# Patient Record
Sex: MALE | Race: WHITE | HISPANIC OR LATINO | Employment: FULL TIME | URBAN - METROPOLITAN AREA
[De-identification: names, ages, dates, MRNs, and addresses within clinical notes are randomized per-mention and may not be internally consistent; named-entity substitution may affect disease eponyms.]

---

## 2017-08-14 ENCOUNTER — TRANSFERRED RECORDS (OUTPATIENT)
Dept: HEALTH INFORMATION MANAGEMENT | Facility: CLINIC | Age: 22
End: 2017-08-14

## 2017-08-14 ENCOUNTER — OFFICE VISIT (OUTPATIENT)
Dept: FAMILY MEDICINE | Age: 22
End: 2017-08-14

## 2017-08-14 VITALS
WEIGHT: 170.86 LBS | SYSTOLIC BLOOD PRESSURE: 122 MMHG | BODY MASS INDEX: 24.46 KG/M2 | RESPIRATION RATE: 14 BRPM | HEART RATE: 58 BPM | HEIGHT: 70 IN | DIASTOLIC BLOOD PRESSURE: 82 MMHG

## 2017-08-14 DIAGNOSIS — F98.8 ADD (ATTENTION DEFICIT DISORDER): Primary | ICD-10-CM

## 2017-08-14 DIAGNOSIS — J45.990 EXERCISE INDUCED BRONCHOSPASM: ICD-10-CM

## 2017-08-14 PROCEDURE — 99214 OFFICE O/P EST MOD 30 MIN: CPT | Performed by: PHYSICIAN ASSISTANT

## 2017-08-14 RX ORDER — METHYLPHENIDATE HYDROCHLORIDE 20 MG/1
TABLET ORAL
Qty: 30 TABLET | Refills: 0 | Status: SHIPPED | OUTPATIENT
Start: 2017-08-14 | End: 2017-09-15 | Stop reason: SDUPTHER

## 2017-08-14 RX ORDER — METHYLPHENIDATE HYDROCHLORIDE 50 MG/1
50 CAPSULE, EXTENDED RELEASE ORAL EVERY MORNING
Qty: 30 CAPSULE | Refills: 0 | Status: SHIPPED | OUTPATIENT
Start: 2017-08-14 | End: 2017-09-15 | Stop reason: SDUPTHER

## 2017-08-14 RX ORDER — ALBUTEROL SULFATE 90 UG/1
AEROSOL, METERED RESPIRATORY (INHALATION)
Qty: 1 INHALER | Refills: 5 | Status: SHIPPED | OUTPATIENT
Start: 2017-08-14

## 2017-09-15 ENCOUNTER — TELEPHONE (OUTPATIENT)
Dept: FAMILY MEDICINE | Age: 22
End: 2017-09-15

## 2017-09-15 DIAGNOSIS — F98.8 ADD (ATTENTION DEFICIT DISORDER): ICD-10-CM

## 2017-09-15 RX ORDER — METHYLPHENIDATE HYDROCHLORIDE 20 MG/1
TABLET ORAL
Qty: 30 TABLET | Refills: 0 | Status: SHIPPED | OUTPATIENT
Start: 2017-09-15

## 2017-09-15 RX ORDER — METHYLPHENIDATE HYDROCHLORIDE 50 MG/1
50 CAPSULE, EXTENDED RELEASE ORAL EVERY MORNING
Qty: 30 CAPSULE | Refills: 0 | Status: SHIPPED | OUTPATIENT
Start: 2017-09-15

## 2017-10-02 ENCOUNTER — TELEPHONE (OUTPATIENT)
Dept: FAMILY MEDICINE | Facility: CLINIC | Age: 22
End: 2017-10-02

## 2017-10-02 ENCOUNTER — OFFICE VISIT (OUTPATIENT)
Dept: FAMILY MEDICINE | Facility: CLINIC | Age: 22
End: 2017-10-02
Payer: COMMERCIAL

## 2017-10-02 VITALS
HEART RATE: 60 BPM | DIASTOLIC BLOOD PRESSURE: 82 MMHG | TEMPERATURE: 97.5 F | WEIGHT: 167.13 LBS | BODY MASS INDEX: 23.4 KG/M2 | HEIGHT: 71 IN | OXYGEN SATURATION: 99 % | SYSTOLIC BLOOD PRESSURE: 128 MMHG

## 2017-10-02 DIAGNOSIS — F90.0 ATTENTION DEFICIT HYPERACTIVITY DISORDER (ADHD), PREDOMINANTLY INATTENTIVE TYPE: Primary | ICD-10-CM

## 2017-10-02 DIAGNOSIS — J45.990 EXERCISE-INDUCED ASTHMA: ICD-10-CM

## 2017-10-02 PROCEDURE — 99203 OFFICE O/P NEW LOW 30 MIN: CPT | Performed by: FAMILY MEDICINE

## 2017-10-02 RX ORDER — METHYLPHENIDATE HYDROCHLORIDE 50 MG/1
50 CAPSULE, EXTENDED RELEASE ORAL
COMMUNITY
Start: 2015-03-15 | End: 2017-10-25

## 2017-10-02 RX ORDER — ALBUTEROL SULFATE 90 UG/1
2 AEROSOL, METERED RESPIRATORY (INHALATION)
COMMUNITY
Start: 2015-03-15 | End: 2017-10-25

## 2017-10-02 RX ORDER — METHYLPHENIDATE HYDROCHLORIDE 20 MG/1
20 TABLET ORAL AT BEDTIME
COMMUNITY
End: 2017-10-25

## 2017-10-02 NOTE — MR AVS SNAPSHOT
"              After Visit Summary   10/2/2017    Patrick Gutierrez    MRN: [id number]           Patient Information     Date Of Birth          1995        Visit Information        Provider Department      10/2/2017 7:20 AM Katt Meredith DO [facility]        Today's Diagnoses     Attention deficit hyperactivity disorder (ADHD), predominantly inattentive type    -  1    Exercise-induced asthma           Follow-ups after your visit        Who to contact     If you have questions or need follow up information about today's clinic visit or your schedule please contact [facility] directly at [phone].  Normal or non-critical lab and imaging results will be communicated to you by [employer], letter or phone within 4 business days after the clinic has received the results. If you do not hear from us within 7 days, please contact the clinic through [employer] or phone. If you have a critical or abnormal lab result, we will notify you by phone as soon as possible.  Submit refill requests through [employer] or call your pharmacy and they will forward the refill request to us. Please allow 3 business days for your refill to be completed.          Additional Information About Your Visit        MyChart Information     [employer] lets you send messages to your doctor, view your test results, renew your prescriptions, schedule appointments and more. To sign up, go to [email or web address] . Click on \"Log in\" on the left side of the screen, which will take you to the Welcome page. Then click on \"Sign up Now\" on the right side of the page.     You will be asked to enter the access code listed below, as well as some personal information. Please follow the directions to create your username and password.     Your access code is: [email or web address]  Expires: 2017  9:48 AM     Your access code will  in 90 days. If you need help or a new code, please call your [facility] or " "[phone].        Care EveryWhere ID     This is your Care EveryWhere ID. This could be used by other organizations to access your [facility] medical records  [id number]        Your Vitals Were     Pulse Temperature Height Pulse Oximetry BMI (Body Mass Index)       60 97.5  F (36.4  C) (Oral) 5' 11\" (1.803 m) 99% 23.31 kg/m2        Blood Pressure from Last 3 Encounters:   10/02/17 128/82    Weight from Last 3 Encounters:   10/02/17 167 lb 2 oz (75.8 kg)              Today, you had the following     No orders found for display       Primary Care Provider    None Specified       No primary provider on file.        Equal Access to Services     HOPE BOTELLO : Lowell Solitario, denny leach, rafael foster, rosa maria kaplan . So [facility] [phone].    ATENCIÓN: Si habla español, tiene a lopez disposición servicios gratuitos de asistencia lingüística. Llame al [phone].    We comply with applicable federal civil rights laws and [place] laws. We do not discriminate on the basis of race, color, national origin, age, disability, sex, sexual orientation, or gender identity.            Thank you!     Thank you for choosing [facility]  for your care. Our goal is always to provide you with excellent care. Hearing back from our patients is one way we can continue to improve our services. Please take a few minutes to complete the written survey that you may receive in the mail after your visit with us. Thank you!             Your Updated Medication List - Protect others around you: Learn how to safely use, store and throw away your medicines at www.disposemymeds.org.          This list is accurate as of: 10/2/17  9:48 AM.  Always use your most recent med list.                   Brand Name Dispense Instructions for use Diagnosis    albuterol 108 (90 BASE) MCG/ACT Inhaler    PROAIR HFA/PROVENTIL HFA/VENTOLIN HFA     Inhale 2 puffs into the lungs        * " methylphenidate 20 MG tablet    RITALIN     Take 20 mg by mouth At Bedtime        * methylphenidate 50 MG CR capsule    METADATE CD     Take 50 mg by mouth        * Notice:  This list has 2 medication(s) that are the same as other medications prescribed for you. Read the directions carefully, and ask your doctor or other care provider to review them with you.

## 2017-10-02 NOTE — TELEPHONE ENCOUNTER
Called patient and left a VM message for patient to come back to clinic and sign the Release of Information.    Placed Release of Information at the  and logged into book (in envelope).    Please have patient sign the Release of Information.        Patient was seen today and he took with him a Release of Information for his prior PCP.    Patient accidentally took the Release of Info with him but brought it back to the clinic.    The Release of Information does not have patients signature.

## 2017-10-02 NOTE — NURSING NOTE
"Chief Complaint   Patient presents with     Recheck Medication       Initial /84 (BP Location: Left arm, Patient Position: Sitting, Cuff Size: Adult Regular)  Pulse 60  Temp 97.5  F (36.4  C) (Oral)  Ht 5' 11\" (1.803 m)  Wt 167 lb 2 oz (75.8 kg)  SpO2 99%  BMI 23.31 kg/m2 Estimated body mass index is 23.31 kg/(m^2) as calculated from the following:    Height as of this encounter: 5' 11\" (1.803 m).    Weight as of this encounter: 167 lb 2 oz (75.8 kg).  Medication Reconciliation: complete     Jesenia MORENO, Certified Medical Assistant (AAMA)October 2, 2017 7:26 AM      "

## 2017-10-02 NOTE — PROGRESS NOTES
"  SUBJECTIVE:   Patrick Gutierrez is a 22 year old male who presents to clinic today for the following health issues:      Asthma Follow-Up    Was ACT completed today?  Yes  No flowsheet data found.    Recent asthma triggers that patient is dealing with: exercise or sports      He just uses -track, and had done well on this      Establishing care for ADD.  He was going to [facility] in [place].        Amount of exercise or physical activity: 6-7 days/week for an average of greater than 60 minutes    Problems taking medications regularly: No    Medication side effects: none  Diet: regular (no restrictions)    Never see a psychologist, diagnosed in high school  adderall in the past  metadate cd and has short acting ritalin 20mg that he takes at night, it does not affect his sleep      Problem list and histories reviewed & adjusted, as indicated.  Additional history: as documented    Patient Active Problem List   Diagnosis     Exercise-induced asthma     History reviewed. No pertinent surgical history.    Social History   Substance Use Topics     Smoking status: Never Smoker     Smokeless tobacco: Never Used     Alcohol use No     Family History   Problem Relation Age of Onset     Unknown/Adopted Mother      Unknown/Adopted Father      Unknown/Adopted Maternal Grandmother      Unknown/Adopted Maternal Grandfather      Unknown/Adopted Paternal Grandmother      Unknown/Adopted Paternal Grandfather              Reviewed and updated as needed this visit by clinical staff       Reviewed and updated as needed this visit by Provider         ROS:  Constitutional, HEENT, cardiovascular, pulmonary, GI, , musculoskeletal, neuro, skin, endocrine and psych systems are negative, except as otherwise noted.      OBJECTIVE:   /82 (BP Location: Left arm, Patient Position: Sitting, Cuff Size: Adult Regular)  Pulse 60  Temp 97.5  F (36.4  C) (Oral)  Ht 5' 11\" (1.803 m)  Wt 167 lb 2 oz (75.8 kg)  SpO2 99%  BMI 23.31 " kg/m2  Body mass index is 23.31 kg/(m^2).  GENERAL: healthy, alert and no distress  NECK: no adenopathy, no asymmetry, masses, or scars and thyroid normal to palpation  RESP: lungs clear to auscultation - no rales, rhonchi or wheezes  CV: regular rate and rhythm, normal S1 S2, no S3 or S4, no murmur, click or rub, no peripheral edema and peripheral pulses strong  ABDOMEN: soft, nontender, no hepatosplenomegaly, no masses and bowel sounds normal  MS: no gross musculoskeletal defects noted, no edema    Diagnostic Test Results:  none     ASSESSMENT/PLAN:       ICD-10-CM    1. Attention deficit hyperactivity disorder (ADHD), predominantly inattentive type F90.0    2. Exercise-induced asthma J45.990      Per patient exercise induced asthma-stable on proair  ADD-he is stable on current meds, he has refills for one month, he will call to get records, once reviewed can give him 3 months supply then needs to follow up with provider here, he reports of getting his refills last week      See Patient Instructions    Katt Meredith DO  [facility]

## 2017-10-03 ASSESSMENT — ASTHMA QUESTIONNAIRES: ACT_TOTALSCORE: 25

## 2017-10-03 NOTE — TELEPHONE ENCOUNTER
Faxed Release of Information to [facility] ([facility]) at [phone].    Dana Victor

## 2017-10-25 DIAGNOSIS — J45.990 EXERCISE-INDUCED ASTHMA: ICD-10-CM

## 2017-10-25 DIAGNOSIS — F90.0 ATTENTION DEFICIT HYPERACTIVITY DISORDER (ADHD), PREDOMINANTLY INATTENTIVE TYPE: Primary | ICD-10-CM

## 2017-10-25 NOTE — TELEPHONE ENCOUNTER
We have not filled for this patient so will need new RX's    Thanks

## 2017-10-27 RX ORDER — METHYLPHENIDATE HYDROCHLORIDE 20 MG/1
20 TABLET ORAL AT BEDTIME
Qty: 30 TABLET | Refills: 0 | Status: SHIPPED | OUTPATIENT
Start: 2017-10-27 | End: 2017-11-27

## 2017-10-27 RX ORDER — ALBUTEROL SULFATE 90 UG/1
2 AEROSOL, METERED RESPIRATORY (INHALATION) EVERY 6 HOURS PRN
Qty: 1 INHALER | Refills: 0 | Status: SHIPPED | OUTPATIENT
Start: 2017-10-27 | End: 2017-11-22

## 2017-10-27 RX ORDER — METHYLPHENIDATE HYDROCHLORIDE 50 MG/1
50 CAPSULE, EXTENDED RELEASE ORAL EVERY MORNING
Qty: 30 CAPSULE | Refills: 0 | Status: SHIPPED | OUTPATIENT
Start: 2017-10-27 | End: 2017-11-27

## 2017-10-27 NOTE — TELEPHONE ENCOUNTER
No records here, will only give one month for now, he will need to call his clinic and get records in and come in for a visit for further refills  Thanks  Katt Meredith D.O.

## 2017-10-30 NOTE — TELEPHONE ENCOUNTER
Patient came to  script from , patient stated he fills his scripts at [facility] and was unsure why his was walked over to the [facility], patient will be back at 830 to  script from pharmacy.    .Joana Nettles  Patient Representative

## 2017-11-22 DIAGNOSIS — J45.990 EXERCISE-INDUCED ASTHMA: ICD-10-CM

## 2017-11-24 RX ORDER — ALBUTEROL SULFATE 90 UG/1
AEROSOL, METERED RESPIRATORY (INHALATION)
Qty: 18 G | Refills: 0 | Status: SHIPPED | OUTPATIENT
Start: 2017-11-24 | End: 2018-01-02

## 2017-11-24 NOTE — TELEPHONE ENCOUNTER
Requested Prescriptions   Pending Prescriptions Disp Refills     VENTOLIN  (90 BASE) MCG/ACT Inhaler [Pharmacy Med Name: VENTOLIN HFA 108MCG/ACT AERS] 18 g 0     Sig: INHALE 2 PUFFS INTO THE LUNGS EVERY 6 HOURS AS NEEDED FOR SHORTNESS OF BREATH/DYSPNEA OR WHEEZING    Asthma Maintenance Inhalers - Anticholinergics Passed    11/22/2017 12:47 PM       Passed - Patient is age 12 years or older       Passed - Asthma control test score is 20 or greater in last 6 months    Please review ACT score.          Passed - Recent (6 mo) or future visit with authorizing provider's specialty    Patient had office visit in the last 6 months or has a visit in the next 30 days with authorizing provider.  See chart review.

## 2017-11-24 NOTE — TELEPHONE ENCOUNTER
Prescription approved per [facility] Refill Protocol.    Marcelina Prieto RN  [facility]

## 2017-11-27 DIAGNOSIS — F90.0 ATTENTION DEFICIT HYPERACTIVITY DISORDER (ADHD), PREDOMINANTLY INATTENTIVE TYPE: ICD-10-CM

## 2017-12-04 NOTE — TELEPHONE ENCOUNTER
Reason for Call:  Medication or medication refill:  refill    Do you use a [facility]?  Yes  Name of the pharmacy and phone number for the current request: [facility]    Name of the medication requested:  ADHD medications    Other request: Patient is calling to check the status of the medication.  He states he is out of the medication.  Patient informed that Dr Meredith is out of clinic until 12/11/17.  Patient was upset that he was not told this before and that a different provider would not fill it.     Can we leave a detailed message on this number? YES    Phone number patient can be reached at: Home number on file [phone] (home)    Best Time: Any     Call taken on 12/4/2017 at 8:51 AM by Giselle Brandon

## 2017-12-05 NOTE — TELEPHONE ENCOUNTER
Based on Dr. Meredith's last note, she wanted to review his previous records, have we gotten those?  Please get   Perhaps we can give short term refill until records are obtained and Dr. Meredith has returned, team to huddle with a provider once above is done.  Essie Multani MD

## 2017-12-06 RX ORDER — METHYLPHENIDATE HYDROCHLORIDE 50 MG/1
50 CAPSULE, EXTENDED RELEASE ORAL EVERY MORNING
Qty: 30 CAPSULE | Refills: 0 | Status: SHIPPED | OUTPATIENT
Start: 2017-12-06 | End: 2018-01-03

## 2017-12-06 RX ORDER — METHYLPHENIDATE HYDROCHLORIDE 20 MG/1
20 TABLET ORAL AT BEDTIME
Qty: 30 TABLET | Refills: 0 | Status: SHIPPED | OUTPATIENT
Start: 2017-12-06 | End: 2018-01-03

## 2017-12-06 NOTE — TELEPHONE ENCOUNTER
Anyone can refill it, I won't be at the office for another two hours due to meetings.    Ralf Doe MD

## 2017-12-06 NOTE — TELEPHONE ENCOUNTER
Encounter dated 12/06/17 at 8:17 deleted because it contains information regarding other patients. This was done in error.   Jagdish Carlos RN

## 2017-12-06 NOTE — TELEPHONE ENCOUNTER
Refills printed.  Please call patient and then advise follow up with Dr. Meredith for the next refills.    Ralf Doe MD

## 2017-12-06 NOTE — TELEPHONE ENCOUNTER
Dr. Doe,   Patient is requesting a refill of his ADHD medications. He is a patient of Dr. Meredith's and she is out of the office until next week.  Below is the  report.  Would you be willing to write a refill?  Patient uses our Pharmacy next door.    Thank you,  Jagdish Carlos RN        [place] Prescription Monitoring Program        Query Request Status  Query Number  Job Sequence ID Request Date Query Status/  Job Status Report Description or Denial Reason Output   [id number]   [id number]  17 Approved /   Queued  Recipient Report  Dispensed From 2016 to 2017  1 out of 2 Recipients Selected  Patrick Gutierrez - : 1995 - [address]   PDF   [id number]  [id number] 17 Approved /   Done  Recipient Report  Dispensed From 2016 to 2017  1 out of 1 Recipients Selected  TOI FRANCOIS - : 1973 -  [address]   PDF   [id number]  [id number] 17 Approved /   Done  Recipient Report  Dispensed From 2016 to 2017  1 out of 1 Recipients Selected  TOI FRANCOIS - : [address]   PDF   Search History Status  Job Sequence ID Date Requested Status Report Output        Copyright   2017 [employer]  If you need further assistance, please contact the [facility] Help Desk [place] Prescription Monitoring Program   [address], [address]   [place] [address]  HelpDesk:[phone]/Phone:[phone]  Email: [email or web address].

## 2018-01-02 DIAGNOSIS — J45.990 EXERCISE-INDUCED ASTHMA: ICD-10-CM

## 2018-01-03 RX ORDER — ALBUTEROL SULFATE 90 UG/1
AEROSOL, METERED RESPIRATORY (INHALATION)
Qty: 18 G | Refills: 1 | Status: SHIPPED | OUTPATIENT
Start: 2018-01-03 | End: 2018-05-22

## 2018-01-03 NOTE — TELEPHONE ENCOUNTER
Prescription approved per [facility] Refill Protocol.  Cassandra Seth,Clinic Rn  [place]

## 2018-01-03 NOTE — TELEPHONE ENCOUNTER
Requested Prescriptions   Pending Prescriptions Disp Refills     VENTOLIN  (90 BASE) MCG/ACT Inhaler [Pharmacy Med Name: VENTOLIN HFA 108MCG/ACT AERS]  Last Written Prescription Date:  11/24/2017  Last Fill Quantity: 18 g,  # refills: 0   Last Office Visit with [facility] or [facility] prescribing provider:  10/2/2017   Future Office Visit:      18 g 0     Sig: INHALE 2 PUFFS INTO THE LUNGS EVERY 6 HOURS AS NEEDED FOR SHORTNESS OF BREATH/DYSPNEA OR WHEEZING    Asthma Maintenance Inhalers - Anticholinergics Passed    1/2/2018  1:17 PM       Passed - Patient is age 12 years or older       Passed - Asthma control test score is 20 or greater in last 6 months    Please review ACT score.          Passed - Recent (6 mo) or future visit with authorizing provider's specialty    Patient had office visit in the last 6 months or has a visit in the next 30 days with authorizing provider.  See chart review.

## 2018-01-05 ENCOUNTER — TELEPHONE (OUTPATIENT)
Dept: FAMILY MEDICINE | Facility: CLINIC | Age: 23
End: 2018-01-05

## 2018-01-05 NOTE — TELEPHONE ENCOUNTER
Please make sure patient has his records from his old clinic , as I can not keep refilling adderall without his records  I have discussed this with him when he established care 10/17  Other wise he should make an appoint with psychiatry/psychologist for diagnosis.  Katt Meredith D.O.

## 2018-01-08 NOTE — TELEPHONE ENCOUNTER
Patient called and would like to know can medication be refilled before appointment because he is out, please call to discuss [phone]

## 2018-01-09 NOTE — TELEPHONE ENCOUNTER
Informed patient that we have not received his records yet & encouraged him to call his previous provider to fax them. We need those records before we can prescribe. Patient verbalized understanding.    Sindhu Montano RN

## 2018-01-11 ENCOUNTER — DOCUMENTATION ONLY (OUTPATIENT)
Dept: FAMILY MEDICINE | Facility: CLINIC | Age: 23
End: 2018-01-11

## 2018-01-12 NOTE — PROGRESS NOTES
Records received from [facility].  Routed to Katt Meredith DO to review and return to be scanned into chart.      .Joana Nettles  Patient Representative

## 2018-01-12 NOTE — TELEPHONE ENCOUNTER
We received patients prior medical records and Dr Meredith reviewed.    Dr Meredith wrote one month supply of ADHD medication.    Called patient to let him know and he asked that I bring the scripts next door to our pharmacy.    Walked scripts next door to our pharmacy.    Dana Victor

## 2018-01-22 ENCOUNTER — OFFICE VISIT (OUTPATIENT)
Dept: FAMILY MEDICINE | Facility: CLINIC | Age: 23
End: 2018-01-22
Payer: COMMERCIAL

## 2018-01-22 VITALS
WEIGHT: 161 LBS | HEIGHT: 71 IN | HEART RATE: 70 BPM | SYSTOLIC BLOOD PRESSURE: 122 MMHG | DIASTOLIC BLOOD PRESSURE: 72 MMHG | TEMPERATURE: 98.2 F | BODY MASS INDEX: 22.54 KG/M2

## 2018-01-22 DIAGNOSIS — F90.0 ATTENTION DEFICIT HYPERACTIVITY DISORDER (ADHD), PREDOMINANTLY INATTENTIVE TYPE: ICD-10-CM

## 2018-01-22 DIAGNOSIS — J45.990 EXERCISE-INDUCED ASTHMA: Primary | ICD-10-CM

## 2018-01-22 PROCEDURE — 99214 OFFICE O/P EST MOD 30 MIN: CPT | Performed by: FAMILY MEDICINE

## 2018-01-22 RX ORDER — METHYLPHENIDATE HYDROCHLORIDE 20 MG/1
TABLET ORAL
Qty: 30 TABLET | Refills: 0 | Status: SHIPPED | OUTPATIENT
Start: 2018-04-12 | End: 2018-08-15

## 2018-01-22 RX ORDER — METHYLPHENIDATE HYDROCHLORIDE 50 MG/1
50 CAPSULE, EXTENDED RELEASE ORAL DAILY
Qty: 30 CAPSULE | Refills: 0 | Status: SHIPPED | OUTPATIENT
Start: 2018-02-12 | End: 2018-03-14

## 2018-01-22 RX ORDER — METHYLPHENIDATE HYDROCHLORIDE 20 MG/1
TABLET ORAL
Qty: 30 TABLET | Refills: 0 | Status: SHIPPED | OUTPATIENT
Start: 2018-02-12 | End: 2018-05-15

## 2018-01-22 RX ORDER — METHYLPHENIDATE HYDROCHLORIDE 50 MG/1
50 CAPSULE, EXTENDED RELEASE ORAL EVERY MORNING
Qty: 30 CAPSULE | Refills: 0 | Status: CANCELLED | OUTPATIENT
Start: 2018-01-22

## 2018-01-22 RX ORDER — METHYLPHENIDATE HYDROCHLORIDE 50 MG/1
50 CAPSULE, EXTENDED RELEASE ORAL DAILY
Qty: 30 CAPSULE | Refills: 0 | Status: SHIPPED | OUTPATIENT
Start: 2018-03-12 | End: 2018-04-11

## 2018-01-22 RX ORDER — METHYLPHENIDATE HYDROCHLORIDE 50 MG/1
50 CAPSULE, EXTENDED RELEASE ORAL DAILY
Qty: 30 CAPSULE | Refills: 0 | Status: SHIPPED | OUTPATIENT
Start: 2018-04-12 | End: 2018-05-12

## 2018-01-22 RX ORDER — METHYLPHENIDATE HYDROCHLORIDE 20 MG/1
TABLET ORAL
Qty: 30 TABLET | Refills: 0 | Status: SHIPPED | OUTPATIENT
Start: 2018-01-22 | End: 2018-05-22

## 2018-01-22 RX ORDER — FLUTICASONE PROPIONATE 220 UG/1
2 AEROSOL, METERED RESPIRATORY (INHALATION) 2 TIMES DAILY
Qty: 3 INHALER | Refills: 0 | Status: SHIPPED | OUTPATIENT
Start: 2018-01-22 | End: 2018-05-22

## 2018-01-22 RX ORDER — METHYLPHENIDATE HYDROCHLORIDE 20 MG/1
TABLET ORAL
Qty: 30 TABLET | Refills: 0 | Status: SHIPPED | OUTPATIENT
Start: 2018-03-12 | End: 2018-08-15

## 2018-01-22 NOTE — MR AVS SNAPSHOT
After Visit Summary   1/22/2018    Patrick Gutierrez    MRN: [id number]           Patient Information     Date Of Birth          1995        Visit Information        Provider Department      1/22/2018 10:00 AM Katt Meredith DO [facility]        Today's Diagnoses     Exercise-induced asthma    -  1    Attention deficit hyperactivity disorder (ADHD), predominantly inattentive type          Care Instructions    Use flovent as advised and albuterol  If persisting symptoms then follow up sooner  Otherwise we will see you in 6 months as planned  Sign contracted as discussed today  Katt Meredith D.O.    [facility]   Discharged by : Rosa Maria Butt MA    Paper scripts provided to patient : yes     If you have any questions regarding your visit please contact your care team:     Team Gold                Clinic Hours Telephone Number     Dr. Jesenia Del Castillo 7am-7pm  Monday - Thursday   7am-5pm  Fridays  [phone]   (Appointment scheduling available 24/7)     RN Line  [phone] option 2     Urgent Care - [place] and [facility] - 11am-9pm Monday-Friday Saturday-Sunday- 9am-5pm     [place] -   5pm-9pm Monday-Friday Saturday-Sunday- 9am-5pm    [phone] - [place]    [phone] - [place]       For a Price Quote for your services, please call our Consumer Price Line at [phone].     What options do I have for visits at the clinic other than the traditional office visit?     To expand how we care for you, many of our providers are utilizing electronic visits (e-visits) and telephone visits, when medically appropriate, for interactions with their patients rather than a visit in the clinic. We also offer nurse visits for many medical concerns. Just like any other service, we will bill your insurance company for this type of visit based on  time spent on the phone with your provider. Not all insurance companies cover these visits. Please check with your medical insurance if this type of visit is covered. You will be responsible for any charges that are not paid by your insurance.   E-visits via [employer]: generally incur a $35.00 fee.     Telephone visits:   Time spent on the phone: *charged based on time that is spent on the phone in increments of 10 minutes. Estimated cost:   5-10 mins $30.00   11-20 mins. $59.00   21-30 mins. $85.00     Use [employer] (secure email communication and access to your chart) to send your primary care provider a message or make an appointment. Ask someone on your Team how to sign up for [employer].     As always, Thank you for trusting us with your health care needs!      [facility]:    Scheduling Appointments  [place]  Call: [phone]    [facility]  Call: [phone]    [facility]  Call: [phone]    For Gastroenterology referrals   [facility]   Clinics and Surgery Center, 4th Floor   [address] [place] [id number]   Appointments: [phone]    WHERE TO GO FOR CARE?  Clinic    Make an appointment if you:       Are sick (cold, cough, flu, sore throat, earache or in pain).       Have a small injury (sprain, small cut, burn or broken bone).       Need a physical exam, Pap smear, vaccine or prescription refill.       Have questions about your health or medicines.    To reach us:      Call [phone] ([phone]). Open 24 hours every day. (For counseling services, call [phone].)    Log into [employer] at [email or web address]. (Visit [email or web address] to create an account.) Hospital emergency room    An emergency is a serious or life- threatening problem that must be treated right away.    Call 233 or get to the hospital if you have:      Very bad or sudden:            - Chest pain or  pressure         - Bleeding         - Head or belly pain         - Dizziness or trouble seeing, walking or                          Speaking      Problems breathing      Blood in your vomit or you are coughing up blood      A major injury (knocked out, loss of a finger or limb, rape, broken bone protruding from skin)    A mental health crisis. (Or call the Mental Health Crisis line at 1-539.212.8128 or Suicide Prevention Hotline at 1-473.661.8676.)    Open 24 hours every day. You don't need an appointment.     Urgent care    Visit urgent care for sickness or small injuries when the clinic is closed. You don't need an appointment. To check hours or find an urgent care near you, visit [email or web address]. Online care    Get online care from [facility] for more than 70 common problems, like colds, allergies and infections. Open 24 hours every day at:   [email or web address]   Need help deciding?    For advice about where to be seen, you may call your clinic and ask to speak with a nurse. We're here for you 24 hours every day.         If you are deaf or hard of hearing, please let us know. We provide many free services including sign language interpreters, oral interpreters, TTYs, telephone amplifiers, note takers and written materials.                   Follow-ups after your visit        Who to contact     If you have questions or need follow up information about today's clinic visit or your schedule please contact [facility] directly at [phone].  Normal or non-critical lab and imaging results will be communicated to you by MyChart, letter or phone within 4 business days after the clinic has received the results. If you do not hear from us within 7 days, please contact the clinic through [employer] or phone. If you have a critical or abnormal lab result, we will notify you by phone as soon as possible.  Submit refill requests through [employer] or call your pharmacy and they will forward the refill request to us.  "Please allow 3 business days for your refill to be completed.          Additional Information About Your Visit        MyChart Information     Allmyappshart lets you send messages to your doctor, view your test results, renew your prescriptions, schedule appointments and more. To sign up, go to [email or web address] . Click on \"Log in\" on the left side of the screen, which will take you to the Welcome page. Then click on \"Sign up Now\" on the right side of the page.     You will be asked to enter the access code listed below, as well as some personal information. Please follow the directions to create your username and password.     Your access code is: [email or web address]  Expires: 2018 10:27 AM     Your access code will  in 90 days. If you need help or a new code, please call your [facility] clinic or [phone].        Care EveryWhere ID     This is your Care EveryWhere ID. This could be used by other organizations to access your [facility] medical records  [id number]        Your Vitals Were     Pulse Temperature Height BMI (Body Mass Index)          70 98.2  F (36.8  C) (Oral) 5' 11\" (1.803 m) 22.45 kg/m2         Blood Pressure from Last 3 Encounters:   18 122/72   10/02/17 128/82    Weight from Last 3 Encounters:   18 161 lb (73 kg)   10/02/17 167 lb 2 oz (75.8 kg)              Today, you had the following     No orders found for display         Today's Medication Changes          These changes are accurate as of: 18 10:27 AM.  If you have any questions, ask your nurse or doctor.               Start taking these medicines.        Dose/Directions    fluticasone 220 MCG/ACT Inhaler   Commonly known as:  FLOVENT HFA   Used for:  Exercise-induced asthma   Started by:  Katt Meredith DO        Dose:  2 puff   Inhale 2 puffs into the lungs 2 times daily   Quantity:  3 Inhaler   Refills:  0         These medicines have changed or have updated prescriptions.        Dose/Directions    * " methylphenidate 50 MG CR capsule   Commonly known as:  METADATE CD   This may have changed:    - Another medication with the same name was added. Make sure you understand how and when to take each.  - Another medication with the same name was changed. Make sure you understand how and when to take each.   Used for:  Attention deficit hyperactivity disorder (ADHD), predominantly inattentive type   Changed by:  Ralf Doe MD        Dose:  50 mg   Take 1 capsule (50 mg) by mouth every morning   Quantity:  30 capsule   Refills:  0       * methylphenidate 20 MG tablet   Commonly known as:  RITALIN   This may have changed:  You were already taking a medication with the same name, and this prescription was added. Make sure you understand how and when to take each.   Used for:  Attention deficit hyperactivity disorder (ADHD), predominantly inattentive type   Changed by:  Katt Meredith DO        One tab daily   Quantity:  30 tablet   Refills:  0       * methylphenidate 20 MG tablet   Commonly known as:  RITALIN   This may have changed:    - how much to take  - how to take this  - when to take this  - additional instructions   Used for:  Attention deficit hyperactivity disorder (ADHD), predominantly inattentive type   Changed by:  Katt Meredith DO        Start taking on:  2/12/2018   Use one tab daily   Quantity:  30 tablet   Refills:  0       * methylphenidate 50 MG CR capsule   Commonly known as:  METADATE CD   This may have changed:  You were already taking a medication with the same name, and this prescription was added. Make sure you understand how and when to take each.   Used for:  Attention deficit hyperactivity disorder (ADHD), predominantly inattentive type   Changed by:  Katt Meredith DO        Dose:  50 mg   Start taking on:  2/12/2018   Take 1 capsule (50 mg) by mouth daily   Quantity:  30 capsule   Refills:  0       * methylphenidate 50 MG CR capsule   Commonly  known as:  METADATE CD   This may have changed:  You were already taking a medication with the same name, and this prescription was added. Make sure you understand how and when to take each.   Used for:  Attention deficit hyperactivity disorder (ADHD), predominantly inattentive type   Changed by:  Katt Meredith DO        Dose:  50 mg   Start taking on:  3/12/2018   Take 1 capsule (50 mg) by mouth daily   Quantity:  30 capsule   Refills:  0       * methylphenidate 20 MG tablet   Commonly known as:  RITALIN   This may have changed:  You were already taking a medication with the same name, and this prescription was added. Make sure you understand how and when to take each.   Used for:  Attention deficit hyperactivity disorder (ADHD), predominantly inattentive type   Changed by:  Katt Meredith DO        Start taking on:  3/12/2018   Use one tab daily   Quantity:  30 tablet   Refills:  0       * methylphenidate 50 MG CR capsule   Commonly known as:  METADATE CD   This may have changed:  You were already taking a medication with the same name, and this prescription was added. Make sure you understand how and when to take each.   Used for:  Attention deficit hyperactivity disorder (ADHD), predominantly inattentive type   Changed by:  Katt Meredith DO        Dose:  50 mg   Start taking on:  4/12/2018   Take 1 capsule (50 mg) by mouth daily   Quantity:  30 capsule   Refills:  0       * methylphenidate 20 MG tablet   Commonly known as:  RITALIN   This may have changed:  You were already taking a medication with the same name, and this prescription was added. Make sure you understand how and when to take each.   Used for:  Attention deficit hyperactivity disorder (ADHD), predominantly inattentive type   Changed by:  Katt Meredith DO        Start taking on:  4/12/2018   One tab daily   Quantity:  30 tablet   Refills:  0       * Notice:  This list has 8 medication(s) that are the same  as other medications prescribed for you. Read the directions carefully, and ask your doctor or other care provider to review them with you.         Where to get your medicines      These medications were [place] [facility] [place] - [address] [place] [address].  [address] [place] [address]., [facility] [address]     Phone:  [phone]     fluticasone 220 MCG/ACT Inhaler         Some of these will need a paper prescription and others can be bought over the counter.  Ask your nurse if you have questions.     Bring a paper prescription for each of these medications     methylphenidate 20 MG tablet    methylphenidate 20 MG tablet    methylphenidate 20 MG tablet    methylphenidate 20 MG tablet    methylphenidate 50 MG CR capsule    methylphenidate 50 MG CR capsule    methylphenidate 50 MG CR capsule                Primary Care Provider Office Phone # Fax #    Katt Meredith [phone]       [address] [place] [address]        Equal Access to Services     HOPE BOTELLO : Hadii nilton raymundoo Sowayne, waaxda luqadaha, qaybta kaalmada adeegyada, rosa maria kaplan . So [facility] [phone].    ATENCIÓN: Si habla español, tiene a lopez disposición servicios gratuitos de asistencia lingüística. Llame al [phone].    We comply with applicable federal civil rights laws and [place] laws. We do not discriminate on the basis of race, color, national origin, age, disability, sex, sexual orientation, or gender identity.            Thank you!     Thank you for choosing [facility]  for your care. Our goal is always to provide you with excellent care. Hearing back from our patients is one way we can continue to improve our services. Please take a few minutes to complete the written survey that you may receive in the mail after your visit with us. Thank you!             Your Updated Medication List - Protect others around you: Learn how  to safely use, store and throw away your medicines at www.disposemymeds.org.          This list is accurate as of: 1/22/18 10:27 AM.  Always use your most recent med list.                   Brand Name Dispense Instructions for use Diagnosis    fluticasone 220 MCG/ACT Inhaler    FLOVENT HFA    3 Inhaler    Inhale 2 puffs into the lungs 2 times daily    Exercise-induced asthma       * methylphenidate 50 MG CR capsule    METADATE CD    30 capsule    Take 1 capsule (50 mg) by mouth every morning    Attention deficit hyperactivity disorder (ADHD), predominantly inattentive type       * methylphenidate 20 MG tablet    RITALIN    30 tablet    One tab daily    Attention deficit hyperactivity disorder (ADHD), predominantly inattentive type       * methylphenidate 20 MG tablet   Start taking on:  2/12/2018    RITALIN    30 tablet    Use one tab daily    Attention deficit hyperactivity disorder (ADHD), predominantly inattentive type       * methylphenidate 50 MG CR capsule   Start taking on:  2/12/2018    METADATE CD    30 capsule    Take 1 capsule (50 mg) by mouth daily    Attention deficit hyperactivity disorder (ADHD), predominantly inattentive type       * methylphenidate 50 MG CR capsule   Start taking on:  3/12/2018    METADATE CD    30 capsule    Take 1 capsule (50 mg) by mouth daily    Attention deficit hyperactivity disorder (ADHD), predominantly inattentive type       * methylphenidate 20 MG tablet   Start taking on:  3/12/2018    RITALIN    30 tablet    Use one tab daily    Attention deficit hyperactivity disorder (ADHD), predominantly inattentive type       * methylphenidate 50 MG CR capsule   Start taking on:  4/12/2018    METADATE CD    30 capsule    Take 1 capsule (50 mg) by mouth daily    Attention deficit hyperactivity disorder (ADHD), predominantly inattentive type       * methylphenidate 20 MG tablet   Start taking on:  4/12/2018    RITALIN    30 tablet    One tab daily    Attention deficit hyperactivity  disorder (ADHD), predominantly inattentive type       VENTOLIN  (90 BASE) MCG/ACT Inhaler   Generic drug:  albuterol     18 g    INHALE 2 PUFFS INTO THE LUNGS EVERY 6 HOURS AS NEEDED FOR SHORTNESS OF BREATH/DYSPNEA OR WHEEZING    Exercise-induced asthma       * Notice:  This list has 8 medication(s) that are the same as other medications prescribed for you. Read the directions carefully, and ask your doctor or other care provider to review them with you.

## 2018-01-22 NOTE — LETTER
Controlled Medication Agreement for Stimulant Medication    [facility]  -- Controlled Medication Agreement    1/22/2018   Patrick Gutierrez   1995   [id number]       I understand that my provider is prescribing controlled medications to assist me in managing my ADHD.  The risks, benefits, and side effects of these medications have been explained to me and I agree to the following conditions for this type of treatment.    Stimulant Medication Prescribed: Ritalin 20mg daily, Ritalin 50mg CD daily    1.  I will take my medications exactly as prescribed and will not change the medication dosage or schedule without my provider's approval.  Refills will not be given if I  runs out early.     2.  I will keep all regular appointments at this clinic.  If there are three or more missed appointments or appointments canceled less than 2 hours before the scheduled time, my medication may be discontinued.    3.  I understand that prescriptions may only be written for one month at a time, and a written prescription is required each month.  Prescriptions cannot be called in or faxed to the pharmacy.    4.  If the prescription is lost or stolen, replacement is at the discretion of my provider.  I understand that this may mean the prescription might not be replaced.    5.  If I am late for scheduled follow up, I understand that I must make an appointment and that another refill is at the discretion of my provider.  This may mean a prescription for only the amount required until the appointment, regardless of prescription co-pay.  For example, if an appointment is made in 1 week, a prescription might only be written for 7 pills.      I understand that if I violate any of the above conditions, my prescription medications and/or treatment may be terminated.  If the violation includes providing controlled substances to anyone other than to whom the medication is prescribed, a report may be made to my child's physician, pharmacy,  and other authorities, including the police.    I have read this contract and it has been explained to me.  I fully understand the consequences of violating this agreement.    _________________________________/______________/____________________________    Patient signature/Date/Witness

## 2018-01-22 NOTE — PROGRESS NOTES
"  SUBJECTIVE:   Patrick Gutierrez is a 22 year old male who presents to clinic today for the following health issues:    6 month follow-up, reports he does not need refills today    Medication Followup of metedate and ritalin    Taking Medication as prescribed: yes    Side Effects:  None    Medication Helping Symptoms:  yes     Doing well with current meds,  No side effects  No appitite changes, no mood changes, focusing , no chest pain, no sob, no dizziness, no weight changes    Track, running and jumping a lot more   Usually uses albuterol as needed and prior to exercise and he has recently been using albuterol a little but more    He has been on advair in the past but did not use it daily  No smoking history          Problem list and histories reviewed & adjusted, as indicated.  Additional history: as documented    Patient Active Problem List   Diagnosis     Exercise-induced asthma     Attention deficit hyperactivity disorder (ADHD), predominantly inattentive type     History reviewed. No pertinent surgical history.    Social History   Substance Use Topics     Smoking status: Never Smoker     Smokeless tobacco: Never Used     Alcohol use No     Family History   Problem Relation Age of Onset     Unknown/Adopted Mother      Unknown/Adopted Father      Unknown/Adopted Maternal Grandmother      Unknown/Adopted Maternal Grandfather      Unknown/Adopted Paternal Grandmother      Unknown/Adopted Paternal Grandfather              Reviewed and updated as needed this visit by clinical staff     Reviewed and updated as needed this visit by Provider         ROS:  Constitutional, HEENT, cardiovascular, pulmonary, GI, , musculoskeletal, neuro, skin, endocrine and psych systems are negative, except as otherwise noted.      OBJECTIVE:   /72  Pulse 70  Temp 98.2  F (36.8  C) (Oral)  Ht 5' 11\" (1.803 m)  Wt 161 lb (73 kg)  BMI 22.45 kg/m2  Body mass index is 22.45 kg/(m^2).  GENERAL: healthy, alert and no " distress  HENT: ear canals and TM's normal, nose and mouth without ulcers or lesions  NECK: no adenopathy, no asymmetry, masses, or scars and thyroid normal to palpation  RESP: lungs clear to auscultation - no rales, rhonchi or wheezes  CV: regular rate and rhythm, normal S1 S2, no S3 or S4, no murmur, click or rub, no peripheral edema and peripheral pulses strong  ABDOMEN: soft, nontender, no hepatosplenomegaly, no masses and bowel sounds normal  MS: no gross musculoskeletal defects noted, no edema  PSYCH: mentation appears normal, affect normal/bright    Diagnostic Test Results:  none     ASSESSMENT/PLAN:       ICD-10-CM    1. Exercise-induced asthma J45.990 fluticasone (FLOVENT HFA) 220 MCG/ACT Inhaler   2. Attention deficit hyperactivity disorder (ADHD), predominantly inattentive type F90.0 methylphenidate (RITALIN) 20 MG tablet     methylphenidate (METADATE CD) 50 MG CR capsule     methylphenidate (METADATE CD) 50 MG CR capsule     methylphenidate (METADATE CD) 50 MG CR capsule     methylphenidate (RITALIN) 20 MG tablet     methylphenidate (RITALIN) 20 MG tablet     methylphenidate (RITALIN) 20 MG tablet     Exercise induced asthma-using more albuterol but just with track season,Use flovent as advised and albuterol, follow up if not resolving, reassess otherwise in 6 months  If persisting symptoms then follow up sooner  ADD-stable on current meds  Signed contracted today      See Patient Instructions    Katt Meredith DO  [facility]

## 2018-01-22 NOTE — PATIENT INSTRUCTIONS
Use flovent as advised and albuterol  If persisting symptoms then follow up sooner  Otherwise we will see you in 6 months as planned  Sign contracted as discussed today  Katt Meredith D.O.    [facility]   Discharged by : Rosa Maria Butt MA    Paper scripts provided to patient : yes     If you have any questions regarding your visit please contact your care team:     Team Gold                Clinic Hours Telephone Number     Dr. Jesenia Del Castillo 7am-7pm  Monday - Thursday   7am-5pm  Fridays  [phone]   (Appointment scheduling available 24/7)     RN Line  [phone] option 2     Urgent Care - [place] - 11am-9pm Monday-Friday Saturday-Sunday- 9am-5pm     [place] -   5pm-9pm Monday-Friday Saturday-Sunday- 9am-5pm    [phone] - [place]    [phone] - [place]       For a Price Quote for your services, please call our Consumer Price Line at [phone].     What options do I have for visits at the clinic other than the traditional office visit?     To expand how we care for you, many of our providers are utilizing electronic visits (e-visits) and telephone visits, when medically appropriate, for interactions with their patients rather than a visit in the clinic. We also offer nurse visits for many medical concerns. Just like any other service, we will bill your insurance company for this type of visit based on time spent on the phone with your provider. Not all insurance companies cover these visits. Please check with your medical insurance if this type of visit is covered. You will be responsible for any charges that are not paid by your insurance.   E-visits via Antenna: generally incur a $35.00 fee.     Telephone visits:   Time spent on the phone: *charged based on time that is spent on the phone in increments of 10 minutes. Estimated cost:   5-10 mins $30.00    11-20 mins. $59.00   21-30 mins. $85.00     Use All My Data (secure email communication and access to your chart) to send your primary care provider a message or make an appointment. Ask someone on your Team how to sign up for All My Data.     As always, Thank you for trusting us with your health care needs!      [facility]:    Scheduling Appointments  [facility]  Call: [phone]    Carlos Parker, [facility]  Call: [phone]    [facility]  Call: [phone]    For Gastroenterology referrals   [facility]   Clinics and Surgery Center, 4th Floor   [address] [place] [address]   Appointments: [phone]    WHERE TO GO FOR CARE?  Clinic    Make an appointment if you:       Are sick (cold, cough, flu, sore throat, earache or in pain).       Have a small injury (sprain, small cut, burn or broken bone).       Need a physical exam, Pap smear, vaccine or prescription refill.       Have questions about your health or medicines.    To reach us:      Call [phone] ([phone]). Open 24 hours every day. (For counseling services, call [phone].)    Log into All My Data at [email or web address]. (Visit [email or web address] to create an account.) Hospital emergency room    An emergency is a serious or life- threatening problem that must be treated right away.    Call [phone] or get to the hospital if you have:      Very bad or sudden:            - Chest pain or pressure         - Bleeding         - Head or belly pain         - Dizziness or trouble seeing, walking or                          Speaking      Problems breathing      Blood in your vomit or you are coughing up blood      A major injury (knocked out, loss of a finger or limb, rape, broken bone protruding from skin)    A mental health crisis. (Or call the Mental Health Crisis line at 1-454.117.8317 or Suicide Prevention Hotline at 1-462.466.1654.)    Open 24  hours every day. You don't need an appointment.     Urgent care    Visit urgent care for sickness or small injuries when the clinic is closed. You don't need an appointment. To check hours or find an urgent care near you, visit [email or web address]. Online care    Get [facility] from [facility] for more than 70 common problems, like colds, allergies and infections. Open 24 hours every day at:   [email or web address]   Need help deciding?    For advice about where to be seen, you may call your clinic and ask to speak with a nurse. We're here for you 24 hours every day.         If you are deaf or hard of hearing, please let us know. We provide many free services including sign language interpreters, oral interpreters, TTYs, telephone amplifiers, note takers and written materials.

## 2018-01-22 NOTE — NURSING NOTE
"Chief Complaint   Patient presents with     A.D.H.D       Initial /72  Pulse 70  Temp 98.2  F (36.8  C) (Oral)  Ht 5' 11\" (1.803 m)  Wt 161 lb (73 kg)  BMI 22.45 kg/m2 Estimated body mass index is 22.45 kg/(m^2) as calculated from the following:    Height as of this encounter: 5' 11\" (1.803 m).    Weight as of this encounter: 161 lb (73 kg).  Medication Reconciliation: complete   Rosa Maria Butt MA      "

## 2018-04-12 ENCOUNTER — TRANSFERRED RECORDS (OUTPATIENT)
Dept: HEALTH INFORMATION MANAGEMENT | Facility: CLINIC | Age: 23
End: 2018-04-12

## 2018-05-15 DIAGNOSIS — F90.0 ATTENTION DEFICIT HYPERACTIVITY DISORDER (ADHD), PREDOMINANTLY INATTENTIVE TYPE: ICD-10-CM

## 2018-05-15 NOTE — TELEPHONE ENCOUNTER
methylphenidate (METADATE CD) 50 MG CR capsule      Last Written Prescription Date:  1/12/2018  Last Fill Quantity: 30 capsule,   # refills: 0  Last Office Visit: 1/22/2018    Future Office visit:    Next 5 appointments (look out 90 days)     May 22, 2018  1:00 PM CDT   SHORT with Katt Meredith DO   [address] [address]   [phone]                   Routing refill request to provider for review/approval because:  Drug not on the [facility], [facility] or [facility] refill protocol or controlled substance            methylphenidate (RITALIN) 20 MG tablet      Last Written Prescription Date:  4/12/2018  Last Fill Quantity: 30 tablet,   # refills: 0  Last Office Visit: 1/22/2018  hafsa/ SUSAN Meredith    Future Office visit:    Next 5 appointments (look out 90 days)     May 22, 2018  1:00 PM CDT   SHORT with Katt Meredith DO   [facility] ([address] [address]   [phone]                   Routing refill request to provider for review/approval because:  Drug not on the [facility], [facility] or [facility] refill protocol or controlled substance

## 2018-05-16 RX ORDER — METHYLPHENIDATE HYDROCHLORIDE 50 MG/1
50 CAPSULE, EXTENDED RELEASE ORAL EVERY MORNING
Qty: 30 CAPSULE | Refills: 0 | Status: SHIPPED | OUTPATIENT
Start: 2018-05-16 | End: 2018-05-22

## 2018-05-16 RX ORDER — METHYLPHENIDATE HYDROCHLORIDE 20 MG/1
TABLET ORAL
Qty: 30 TABLET | Refills: 0 | Status: SHIPPED | OUTPATIENT
Start: 2018-05-16 | End: 2018-08-15

## 2018-05-16 NOTE — TELEPHONE ENCOUNTER
Routing refill request for Ritalin to provider for review/approval because:  Drug not on the [facility] or [facility] refill protocol or controlled substance.    Ritalin 20mg last filled 4/12/18 for 30 days worth, and Metadate CD 50mg last filled on 1/12/18 for 30 days worth.  Patient last seen on 1/22/18 and has upcoming appointment on 5/22/18.     Siri Sparks RN

## 2018-05-17 NOTE — TELEPHONE ENCOUNTER
Patient is scheduled on 5/22/18 to see Dr Meredith.    Walked two scripts for Ritalin to our pharmacy next door.    Dana Victor

## 2018-05-22 ENCOUNTER — OFFICE VISIT (OUTPATIENT)
Dept: FAMILY MEDICINE | Facility: CLINIC | Age: 23
End: 2018-05-22
Payer: COMMERCIAL

## 2018-05-22 VITALS
HEIGHT: 71 IN | TEMPERATURE: 98.3 F | DIASTOLIC BLOOD PRESSURE: 70 MMHG | HEART RATE: 72 BPM | SYSTOLIC BLOOD PRESSURE: 118 MMHG | BODY MASS INDEX: 23.83 KG/M2 | OXYGEN SATURATION: 98 % | WEIGHT: 170.2 LBS

## 2018-05-22 DIAGNOSIS — Z13.29 SCREENING FOR THYROID DISORDER: ICD-10-CM

## 2018-05-22 DIAGNOSIS — Z11.3 SCREENING EXAMINATION FOR VENEREAL DISEASE: ICD-10-CM

## 2018-05-22 DIAGNOSIS — F90.0 ATTENTION DEFICIT HYPERACTIVITY DISORDER (ADHD), PREDOMINANTLY INATTENTIVE TYPE: Primary | ICD-10-CM

## 2018-05-22 DIAGNOSIS — J45.990 EXERCISE-INDUCED ASTHMA: ICD-10-CM

## 2018-05-22 PROCEDURE — 87389 HIV-1 AG W/HIV-1&-2 AB AG IA: CPT | Performed by: FAMILY MEDICINE

## 2018-05-22 PROCEDURE — 99214 OFFICE O/P EST MOD 30 MIN: CPT | Performed by: FAMILY MEDICINE

## 2018-05-22 PROCEDURE — 36415 COLL VENOUS BLD VENIPUNCTURE: CPT | Performed by: FAMILY MEDICINE

## 2018-05-22 PROCEDURE — 87491 CHLMYD TRACH DNA AMP PROBE: CPT | Performed by: FAMILY MEDICINE

## 2018-05-22 PROCEDURE — 86780 TREPONEMA PALLIDUM: CPT | Performed by: FAMILY MEDICINE

## 2018-05-22 PROCEDURE — 80307 DRUG TEST PRSMV CHEM ANLYZR: CPT | Mod: 90 | Performed by: FAMILY MEDICINE

## 2018-05-22 PROCEDURE — 99000 SPECIMEN HANDLING OFFICE-LAB: CPT | Performed by: FAMILY MEDICINE

## 2018-05-22 PROCEDURE — 87591 N.GONORRHOEAE DNA AMP PROB: CPT | Performed by: FAMILY MEDICINE

## 2018-05-22 PROCEDURE — 84443 ASSAY THYROID STIM HORMONE: CPT | Performed by: FAMILY MEDICINE

## 2018-05-22 RX ORDER — METHYLPHENIDATE HYDROCHLORIDE 20 MG/1
20 TABLET ORAL DAILY
Qty: 30 TABLET | Refills: 0 | Status: SHIPPED | OUTPATIENT
Start: 2018-09-14 | End: 2018-08-14

## 2018-05-22 RX ORDER — ALBUTEROL SULFATE 90 UG/1
AEROSOL, METERED RESPIRATORY (INHALATION)
Qty: 18 G | Refills: 1 | Status: SHIPPED | OUTPATIENT
Start: 2018-05-22 | End: 2018-08-24

## 2018-05-22 RX ORDER — METHYLPHENIDATE HYDROCHLORIDE 20 MG/1
TABLET ORAL
Qty: 30 TABLET | Refills: 0 | Status: SHIPPED | OUTPATIENT
Start: 2018-06-15 | End: 2018-08-15

## 2018-05-22 RX ORDER — METHYLPHENIDATE HYDROCHLORIDE 50 MG/1
50 CAPSULE, EXTENDED RELEASE ORAL EVERY MORNING
Qty: 30 CAPSULE | Refills: 0 | Status: SHIPPED | OUTPATIENT
Start: 2018-06-15 | End: 2018-08-15

## 2018-05-22 RX ORDER — METHYLPHENIDATE HYDROCHLORIDE 20 MG/1
20 TABLET ORAL DAILY
Qty: 30 TABLET | Refills: 0 | Status: SHIPPED | OUTPATIENT
Start: 2018-07-13 | End: 2018-08-24

## 2018-05-22 RX ORDER — METHYLPHENIDATE HYDROCHLORIDE 50 MG/1
50 CAPSULE, EXTENDED RELEASE ORAL DAILY
Qty: 30 CAPSULE | Refills: 0 | Status: SHIPPED | OUTPATIENT
Start: 2018-09-14 | End: 2018-08-14

## 2018-05-22 RX ORDER — METHYLPHENIDATE HYDROCHLORIDE 50 MG/1
50 CAPSULE, EXTENDED RELEASE ORAL DAILY
Qty: 30 CAPSULE | Refills: 0 | Status: SHIPPED | OUTPATIENT
Start: 2018-08-13 | End: 2018-08-24

## 2018-05-22 RX ORDER — METHYLPHENIDATE HYDROCHLORIDE 50 MG/1
50 CAPSULE, EXTENDED RELEASE ORAL DAILY
Qty: 30 CAPSULE | Refills: 0 | Status: SHIPPED | OUTPATIENT
Start: 2018-07-13 | End: 2018-08-12

## 2018-05-22 RX ORDER — METHYLPHENIDATE HYDROCHLORIDE 20 MG/1
20 TABLET ORAL DAILY
Qty: 30 TABLET | Refills: 0 | Status: SHIPPED | OUTPATIENT
Start: 2018-08-13 | End: 2018-08-24

## 2018-05-22 NOTE — PROGRESS NOTES
SUBJECTIVE:   Patrick Gutierrez is a 23 year old male who presents to clinic today for the following health issues:      Asthma Follow-Up    Was ACT completed today?    Yes    ACT Total Scores 10/2/2017   ACT TOTAL SCORE (Goal Greater than or Equal to 20) 25   In the past 12 months, how many times did you visit the emergency room for your asthma without being admitted to the hospital? 0   In the past 12 months, how many times were you hospitalized overnight because of your asthma? 0       Recent asthma triggers that patient is dealing with: None        Amount of exercise or physical activity: 6-7 days/week for an average of greater than 60 minutes    Problems taking medications regularly: No    Medication side effects: none    Diet: regular (no restrictions)      Medication Followup of Ritalin and Metadate    Taking Medication as prescribed: yes    Side Effects:  None    Medication Helping Symptoms:  Yes    50 mg in the am and 20mg in the afternoon  No chest pain,no sob, decrease appittie  Some irritability   Some sleep issues       History of recent ED visit for renal stones -hydrating well and pain resolved    Impression:   1.  Tiny punctate density seen near the expected location of the right ureterovesical junction, which is likely within the urinary bladder. This could represent a recently passed calculus versus artifact. Correlate with clinical symptoms. Additional mineralized densities in the pelvis are favored to represent phleboliths and less likely distal ureteral calculi.   2.  Negative appendix.    [facility]    Denies using any drugs  Asthma stable        Problem list and histories reviewed & adjusted, as indicated.  Additional history: as documented    Patient Active Problem List   Diagnosis     Exercise-induced asthma     Attention deficit hyperactivity disorder (ADHD), predominantly inattentive type     History reviewed. No pertinent surgical history.    Social History  "  Substance Use Topics     Smoking status: Never Smoker     Smokeless tobacco: Never Used     Alcohol use No     Family History   Problem Relation Age of Onset     Unknown/Adopted Mother      Unknown/Adopted Father      Unknown/Adopted Maternal Grandmother      Unknown/Adopted Maternal Grandfather      Unknown/Adopted Paternal Grandmother      Unknown/Adopted Paternal Grandfather            Reviewed and updated as needed this visit by clinical staff  Tobacco  Allergies  Meds  Med Hx  Surg Hx  Fam Hx  Soc Hx      Reviewed and updated as needed this visit by Provider         ROS:  Constitutional, HEENT, cardiovascular, pulmonary, GI, , musculoskeletal, neuro, skin, endocrine and psych systems are negative, except as otherwise noted.    OBJECTIVE:     /70 (BP Location: Right arm, Patient Position: Sitting, Cuff Size: Adult Regular)  Pulse 72  Temp 98.3  F (36.8  C)  Ht 5' 11\" (1.803 m)  Wt 170 lb 3.2 oz (77.2 kg)  SpO2 98%  BMI 23.74 kg/m2  Body mass index is 23.74 kg/(m^2).  GENERAL: healthy, alert and no distress  NECK: no adenopathy, no asymmetry, masses, or scars and thyroid normal to palpation  RESP: lungs clear to auscultation - no rales, rhonchi or wheezes  CV: regular rate and rhythm, normal S1 S2, no S3 or S4, no murmur, click or rub, no peripheral edema and peripheral pulses strong  ABDOMEN: soft, nontender, no hepatosplenomegaly, no masses and bowel sounds normal  MS: no gross musculoskeletal defects noted, no edema  PSYCH: mentation appears normal, affect normal/bright    Diagnostic Test Results:  No results found for this or any previous visit (from the past 24 hour(s)).    ASSESSMENT/PLAN:       ICD-10-CM    1. Attention deficit hyperactivity disorder (ADHD), predominantly inattentive type F90.0 Drug  Screen Comprehensive , Urine with Reported Meds (MedTox) (Pain Care Package)     methylphenidate (METADATE CD) 50 MG CR capsule     methylphenidate (RITALIN) 20 MG tablet     " methylphenidate (METADATE CD) 50 MG CR capsule     methylphenidate (METADATE CD) 50 MG CR capsule     methylphenidate (METADATE CD) 50 MG CR capsule     methylphenidate (RITALIN) 20 MG tablet     methylphenidate (RITALIN) 20 MG tablet     methylphenidate (RITALIN) 20 MG tablet   2. Screening examination for venereal disease Z11.3 NEISSERIA GONORRHOEA PCR     CHLAMYDIA TRACHOMATIS PCR     HIV Antigen Antibody Combo     Treponema Abs w Reflex to RPR and Titer   3. Exercise-induced asthma J45.990 albuterol (VENTOLIN HFA) 108 (90 Base) MCG/ACT Inhaler   4. Screening for thyroid disorder Z13.29 TSH with free T4 reflex     adhd-stable, check urine today, denies using any drugs  ashtma-stable, cont inhaler prn, flu shot  Screening std -ordered  Printed 4 months of script as he is leaving for summer camp to [place]  Will be back in the fall  Follow up for office visit in 6 months  Renal stone history -no symptoms now  See Patient Instructions    Katt Meredith DO  [facility]

## 2018-05-22 NOTE — PATIENT INSTRUCTIONS
Follow up as planned in 6 months  Script done for 4 months    Katt Meredith D.O.    [facility]   Discharged by : Shona LUNA MA  Paper scripts provided to patient : Ritalin and Metadate (4 Rx's each)     If you have any questions regarding your visit please contact your care team:     Team Gold                Clinic Hours Telephone Number     Dr. Jesenia Sandoval NP 7am-7pm  Monday - Thursday   7am-5pm  Fridays  [phone]   (Appointment scheduling available 24/7)     RN Line  [phone] option 2     Urgent Care - [place] - 11am-9pm Monday-Friday Saturday-Sunday- 9am-5pm     [place] -   5pm-9pm Monday-Friday Saturday-Sunday- 9am-5pm    [phone] - [place]    [phone] - [place]       For a Price Quote for your services, please call our Consumer Price Line at [phone].     What options do I have for visits at the clinic other than the traditional office visit?     To expand how we care for you, many of our providers are utilizing electronic visits (e-visits) and telephone visits, when medically appropriate, for interactions with their patients rather than a visit in the clinic. We also offer nurse visits for many medical concerns. Just like any other service, we will bill your insurance company for this type of visit based on time spent on the phone with your provider. Not all insurance companies cover these visits. Please check with your medical insurance if this type of visit is covered. You will be responsible for any charges that are not paid by your insurance.   E-visits via Issue: generally incur a $35.00 fee.     Telephone visits:  Time spent on the phone: *charged based on time that is spent on the phone in increments of 10 minutes. Estimated cost:   5-10 mins $30.00   11-20 mins. $59.00   21-30 mins. $85.00       Use Issue (secure email communication  and access to your chart) to send your primary care provider a message or make an appointment. Ask someone on your Team how to sign up for 37coins.     As always, Thank you for trusting us with your health care needs!      [facility]:    Scheduling Appointments  [place], [facility]  Call: [phone]    [place], [facility]  Call: [phone]    [facility]  Call: [phone]    For Gastroenterology referrals   [facility]   Clinics and Surgery Center, 4th Floor   [address] [place] [address]   Appointments: [phone]    WHERE TO GO FOR CARE?  Clinic    Make an appointment if you:       Are sick (cold, cough, flu, sore throat, earache or in pain).       Have a small injury (sprain, small cut, burn or broken bone).       Need a physical exam, Pap smear, vaccine or prescription refill.       Have questions about your health or medicines.    To reach us:      Call [phone] ([phone]). Open 24 hours every day. (For counseling services, call [phone].)    Log into 37coins at [email or web address]. (Visit [email or web address] to create an account.) Hospital emergency room    An emergency is a serious or life- threatening problem that must be treated right away.    Call [phone] or get to the hospital if you have:      Very bad or sudden:            - Chest pain or pressure         - Bleeding         - Head or belly pain         - Dizziness or trouble seeing, walking or                          Speaking      Problems breathing      Blood in your vomit or you are coughing up blood      A major injury (knocked out, loss of a finger or limb, rape, broken bone protruding from skin)    A mental health crisis. (Or call the Mental Health Crisis line at 1-326.629.5405 or Suicide Prevention Hotline at 1-816.796.8799.)    Open 24 hours every day. You don't need an appointment.     Urgent care    Visit urgent care  for sickness or small injuries when the clinic is closed. You don't need an appointment. To check hours or find an urgent care near you, visit [email or web address]. Online care    Get online care from [facility] for more than 70 common problems, like colds, allergies and infections. Open 24 hours every day at:   [email or web address]   Need help deciding?    For advice about where to be seen, you may call your clinic and ask to speak with a nurse. We're here for you 24 hours every day.         If you are deaf or hard of hearing, please let us know. We provide many free services including sign language interpreters, oral interpreters, TTYs, telephone amplifiers, note takers and written materials.

## 2018-05-22 NOTE — LETTER
[address] [address]  [phone]                                                                                                May 24, 2018    Patrick Gutierrez  [address] [place] [address]        Dear Mr. Gutierrez,    Your recent lab results were within normal limits. A copy of those results are included with this letter.        Sincerely,      Katt Meredith, DO/hl    Results for orders placed or performed in visit on 05/22/18   HIV Antigen Antibody Combo   Result Value Ref Range    HIV Antigen Antibody Combo Nonreactive NR^Nonreactive       Treponema Abs w Reflex to RPR and Titer   Result Value Ref Range    Treponema Antibodies Nonreactive NR^Nonreactive   TSH with free T4 reflex   Result Value Ref Range    TSH 1.23 0.40 - 4.00 mU/L   NEISSERIA GONORRHOEA PCR   Result Value Ref Range    Specimen Descrip Urine     N Gonorrhea PCR Negative NEG^Negative   CHLAMYDIA TRACHOMATIS PCR   Result Value Ref Range    Specimen Description Urine     Chlamydia Trachomatis PCR Negative NEG^Negative

## 2018-05-23 LAB
C TRACH DNA SPEC QL NAA+PROBE: NEGATIVE
HIV 1+2 AB+HIV1 P24 AG SERPL QL IA: NONREACTIVE
N GONORRHOEA DNA SPEC QL NAA+PROBE: NEGATIVE
SPECIMEN SOURCE: NORMAL
SPECIMEN SOURCE: NORMAL
T PALLIDUM AB SER QL: NONREACTIVE
TSH SERPL DL<=0.005 MIU/L-ACNC: 1.23 MU/L (ref 0.4–4)

## 2018-05-23 ASSESSMENT — ASTHMA QUESTIONNAIRES: ACT_TOTALSCORE: 20

## 2018-05-25 LAB — PAIN DRUG SCR UR W RPTD MEDS: NORMAL

## 2018-07-17 ENCOUNTER — TELEPHONE (OUTPATIENT)
Dept: FAMILY MEDICINE | Facility: CLINIC | Age: 23
End: 2018-07-17

## 2018-07-17 NOTE — TELEPHONE ENCOUNTER
Génesis, pharmacist from [facility] in [place] said that Dr. Meredith's KIEL number was not on any of the scripts for patient's methylphenidate. She requested her KIEL number, and I gave it to her.    Kenn Braxton RN

## 2018-08-14 ENCOUNTER — TELEPHONE (OUTPATIENT)
Dept: FAMILY MEDICINE | Facility: CLINIC | Age: 23
End: 2018-08-14

## 2018-08-14 DIAGNOSIS — F90.0 ATTENTION DEFICIT HYPERACTIVITY DISORDER (ADHD), PREDOMINANTLY INATTENTIVE TYPE: ICD-10-CM

## 2018-08-14 RX ORDER — METHYLPHENIDATE HYDROCHLORIDE 20 MG/1
20 TABLET ORAL DAILY
Qty: 30 TABLET | Refills: 0 | Status: SHIPPED | OUTPATIENT
Start: 2018-09-14 | End: 2018-08-24

## 2018-08-14 RX ORDER — METHYLPHENIDATE HYDROCHLORIDE 50 MG/1
50 CAPSULE, EXTENDED RELEASE ORAL DAILY
Qty: 30 CAPSULE | Refills: 0 | Status: CANCELLED | OUTPATIENT
Start: 2018-08-14

## 2018-08-14 RX ORDER — METHYLPHENIDATE HYDROCHLORIDE 20 MG/1
20 TABLET ORAL DAILY
Qty: 30 TABLET | Refills: 0 | Status: CANCELLED | OUTPATIENT
Start: 2018-08-14

## 2018-08-14 RX ORDER — METHYLPHENIDATE HYDROCHLORIDE 50 MG/1
50 CAPSULE, EXTENDED RELEASE ORAL DAILY
Qty: 30 CAPSULE | Refills: 0 | Status: SHIPPED | OUTPATIENT
Start: 2018-09-14 | End: 2018-08-24

## 2018-08-14 NOTE — TELEPHONE ENCOUNTER
He would need a visit for next refill , one more refill printed  Please make an appoint for patient in office visit  Katt Meredith D.O.

## 2018-08-14 NOTE — TELEPHONE ENCOUNTER
Called and reviewed urine results  He is negative, he says he has been taking meds and not sure why he is testing negative      He will be getting back  To [place] this week  Please call pharmacy and see if the have the scripts      [phone]  Katt Meredith D.O.

## 2018-08-14 NOTE — TELEPHONE ENCOUNTER
Please schedule. Walked 2 scripts to our pharmacy and handed them to a new pharmacist.  Sindhu Montano RN

## 2018-08-14 NOTE — TELEPHONE ENCOUNTER
Patient called back and said that there are times when he has had to stop the medication because he will will run out and not be able to pick them up for a few days. Please call patient with any questions

## 2018-08-14 NOTE — TELEPHONE ENCOUNTER
Reason for Call:  Other     Detailed comments: patient has a few questions about his medication and prescriptions for methylphenidate (METADATE CD) 50 MG CR capsule    Phone Number Patient can be reached at: Home number on file [phone] (home)    Best Time: any    Can we leave a detailed message on this number? YES    Call taken on 8/14/2018 at 11:39 AM by Dora Ignacio

## 2018-08-14 NOTE — TELEPHONE ENCOUNTER
He left the 8/13 scripts of both ADHD meds with [facility] and will be back at [place] on Sunday, so he would like a new script for 8/13 sent to our pharmacy since we are unable to transfer.   Also, he doesn't know where the September script is. Called our pharmacy, they have nothing on file.    states last fill was on 7/16 for both meds.  Sindhu Montano RN

## 2018-08-15 ENCOUNTER — TELEPHONE (OUTPATIENT)
Dept: FAMILY MEDICINE | Facility: CLINIC | Age: 23
End: 2018-08-15

## 2018-08-15 NOTE — TELEPHONE ENCOUNTER
Patient calling stating that he will be moving back and will be in town on Sunday. Patient is wondering if he can pick his medications up at the pharmacy on Monday? Patient can be reached at [phone].

## 2018-08-15 NOTE — TELEPHONE ENCOUNTER
Spoke to patient let him know RX were walked to pharmacy yesterday ok to    Cassandra Seth,Clinic Rn  [facility] [place]

## 2018-08-15 NOTE — TELEPHONE ENCOUNTER
Patient called and was scheduled for 08/24/18 to see Dr. Meredith  Patient would also like a nurse to call him back to discuss the urine test that was done

## 2018-08-15 NOTE — TELEPHONE ENCOUNTER
Reason for Call:  Other / Medications concern (Methylphenidate (METADATE CD) 50 MG CR capsule and methylphenidate (RITALIN) 20 MG tablet)    Detailed comments: Patient called and stated that the last refills he was given for these medications were for David 15, Jul 13 and Aug 13.  Patient also stated that he wanted to let Dr Meredith know that by the time he will be able to see her on 8/24, he will run out of medication and they will not be on his system for those days.  Please call patient back to discuss.    Phone Number Patient can be reached at: Cell number on file:    Telephone Information:  [phone]           Best Time: Anytime    Can we leave a detailed message on this number? YES    Call taken on 8/15/2018 at 4:35 PM by Nicole Riley

## 2018-08-20 NOTE — TELEPHONE ENCOUNTER
Spring states she has 2 September scripts but can't find the re-signed August scripts. It doesn't appear they were signed again. Spring will ask PCP since patient is here now, but also said to route in case she is unable to find her/wants to switch the Sept scripts to now and re-sign Sept scripts.  Sindhu Montano RN

## 2018-08-24 ENCOUNTER — OFFICE VISIT (OUTPATIENT)
Dept: FAMILY MEDICINE | Facility: CLINIC | Age: 23
End: 2018-08-24
Payer: COMMERCIAL

## 2018-08-24 VITALS
WEIGHT: 160 LBS | BODY MASS INDEX: 22.4 KG/M2 | SYSTOLIC BLOOD PRESSURE: 120 MMHG | DIASTOLIC BLOOD PRESSURE: 68 MMHG | TEMPERATURE: 97.9 F | HEART RATE: 68 BPM | HEIGHT: 71 IN

## 2018-08-24 DIAGNOSIS — F90.0 ATTENTION DEFICIT HYPERACTIVITY DISORDER (ADHD), PREDOMINANTLY INATTENTIVE TYPE: Primary | ICD-10-CM

## 2018-08-24 DIAGNOSIS — Z23 NEED FOR PROPHYLACTIC VACCINATION WITH TETANUS-DIPHTHERIA (TD): ICD-10-CM

## 2018-08-24 DIAGNOSIS — J45.990 EXERCISE-INDUCED ASTHMA: ICD-10-CM

## 2018-08-24 DIAGNOSIS — Z23 NEED FOR HPV VACCINATION: ICD-10-CM

## 2018-08-24 PROCEDURE — 99213 OFFICE O/P EST LOW 20 MIN: CPT | Mod: 25 | Performed by: FAMILY MEDICINE

## 2018-08-24 PROCEDURE — 90651 9VHPV VACCINE 2/3 DOSE IM: CPT | Performed by: FAMILY MEDICINE

## 2018-08-24 PROCEDURE — 90471 IMMUNIZATION ADMIN: CPT | Performed by: FAMILY MEDICINE

## 2018-08-24 RX ORDER — ALBUTEROL SULFATE 90 UG/1
AEROSOL, METERED RESPIRATORY (INHALATION)
Qty: 18 G | Refills: 1 | Status: SHIPPED | OUTPATIENT
Start: 2018-08-24 | End: 2019-01-07

## 2018-08-24 RX ORDER — METHYLPHENIDATE HYDROCHLORIDE 20 MG/1
20 TABLET ORAL DAILY
Qty: 30 TABLET | Refills: 0 | Status: SHIPPED | OUTPATIENT
Start: 2018-10-20 | End: 2019-01-07

## 2018-08-24 RX ORDER — METHYLPHENIDATE HYDROCHLORIDE 50 MG/1
50 CAPSULE, EXTENDED RELEASE ORAL DAILY
Qty: 30 CAPSULE | Refills: 0 | Status: SHIPPED | OUTPATIENT
Start: 2018-11-20 | End: 2018-12-20

## 2018-08-24 RX ORDER — METHYLPHENIDATE HYDROCHLORIDE 50 MG/1
50 CAPSULE, EXTENDED RELEASE ORAL DAILY
Qty: 30 CAPSULE | Refills: 0 | Status: SHIPPED | OUTPATIENT
Start: 2018-10-20 | End: 2019-01-07

## 2018-08-24 RX ORDER — METHYLPHENIDATE HYDROCHLORIDE 20 MG/1
20 TABLET ORAL DAILY
Qty: 30 TABLET | Refills: 0 | Status: SHIPPED | OUTPATIENT
Start: 2018-11-20 | End: 2019-01-07

## 2018-08-24 RX ORDER — METHYLPHENIDATE HYDROCHLORIDE 20 MG/1
20 TABLET ORAL DAILY
Qty: 30 TABLET | Refills: 0 | Status: SHIPPED | OUTPATIENT
Start: 2018-09-20 | End: 2019-01-07

## 2018-08-24 RX ORDER — METHYLPHENIDATE HYDROCHLORIDE 50 MG/1
50 CAPSULE, EXTENDED RELEASE ORAL DAILY
Qty: 30 CAPSULE | Refills: 0 | Status: SHIPPED | OUTPATIENT
Start: 2018-09-20 | End: 2019-01-07

## 2018-08-24 NOTE — MR AVS SNAPSHOT
After Visit Summary   8/24/2018    Patrick Gutierrez    MRN: [id number]           Patient Information     Date Of Birth          1995        Visit Information        Provider Department      8/24/2018 7:40 AM Katt Meredith DO [facility]        Today's Diagnoses     Attention deficit hyperactivity disorder (ADHD), predominantly inattentive type    -  1    Need for prophylactic vaccination with tetanus-diphtheria (TD)        Exercise-induced asthma          Care Instructions    Please take medication  Follow up early December for follow up [facility]   Discharged by : Rosa Maria Butt MA    Paper scripts provided to patient : yes     If you have any questions regarding your visit please contact your care team:     Team Gold                Clinic Hours Telephone Number     Dr. Jesenia Sandoval, FLAKITO Harris, CNP 7am-7pm  Monday - Thursday   7am-5pm  Fridays  [phone]   (Appointment scheduling available 24/7)     RN Line  [phone] option 2     Urgent Care - [place] - 11am-9pm Monday-Friday Saturday-Sunday- 9am-5pm     [place] -   5pm-9pm Monday-Friday Saturday-Sunday- 9am-5pm    [phone] - [place]    [phone] - [place]       For a Price Quote for your services, please call our Consumer Price Line at [phone].     What options do I have for visits at the clinic other than the traditional office visit?     To expand how we care for you, many of our providers are utilizing electronic visits (e-visits) and telephone visits, when medically appropriate, for interactions with their patients rather than a visit in the clinic. We also offer nurse visits for many medical concerns. Just like any other service, we will bill your insurance company for this type of visit based on time spent on the phone with your provider. Not all insurance  companies cover these visits. Please check with your medical insurance if this type of visit is covered. You will be responsible for any charges that are not paid by your insurance.   E-visits via The [employer] Instructor Network[employer]: generally incur a $35.00 fee.     Telephone visits:  Time spent on the phone: *charged based on time that is spent on the phone in increments of 10 minutes. Estimated cost:   5-10 mins $30.00   11-20 mins. $59.00   21-30 mins. $85.00       Use Zytoprotec (secure email communication and access to your chart) to send your primary care provider a message or make an appointment. Ask someone on your Team how to sign up for Zytoprotec.     As always, Thank you for trusting us with your health care needs!      [facility]:    Scheduling Appointments  [place]  Call: [phone]    [facility]  Call: [phone]    [facility]  Call: [phone]    For Gastroenterology referrals   [facility] Gastroenterology   Clinics and Surgery Center, 4th Floor   [address] [place] [address]   Appointments: [phone]    WHERE TO GO FOR CARE?  Clinic    Make an appointment if you:       Are sick (cold, cough, flu, sore throat, earache or in pain).       Have a small injury (sprain, small cut, burn or broken bone).       Need a physical exam, Pap smear, vaccine or prescription refill.       Have questions about your health or medicines.    To reach us:      Call [phone] ([phone]). Open 24 hours every day. (For counseling services, call [phone].)    Log into Zytoprotec at [email or web address]. (Visit [email or web address] to create an account.) Hospital emergency room    An emergency is a serious or life- threatening problem that must be treated right away.    Call [phone] or get to the hospital if you have:      Very bad or sudden:            - Chest pain or pressure         - Bleeding         - Head or belly pain          - Dizziness or trouble seeing, walking or                          Speaking      Problems breathing      Blood in your vomit or you are coughing up blood      A major injury (knocked out, loss of a finger or limb, rape, broken bone protruding from skin)    A mental health crisis. (Or call the Mental Health Crisis line at 1-309.346.5162 or Suicide Prevention Hotline at 1-538.929.6791.)    Open 24 hours every day. You don't need an appointment.     Urgent care    Visit urgent care for sickness or small injuries when the clinic is closed. You don't need an appointment. To check hours or find an urgent care near you, visit [email or web address]. Online care    Get online care from [facility] for more than 70 common problems, like colds, allergies and infections. Open 24 hours every day at:   [email or web address]   Need help deciding?    For advice about where to be seen, you may call your clinic and ask to speak with a nurse. We're here for you 24 hours every day.         If you are deaf or hard of hearing, please let us know. We provide many free services including sign language interpreters, oral interpreters, TTYs, telephone amplifiers, note takers and written materials.                   Follow-ups after your visit        Who to contact     If you have questions or need follow up information about today's clinic visit or your schedule please contact [facility] directly at [phone].  Normal or non-critical lab and imaging results will be communicated to you by MyChart, letter or phone within 4 business days after the clinic has received the results. If you do not hear from us within 7 days, please contact the clinic through MyChart or phone. If you have a critical or abnormal lab result, we will notify you by phone as soon as possible.  Submit refill requests through [employer] or call your pharmacy and they will forward the refill request to us. Please allow 3 business days for your refill to be completed.     "      Additional Information About Your Visit        Care EveryWhere ID     This is your Care EveryWhere ID. This could be used by other organizations to access your [place] medical records  [id number]        Your Vitals Were     Pulse Temperature Height BMI (Body Mass Index)          68 97.9  F (36.6  C) (Oral) 5' 11\" (1.803 m) 22.32 kg/m2         Blood Pressure from Last 3 Encounters:   08/24/18 120/68   05/22/18 118/70   01/22/18 122/72    Weight from Last 3 Encounters:   08/24/18 160 lb (72.6 kg)   05/22/18 170 lb 3.2 oz (77.2 kg)   01/22/18 161 lb (73 kg)              Today, you had the following     No orders found for display         Today's Medication Changes          These changes are accurate as of 8/24/18  8:23 AM.  If you have any questions, ask your nurse or doctor.               These medicines have changed or have updated prescriptions.        Dose/Directions    * methylphenidate 50 MG CR capsule   Commonly known as:  METADATE CD   This may have changed:  These instructions start on 9/20/2018. If you are unsure what to do until then, ask your doctor or other care provider.   Used for:  Attention deficit hyperactivity disorder (ADHD), predominantly inattentive type   Changed by:  Katt Meredith DO        Dose:  50 mg   Start taking on:  9/20/2018   Take 1 capsule (50 mg) by mouth daily   Quantity:  30 capsule   Refills:  0       * methylphenidate 20 MG tablet   Commonly known as:  RITALIN   This may have changed:  These instructions start on 9/20/2018. If you are unsure what to do until then, ask your doctor or other care provider.   Used for:  Attention deficit hyperactivity disorder (ADHD), predominantly inattentive type   Changed by:  Katt Meredith DO        Dose:  20 mg   Start taking on:  9/20/2018   Take 1 tablet (20 mg) by mouth daily   Quantity:  30 tablet   Refills:  0       * methylphenidate 50 MG CR capsule   Commonly known as:  METADATE CD   This may have changed:  " These instructions start on 10/20/2018. If you are unsure what to do until then, ask your doctor or other care provider.   Used for:  Attention deficit hyperactivity disorder (ADHD), predominantly inattentive type   Changed by:  Katt Meredith DO        Dose:  50 mg   Start taking on:  10/20/2018   Take 1 capsule (50 mg) by mouth daily   Quantity:  30 capsule   Refills:  0       * methylphenidate 20 MG tablet   Commonly known as:  RITALIN   This may have changed:  These instructions start on 10/20/2018. If you are unsure what to do until then, ask your doctor or other care provider.   Used for:  Attention deficit hyperactivity disorder (ADHD), predominantly inattentive type   Changed by:  Katt Meredith DO        Dose:  20 mg   Start taking on:  10/20/2018   Take 1 tablet (20 mg) by mouth daily   Quantity:  30 tablet   Refills:  0       * methylphenidate 20 MG tablet   Commonly known as:  RITALIN   This may have changed:  These instructions start on 11/20/2018. If you are unsure what to do until then, ask your doctor or other care provider.   Used for:  Attention deficit hyperactivity disorder (ADHD), predominantly inattentive type   Changed by:  Katt Meredith DO        Dose:  20 mg   Start taking on:  11/20/2018   Take 1 tablet (20 mg) by mouth daily   Quantity:  30 tablet   Refills:  0       * methylphenidate 50 MG CR capsule   Commonly known as:  METADATE CD   This may have changed:  You were already taking a medication with the same name, and this prescription was added. Make sure you understand how and when to take each.   Used for:  Attention deficit hyperactivity disorder (ADHD), predominantly inattentive type   Changed by:  Katt Meredith DO        Dose:  50 mg   Start taking on:  11/20/2018   Take 1 capsule (50 mg) by mouth daily   Quantity:  30 capsule   Refills:  0       * Notice:  This list has 6 medication(s) that are the same as other medications prescribed for  you. Read the directions carefully, and ask your doctor or other care provider to review them with you.         Where to get your medicines      These medications were sent to [facility] [place] - [address]  [address] [place] [address]., [facility] [address]     Phone:  [phone]     albuterol 108 (90 Base) MCG/ACT inhaler         Some of these will need a paper prescription and others can be bought over the counter.  Ask your nurse if you have questions.     Bring a paper prescription for each of these medications     methylphenidate 20 MG tablet    methylphenidate 20 MG tablet    methylphenidate 20 MG tablet    methylphenidate 50 MG CR capsule    methylphenidate 50 MG CR capsule    methylphenidate 50 MG CR capsule                Primary Care Provider Office Phone # Fax #    Katt Meredith [phone]       [address] [place] [address]        Equal Access to Services     HOPE BOTELLO : Hadii aad ku hadasho Sowayne, waaxda luqadaha, qaybta kaalmada adeegyada, rosa maria kaplan . So [facility] [phone].    ATENCIÓN: Si habla español, tiene a lopez disposición servicios gratuitos de asistencia lingüística. Kristy al [phone].    We comply with applicable federal civil rights laws and [place] laws. We do not discriminate on the basis of race, color, national origin, age, disability, sex, sexual orientation, or gender identity.            Thank you!     Thank you for choosing [facility]  for your care. Our goal is always to provide you with excellent care. Hearing back from our patients is one way we can continue to improve our services. Please take a few minutes to complete the written survey that you may receive in the mail after your visit with us. Thank you!             Your Updated Medication List - Protect others around you: Learn how to safely use, store and throw away your medicines at  www.disposemymeds.org.          This list is accurate as of 8/24/18  8:23 AM.  Always use your most recent med list.                   Brand Name Dispense Instructions for use Diagnosis    albuterol 108 (90 Base) MCG/ACT inhaler    VENTOLIN HFA    18 g    INHALE 2 PUFFS INTO THE LUNGS EVERY 6 HOURS AS NEEDED FOR SHORTNESS OF BREATH/DYSPNEA OR WHEEZING    Exercise-induced asthma       * methylphenidate 50 MG CR capsule   Start taking on:  9/20/2018    METADATE CD    30 capsule    Take 1 capsule (50 mg) by mouth daily    Attention deficit hyperactivity disorder (ADHD), predominantly inattentive type       * methylphenidate 20 MG tablet   Start taking on:  9/20/2018    RITALIN    30 tablet    Take 1 tablet (20 mg) by mouth daily    Attention deficit hyperactivity disorder (ADHD), predominantly inattentive type       * methylphenidate 50 MG CR capsule   Start taking on:  10/20/2018    METADATE CD    30 capsule    Take 1 capsule (50 mg) by mouth daily    Attention deficit hyperactivity disorder (ADHD), predominantly inattentive type       * methylphenidate 20 MG tablet   Start taking on:  10/20/2018    RITALIN    30 tablet    Take 1 tablet (20 mg) by mouth daily    Attention deficit hyperactivity disorder (ADHD), predominantly inattentive type       * methylphenidate 20 MG tablet   Start taking on:  11/20/2018    RITALIN    30 tablet    Take 1 tablet (20 mg) by mouth daily    Attention deficit hyperactivity disorder (ADHD), predominantly inattentive type       * methylphenidate 50 MG CR capsule   Start taking on:  11/20/2018    METADATE CD    30 capsule    Take 1 capsule (50 mg) by mouth daily    Attention deficit hyperactivity disorder (ADHD), predominantly inattentive type       * Notice:  This list has 6 medication(s) that are the same as other medications prescribed for you. Read the directions carefully, and ask your doctor or other care provider to review them with you.

## 2018-08-24 NOTE — PROGRESS NOTES
"  SUBJECTIVE:   Patrick Gutierrez is a 23 year old male who presents to clinic today for the following health issues:      Medication Followup of  Ritalin and Metadate    Taking Medication as prescribed: yes    Side Effects:  None    Medication Helping Symptoms:  yes     Does not take them regularly and on the weekends  Taking them  Working at [employer]  One more year at [employer]              Problem list and histories reviewed & adjusted, as indicated.  Additional history: as documented    Patient Active Problem List   Diagnosis     Exercise-induced asthma     Attention deficit hyperactivity disorder (ADHD), predominantly inattentive type     History reviewed. No pertinent surgical history.    Social History   Substance Use Topics     Smoking status: Never Smoker     Smokeless tobacco: Never Used     Alcohol use No     Family History   Problem Relation Age of Onset     Unknown/Adopted Mother      Unknown/Adopted Father      Unknown/Adopted Maternal Grandmother      Unknown/Adopted Maternal Grandfather      Unknown/Adopted Paternal Grandmother      Unknown/Adopted Paternal Grandfather            Reviewed and updated as needed this visit by clinical staff  Tobacco  Allergies  Meds  Med Hx  Surg Hx  Fam Hx  Soc Hx      Reviewed and updated as needed this visit by Provider         ROS:  Constitutional, HEENT, cardiovascular, pulmonary, GI, , musculoskeletal, neuro, skin, endocrine and psych systems are negative, except as otherwise noted.    OBJECTIVE:     /68 (BP Location: Right arm, Patient Position: Sitting, Cuff Size: Adult Large)  Pulse 68  Temp 97.9  F (36.6  C) (Oral)  Ht 5' 11\" (1.803 m)  Wt 160 lb (72.6 kg)  BMI 22.32 kg/m2  Body mass index is 22.32 kg/(m^2).  GENERAL: healthy, alert and no distress  EYES: Eyes grossly normal to inspection, PERRL and conjunctivae and sclerae normal  RESP: lungs clear to auscultation - no rales, rhonchi or wheezes  CV: regular rate and rhythm, normal S1 S2, no S3 or " S4, no murmur, click or rub, no peripheral edema and peripheral pulses strong  ABDOMEN: soft, nontender, no hepatosplenomegaly, no masses and bowel sounds normal  MS: no gross musculoskeletal defects noted, no edema  SKIN: no suspicious lesions or rashes  NEURO: Normal strength and tone, mentation intact and speech normal  PSYCH: mentation appears normal, affect normal/bright    Diagnostic Test Results:  none     ASSESSMENT/PLAN:       ICD-10-CM    1. Attention deficit hyperactivity disorder (ADHD), predominantly inattentive type F90.0 methylphenidate (METADATE CD) 50 MG CR capsule     methylphenidate (RITALIN) 20 MG tablet     methylphenidate (METADATE CD) 50 MG CR capsule     methylphenidate (RITALIN) 20 MG tablet     methylphenidate (RITALIN) 20 MG tablet     methylphenidate (METADATE CD) 50 MG CR capsule   2. Need for prophylactic vaccination with tetanus-diphtheria (TD) Z23    3. Exercise-induced asthma J45.990 albuterol (VENTOLIN HFA) 108 (90 Base) MCG/ACT inhaler   4. Need for HPV vaccination Z23 HPV, IM (9 - 26 YRS) - Gardasil 9     Reviewed risks and benefits of med  Reviewed lab results, per patient he does not take med daily and he forgot to tell provider that he had not taken med for 3 -4days prior to drug test  Follow up every 3 months for now until future notice  He knows we can do drug test at any time in the future    Shots updated    See Patient Instructions    Katt Meredith DO  [facility]

## 2018-08-24 NOTE — PATIENT INSTRUCTIONS
Please take medication  Follow up early December for follow up [facility]   Discharged by : Rosa Maria Butt MA    Paper scripts provided to patient : yes     If you have any questions regarding your visit please contact your care team:     Team Gold                Clinic Hours Telephone Number     Dr. Jesenia Sandoval, FLAKITO  Lynn Steven, CNP 7am-7pm  Monday - Thursday   7am-5pm  Fridays  [phone]   (Appointment scheduling available 24/7)     RN Line  [phone] option 2     Urgent Care - [place] - 11am-9pm Monday-Friday Saturday-Sunday- 9am-5pm     [place] -   5pm-9pm Monday-Friday Saturday-Sunday- 9am-5pm    [phone] - [place]    [phone] - [place]       For a Price Quote for your services, please call our Stat Price Line at [phone].     What options do I have for visits at the clinic other than the traditional office visit?     To expand how we care for you, many of our providers are utilizing electronic visits (e-visits) and telephone visits, when medically appropriate, for interactions with their patients rather than a visit in the clinic. We also offer nurse visits for many medical concerns. Just like any other service, we will bill your insurance company for this type of visit based on time spent on the phone with your provider. Not all insurance companies cover these visits. Please check with your medical insurance if this type of visit is covered. You will be responsible for any charges that are not paid by your insurance.   E-visits via ThermoAura: generally incur a $35.00 fee.     Telephone visits:  Time spent on the phone: *charged based on time that is spent on the phone in increments of 10 minutes. Estimated cost:   5-10 mins $30.00   11-20 mins. $59.00   21-30 mins. $85.00       Use ThermoAura (secure email communication and access to your chart) to send your  primary care provider a message or make an appointment. Ask someone on your Team how to sign up for Chooos.     As always, Thank you for trusting us with your health care needs!      [facility]:    Scheduling Appointments  [place]  Call: [phone]    [facility]  Call: [phone]    [facility]  Call: [phone]    For Gastroenterology referrals   [facility] Gastroenterology   Clinics and Surgery [place], 4th Floor   [address] [place] [address]   Appointments: [phone]    WHERE TO GO FOR CARE?  Clinic    Make an appointment if you:       Are sick (cold, cough, flu, sore throat, earache or in pain).       Have a small injury (sprain, small cut, burn or broken bone).       Need a physical exam, Pap smear, vaccine or prescription refill.       Have questions about your health or medicines.    To reach us:      Call [phone] ([phone]). Open 24 hours every day. (For counseling services, call [phone].)    Log into Chooos at [email or web address]. (Visit [email or web address] to create an account.) Hospital emergency room    An emergency is a serious or life- threatening problem that must be treated right away.    Call [phone] or get to the hospital if you have:      Very bad or sudden:            - Chest pain or pressure         - Bleeding         - Head or belly pain         - Dizziness or trouble seeing, walking or                          Speaking      Problems breathing      Blood in your vomit or you are coughing up blood      A major injury (knocked out, loss of a finger or limb, rape, broken bone protruding from skin)    A mental health crisis. (Or call the Mental Health Crisis line at 1-678.638.2373 or Suicide Prevention Hotline at 1-429.198.9446.)    Open 24 hours every day. You don't need an appointment.     Urgent care    Visit urgent care for sickness or small injuries when the  clinic is closed. You don't need an appointment. To check hours or find an urgent care near you, visit [email or web address]. Online care    Get online care from [facility] for more than 70 common problems, like colds, allergies and infections. Open 24 hours every day at:   [email or web address]   Need help deciding?    For advice about where to be seen, you may call your clinic and ask to speak with a nurse. We're here for you 24 hours every day.         If you are deaf or hard of hearing, please let us know. We provide many free services including sign language interpreters, oral interpreters, TTYs, telephone amplifiers, note takers and written materials.

## 2018-09-14 DIAGNOSIS — F90.0 ATTENTION DEFICIT HYPERACTIVITY DISORDER (ADHD), PREDOMINANTLY INATTENTIVE TYPE: ICD-10-CM

## 2018-09-14 RX ORDER — METHYLPHENIDATE HYDROCHLORIDE 50 MG/1
50 CAPSULE, EXTENDED RELEASE ORAL DAILY
Qty: 30 CAPSULE | Refills: 0 | Status: CANCELLED | OUTPATIENT
Start: 2018-09-20

## 2018-12-10 ENCOUNTER — OFFICE VISIT (OUTPATIENT)
Dept: FAMILY MEDICINE | Facility: CLINIC | Age: 23
End: 2018-12-10
Payer: COMMERCIAL

## 2018-12-10 VITALS
TEMPERATURE: 98.1 F | HEIGHT: 71 IN | BODY MASS INDEX: 23.66 KG/M2 | DIASTOLIC BLOOD PRESSURE: 70 MMHG | SYSTOLIC BLOOD PRESSURE: 114 MMHG | WEIGHT: 169 LBS | HEART RATE: 72 BPM

## 2018-12-10 DIAGNOSIS — F90.0 ATTENTION DEFICIT HYPERACTIVITY DISORDER (ADHD), PREDOMINANTLY INATTENTIVE TYPE: ICD-10-CM

## 2018-12-10 DIAGNOSIS — R00.0 RAPID HEART RATE: ICD-10-CM

## 2018-12-10 DIAGNOSIS — G47.00 INSOMNIA, UNSPECIFIED TYPE: Primary | ICD-10-CM

## 2018-12-10 PROCEDURE — 99214 OFFICE O/P EST MOD 30 MIN: CPT | Performed by: FAMILY MEDICINE

## 2018-12-10 PROCEDURE — 93000 ELECTROCARDIOGRAM COMPLETE: CPT | Performed by: FAMILY MEDICINE

## 2018-12-10 RX ORDER — METHYLPHENIDATE HYDROCHLORIDE 50 MG/1
50 CAPSULE, EXTENDED RELEASE ORAL DAILY
Qty: 30 CAPSULE | Refills: 0 | Status: CANCELLED | OUTPATIENT
Start: 2018-12-10

## 2018-12-10 RX ORDER — METHYLPHENIDATE HYDROCHLORIDE 20 MG/1
20 TABLET ORAL DAILY
Qty: 30 TABLET | Refills: 0 | Status: CANCELLED | OUTPATIENT
Start: 2018-12-10

## 2018-12-10 RX ORDER — DEXTROAMPHETAMINE SACCHARATE, AMPHETAMINE ASPARTATE, DEXTROAMPHETAMINE SULFATE AND AMPHETAMINE SULFATE 1.25; 1.25; 1.25; 1.25 MG/1; MG/1; MG/1; MG/1
5 TABLET ORAL DAILY
Qty: 30 TABLET | Refills: 0 | Status: SHIPPED | OUTPATIENT
Start: 2018-12-10 | End: 2019-01-07

## 2018-12-10 RX ORDER — DEXTROAMPHETAMINE SACCHARATE, AMPHETAMINE ASPARTATE MONOHYDRATE, DEXTROAMPHETAMINE SULFATE AND AMPHETAMINE SULFATE 5; 5; 5; 5 MG/1; MG/1; MG/1; MG/1
20 CAPSULE, EXTENDED RELEASE ORAL DAILY
Qty: 30 CAPSULE | Refills: 0 | Status: SHIPPED | OUTPATIENT
Start: 2018-12-10 | End: 2019-01-07

## 2018-12-10 ASSESSMENT — MIFFLIN-ST. JEOR: SCORE: 1783.71

## 2018-12-10 NOTE — PATIENT INSTRUCTIONS
I recommend trying to take Ritalin earlier in the day during school breaks.   Take adderall as directed.   Follow up with Psychiatry as discussed today.     Close monitor anxiety, insomnia, rapid heart rate, and fatigue.   Follow up here in one months for ADHD recheck and side effects.

## 2018-12-10 NOTE — PROGRESS NOTES
"  SUBJECTIVE:   Patrick Gutierrez is a 23 year old male who presents to clinic today for the following health issues:    May want to discuss a swtich of medications. He would like to discuss the Metadate.     Medication Followup of Metadate CD    Taking Medication as prescribed: yes    Side Effects:  None    Medication Helping Symptoms:  NO     Medication Followup of Ritalin    Taking Medication as prescribed: yes    Side Effects:  None    Medication Helping Symptoms:  NO    School going well, this week has finals therefore is a little stressed. He is a ricky this year.   No changes to medications. Has enough medications for this week.  Medications help him become \"super focused and achieving As and Bs.    However, he reports that he has been experiencing side effects for many years and getting annoying now of anxiety, heart racing, and insomnia. However, he stated that he does not know if this maybe due to diet or sleep habit due to history of celiac disease or being lactose intolerant, and sleeping late.     Anxiety is not recent, ignored it for many years, not worsening. No significant life changes. He stated that once the medication wears off, anxiety weans down.   He reports that after taking his adderall medications he experiences rapid heart rate, that is not situational, and is persistent throughout the day. However, he also stated that he is also taking Ventolin. Insomnia is chronic. Not able to fall asleep until 3am or 4am.     He would like to try new medications.     He reports that he takes metadate 50mg early in the morning and Retalin 20mg at night around 4pm and 5pm for night class or night job.     Nightmares  Since last year, he has been experiencing nightmares about parents manly.     Medications for ADHD he has tried are:  Vyvanse at age 15, but side effects were austin and angry.   Strattera, side effects were medication being hard on his stomach, as well as being \"flat\" and without emotions. "   concerta was good for a while then lost its effectiveness  Never tried adderall.       Asthma  Controlled. He would like refills for albuterol.       Problem list and histories reviewed & adjusted, as indicated.  Additional history: as documented    Patient Active Problem List   Diagnosis     Exercise-induced asthma     Attention deficit hyperactivity disorder (ADHD), predominantly inattentive type     History reviewed. No pertinent surgical history.    Social History     Tobacco Use     Smoking status: Never Smoker     Smokeless tobacco: Never Used   Substance Use Topics     Alcohol use: No     Family History   Problem Relation Age of Onset     Unknown/Adopted Mother      Unknown/Adopted Father      Unknown/Adopted Maternal Grandmother      Unknown/Adopted Maternal Grandfather      Unknown/Adopted Paternal Grandmother      Unknown/Adopted Paternal Grandfather          Current Outpatient Medications   Medication Sig Dispense Refill     albuterol (VENTOLIN HFA) 108 (90 Base) MCG/ACT inhaler INHALE 2 PUFFS INTO THE LUNGS EVERY 6 HOURS AS NEEDED FOR SHORTNESS OF BREATH/DYSPNEA OR WHEEZING 18 g 1     amphetamine-dextroamphetamine (ADDERALL XR) 20 MG 24 hr capsule Take 1 capsule (20 mg) by mouth daily 30 capsule 0     amphetamine-dextroamphetamine (ADDERALL) 5 MG tablet Take 1 tablet (5 mg) by mouth daily In the afternoon 30 tablet 0     methylphenidate (METADATE CD) 50 MG CR capsule Take 1 capsule (50 mg) by mouth daily 30 capsule 0     methylphenidate (METADATE CD) 50 MG CR capsule Take 1 capsule (50 mg) by mouth daily 30 capsule 0     methylphenidate (METADATE CD) 50 MG CR capsule Take 1 capsule (50 mg) by mouth daily 30 capsule 0     methylphenidate (RITALIN) 20 MG tablet Take 1 tablet (20 mg) by mouth daily 30 tablet 0     methylphenidate (RITALIN) 20 MG tablet Take 1 tablet (20 mg) by mouth daily 30 tablet 0     methylphenidate (RITALIN) 20 MG tablet Take 1 tablet (20 mg) by mouth daily 30 tablet 0     No Known  "Allergies  Recent Labs   Lab Test 05/22/18  1333 08/22/16   ALT  --  30   CR  --  1.01   GFRESTIMATED  --  >90   GFRESTBLACK  --  >90   POTASSIUM  --  4.5   TSH 1.23 0.796      BP Readings from Last 3 Encounters:   12/10/18 114/70   08/24/18 120/68   05/22/18 118/70    Wt Readings from Last 3 Encounters:   12/10/18 76.7 kg (169 lb)   08/24/18 72.6 kg (160 lb)   05/22/18 77.2 kg (170 lb 3.2 oz)                  Labs reviewed in EPIC    Reviewed and updated as needed this visit by clinical staff  Tobacco  Allergies  Meds  Med Hx  Surg Hx  Fam Hx  Soc Hx      Reviewed and updated as needed this visit by Provider         ROS:  Constitutional, HEENT, cardiovascular, pulmonary, GI, , musculoskeletal, neuro, skin, endocrine and psych systems are negative, except as otherwise noted.    This document serves as a record of the services and decisions personally performed and made by Katt Meredith D.O. It was created on her behalf by Tavares Madrigal, a trained medical scribe. The creation of this document is based on the provider's statements to the medical scribe.  Tavares Madrigal December 10, 2018 7:01 AM      OBJECTIVE:     /70   Pulse 72   Temp 98.1  F (36.7  C) (Oral)   Ht 1.803 m (5' 11\")   Wt 76.7 kg (169 lb)   BMI 23.57 kg/m    Body mass index is 23.57 kg/m .  GENERAL: healthy, alert and no distress  CV; RRR. No murmur  Lungs-clear to auscultation  NEURO: Normal strength and tone, mentation intact and speech normal  PSYCH: mentation appears normal, affect normal/bright    Diagnostic Test Results:  EKG - unremarkable    ASSESSMENT/PLAN:           Tobacco Cessation:   reports that  has never smoked. he has never used smokeless tobacco.      BMI:   Estimated body mass index is 23.57 kg/m  as calculated from the following:    Height as of this encounter: 1.803 m (5' 11\").    Weight as of this encounter: 76.7 kg (169 lb).         ICD-10-CM    1. Insomnia, unspecified type G47.00 MENTAL HEALTH REFERRAL  - " "Adult; Psychiatry and Medication Management; [place]iat[place] [facility] [phone].  Medication management & future refills will be returned to [facility] upon completion of evaluation; We dom...   2. Attention deficit hyperactivity disorder (ADHD), predominantly inattentive type F90.0 amphetamine-dextroamphetamine (ADDERALL XR) 20 MG 24 hr capsule     amphetamine-dextroamphetamine (ADDERALL) 5 MG tablet     MENTAL HEALTH REFERRAL  - Adult; Psychiatry and Medication Management; [place]iatry; [facility] [phone].  Medication management & future refills will be returned to [facility] upon completion of evaluation; We dom...   3. Rapid heart rate R00.0 EKG 12-lead complete w/read - Clinics     School going well, this week has finals therefore is a little stressed. He is a ricky this year.     No changes to medications. Has enough medications for this week.  Medications help him become \"super focused and achieving As and Bs.    However, he reports that he has been experiencing side effects for many years and getting annoying now of anxiety, heart racing, and insomnia. However, he stated that he does not know if these side effects are caused by diet or sleep habit due to history of celiac disease or being lactose intolerant, and sleeping late.     Anxiety is not recent, ignored it for many years, not worsening. Once medication wears off, anxiety weans down.   He reports that after taking his stimulant he experiences rapid heart rate, that is not situational, and is persistent throughout the day. However, he also stated that he is also taking Ventolin. Insomnia is chronic. Not able to fall asleep until 3am or 4am. Declined trial of anxiety med    EKG today was unremarkable. Vitals and exam normal  Risks and benefits to medications discussed. Patient would like to start taking Adderall. Advised patient to follow up with Psychiatry for evaluation and potentially " adjustment of med.    Advised patient to close monitor for worsening symptoms and keeping a diary.      Follow up in one month    Patient instructions discussed with patient  The information in this document, created by the medical scribe for me, accurately reflects the services I personally performed and the decisions made by me. I have reviewed and approved this document for accuracy prior to leaving the patient care area.  December 10, 2018 7:49 AM   Katt Meredith DO  [facility]

## 2018-12-20 ENCOUNTER — TELEPHONE (OUTPATIENT)
Dept: FAMILY MEDICINE | Facility: CLINIC | Age: 23
End: 2018-12-20

## 2018-12-20 NOTE — TELEPHONE ENCOUNTER
Reason for Call:  Other     Detailed comments: patient called and said that he thinks that the dose of the medication is too low amphetamine-dextroamphetamine (ADDERALL XR)    Phone Number Patient can be reached at: Home number on file [phone] (home)    Best Time: any    Can we leave a detailed message on this number? YES    Call taken on 12/20/2018 at 1:36 PM by Dora Ignacio

## 2018-12-20 NOTE — TELEPHONE ENCOUNTER
Seen 12/10 for ADHD.   Called patient and his dreams have improved but he completed his finals and has an easier schedule and is going home for the holidays.  He addressed some issues with parents and they are recommending biblical counseling rather the the recommended psychiatry evaluation.  He would like to increase his dose for when he goes back to school.  He is scheduled 1/7.  Sindhu Montano RN

## 2018-12-21 NOTE — TELEPHONE ENCOUNTER
We need to discuss weather short acting or long acting should be increased, needs a visit for this.  I am opening up my schedule next week so easier for him to be seen sooner if needed  Thanks  Katt Meredith D.O.

## 2018-12-21 NOTE — TELEPHONE ENCOUNTER
Called patient, relayed message & patient verbalized understanding.   He prefers to keep his 1/7 appt.  Sindhu Montano RN

## 2019-01-07 ENCOUNTER — OFFICE VISIT (OUTPATIENT)
Dept: FAMILY MEDICINE | Facility: CLINIC | Age: 24
End: 2019-01-07
Payer: COMMERCIAL

## 2019-01-07 VITALS
TEMPERATURE: 98.1 F | HEART RATE: 72 BPM | HEIGHT: 74 IN | WEIGHT: 172 LBS | DIASTOLIC BLOOD PRESSURE: 68 MMHG | SYSTOLIC BLOOD PRESSURE: 112 MMHG | BODY MASS INDEX: 22.07 KG/M2

## 2019-01-07 DIAGNOSIS — F90.0 ATTENTION DEFICIT HYPERACTIVITY DISORDER (ADHD), PREDOMINANTLY INATTENTIVE TYPE: ICD-10-CM

## 2019-01-07 DIAGNOSIS — J45.990 EXERCISE-INDUCED ASTHMA: ICD-10-CM

## 2019-01-07 PROCEDURE — 99214 OFFICE O/P EST MOD 30 MIN: CPT | Performed by: FAMILY MEDICINE

## 2019-01-07 RX ORDER — METHYLPHENIDATE HYDROCHLORIDE 20 MG/1
20 TABLET ORAL DAILY
Qty: 30 TABLET | Refills: 0 | Status: CANCELLED | OUTPATIENT
Start: 2019-01-07

## 2019-01-07 RX ORDER — DEXTROAMPHETAMINE SACCHARATE, AMPHETAMINE ASPARTATE, DEXTROAMPHETAMINE SULFATE AND AMPHETAMINE SULFATE 1.25; 1.25; 1.25; 1.25 MG/1; MG/1; MG/1; MG/1
5 TABLET ORAL DAILY
Qty: 30 TABLET | Refills: 0 | Status: CANCELLED | OUTPATIENT
Start: 2019-01-07

## 2019-01-07 RX ORDER — DEXTROAMPHETAMINE SACCHARATE, AMPHETAMINE ASPARTATE, DEXTROAMPHETAMINE SULFATE AND AMPHETAMINE SULFATE 1.25; 1.25; 1.25; 1.25 MG/1; MG/1; MG/1; MG/1
5 TABLET ORAL DAILY
Qty: 30 TABLET | Refills: 0 | Status: SHIPPED | OUTPATIENT
Start: 2019-01-07 | End: 2019-02-04

## 2019-01-07 RX ORDER — ALBUTEROL SULFATE 90 UG/1
AEROSOL, METERED RESPIRATORY (INHALATION)
Qty: 18 G | Refills: 1 | Status: SHIPPED | OUTPATIENT
Start: 2019-01-07 | End: 2020-08-20

## 2019-01-07 RX ORDER — METHYLPHENIDATE HYDROCHLORIDE 50 MG/1
50 CAPSULE, EXTENDED RELEASE ORAL DAILY
Qty: 30 CAPSULE | Refills: 0 | Status: CANCELLED | OUTPATIENT
Start: 2019-01-07

## 2019-01-07 RX ORDER — DEXTROAMPHETAMINE SACCHARATE, AMPHETAMINE ASPARTATE MONOHYDRATE, DEXTROAMPHETAMINE SULFATE AND AMPHETAMINE SULFATE 7.5; 7.5; 7.5; 7.5 MG/1; MG/1; MG/1; MG/1
30 CAPSULE, EXTENDED RELEASE ORAL DAILY
Qty: 30 CAPSULE | Refills: 0 | Status: CANCELLED | OUTPATIENT
Start: 2019-02-07 | End: 2019-03-09

## 2019-01-07 RX ORDER — DEXTROAMPHETAMINE SACCHARATE, AMPHETAMINE ASPARTATE MONOHYDRATE, DEXTROAMPHETAMINE SULFATE AND AMPHETAMINE SULFATE 5; 5; 5; 5 MG/1; MG/1; MG/1; MG/1
20 CAPSULE, EXTENDED RELEASE ORAL DAILY
Qty: 30 CAPSULE | Refills: 0 | Status: CANCELLED | OUTPATIENT
Start: 2019-01-07

## 2019-01-07 RX ORDER — DEXTROAMPHETAMINE SACCHARATE, AMPHETAMINE ASPARTATE MONOHYDRATE, DEXTROAMPHETAMINE SULFATE AND AMPHETAMINE SULFATE 7.5; 7.5; 7.5; 7.5 MG/1; MG/1; MG/1; MG/1
30 CAPSULE, EXTENDED RELEASE ORAL DAILY
Qty: 30 CAPSULE | Refills: 0 | Status: CANCELLED | OUTPATIENT
Start: 2019-01-07 | End: 2019-02-06

## 2019-01-07 RX ORDER — DEXTROAMPHETAMINE SACCHARATE, AMPHETAMINE ASPARTATE MONOHYDRATE, DEXTROAMPHETAMINE SULFATE AND AMPHETAMINE SULFATE 7.5; 7.5; 7.5; 7.5 MG/1; MG/1; MG/1; MG/1
30 CAPSULE, EXTENDED RELEASE ORAL DAILY
Qty: 30 CAPSULE | Refills: 0 | Status: SHIPPED | OUTPATIENT
Start: 2019-03-10 | End: 2019-02-04

## 2019-01-07 ASSESSMENT — MIFFLIN-ST. JEOR: SCORE: 1844.94

## 2019-01-07 NOTE — PATIENT INSTRUCTIONS
Increase dose on adderall XR to 30mg as directed.   Close monitor of breathing symptoms as discussed today. Follow up if worsening or persisting.     Follow up in one month.       [facility]   Discharged by : Michael Henson CMA on 1/7/2019 at 7:14 AM    Paper scripts provided to patient :      If you have any questions regarding your visit please contact your care team:     Team Gold                Clinic Hours Telephone Number     Dr. Jesenia Harris, CNP 7am-7pm  Monday - Thursday   7am-5pm  Fridays  [phone]   (Appointment scheduling available 24/7)     RN Line  [phone] option 2     Urgent Care - [place] and [place] - 11am-9pm Monday-Friday Saturday-Sunday- 9am-5pm     [place] -   5pm-9pm Monday-Friday Saturday-Sunday- 9am-5pm    [phone] - [place]    [phone] - [place]     For a Price Quote for your services, please call our Consumer Price Line at [phone].     What options do I have for visits at the clinic other than the traditional office visit?     To expand how we care for you, many of our providers are utilizing electronic visits (e-visits) and telephone visits, when medically appropriate, for interactions with their patients rather than a visit in the clinic. We also offer nurse visits for many medical concerns. Just like any other service, we will bill your insurance company for this type of visit based on time spent on the phone with your provider. Not all insurance companies cover these visits. Please check with your medical insurance if this type of visit is covered. You will be responsible for any charges that are not paid by your insurance.   E-visits via [employer]: generally incur a $35.00 fee.     Telephone visits:  Time spent on the phone: *charged based on time that is spent on the phone in increments of 10 minutes. Estimated cost:   5-10 mins $30.00   11-20 mins.  $59.00   21-30 mins. $85.00     Use [employer] (secure email communication and access to your chart) to send your primary care provider a message or make an appointment. Ask someone on your Team how to sign up for [employer].     As always, Thank you for trusting us with your health care needs!    [facility]:    Scheduling Appointments  [facility]  Call: [phone]    [facility]  Call: [phone]    [facility]  Call: [phone]    For Gastroenterology referrals   [facility]   Clinics and Surgery Center, 4th Floor   [address] [place] [address]   Appointments: [phone]    WHERE TO GO FOR CARE?  Clinic    Make an appointment if you:       Are sick (cold, cough, flu, sore throat, earache or in pain).       Have a small injury (sprain, small cut, burn or broken bone).       Need a physical exam, Pap smear, vaccine or prescription refill.       Have questions about your health or medicines.    To reach us:      Call [phone] ([phone]). Open 24 hours every day. (For counseling services, call [phone].)    Log into [employer] at [email or web address]. (Visit [email or web address] to create an account.) Hospital emergency room    An emergency is a serious or life- threatening problem that must be treated right away.    Call 488 or get to the hospital if you have:      Very bad or sudden:            - Chest pain or pressure         - Bleeding         - Head or belly pain         - Dizziness or trouble seeing, walking or                          Speaking      Problems breathing      Blood in your vomit or you are coughing up blood      A major injury (knocked out, loss of a finger or limb, rape, broken bone protruding from skin)    A mental health crisis. (Or call the Mental Health Crisis line at 1-250.410.3455 or Suicide Prevention Hotline at 1-699.401.2693.)    Open 24 hours every day.  You don't need an appointment.     Urgent care    Visit urgent care for sickness or small injuries when the clinic is closed. You don't need an appointment. To check hours or find an urgent care near you, visit [email or web address]. Online care    Get online care from [facility] for more than 70 common problems, like colds, allergies and infections. Open 24 hours every day at:   [email or web address]   Need help deciding?    For advice about where to be seen, you may call your clinic and ask to speak with a nurse. We're here for you 24 hours every day.         If you are deaf or hard of hearing, please let us know. We provide many free services including sign language interpreters, oral interpreters, TTYs, telephone amplifiers, note takers and written materials.

## 2019-01-07 NOTE — PROGRESS NOTES
"  SUBJECTIVE:   Patrick Gutierrez is a 23 year old male who presents to clinic today for the following health issues:      Asthma Follow-Up    Was ACT completed today?    Yes    ACT Total Scores 1/7/2019   ACT TOTAL SCORE (Goal Greater than or Equal to 20) 14   In the past 12 months, how many times did you visit the emergency room for your asthma without being admitted to the hospital? 0   In the past 12 months, how many times were you hospitalized overnight because of your asthma? 0       Recent asthma triggers that patient is dealing with: None    He reports that he has been changing his diet, limiting soda and chips. He reports that he has not been following his celiac diet and has resumed this now.     He attributes uncontrolled asthma symptoms to diet changes, as he reports that he experienced changing symptoms to his asthma when he changed his diet three weeks ago.     He reports that he is waking up with chest tightness and has noticed more wheezing now. He stated that during his work outs he would need to stop.     He is not worried about these symptoms.     He reports that he experiences rhinorrea and notes that he has a history of seasonal allergies.   He stated that he cannot recall if he has ever tried steroid inhaler in the past   He did not use his albuterol inhaler today.   Denies cold like symptoms. He reports that he is otherwise healthy       He reports that he eats at night time, due to not hungry during the day.     No dizziness, palpitations, or dizziness with working out.       Medication Followup of adderall    Taking Medication as prescribed: yes    Side Effects:  None    Medication Helping Symptoms:  yes     Just started school, school is going well.   Works at school.   Since starting adderall and discontinuing metadate CD and ritalin, side effects and adverse symptoms has improved a lot, but he reports that \"focus is not there yet\".  He is taking adderall 5mg every night.   He reports that " appetite changes is the same.   He has also not noticed a difference to insomnia and is the same.   Heart rate and anxiety has improved now.     He reports that he experiences symptoms in the AM in first 2 hours. He voices that when he takes adderall, he experiences increase heart rate a little.     He currently has classes from 8am-11am. He takes adderall right away when he wakes up.     He stated that he take the adderall Immediate release at night for better focus at night and has work from 6pm-9pm     Next semester has couple of night classes.     adderall is better than retain and metadate CD and ritalin with respect to side effects.        Problem list and histories reviewed & adjusted, as indicated.  Additional history: as documented    Patient Active Problem List   Diagnosis     Exercise-induced asthma     Attention deficit hyperactivity disorder (ADHD), predominantly inattentive type     History reviewed. No pertinent surgical history.    Social History     Tobacco Use     Smoking status: Never Smoker     Smokeless tobacco: Never Used   Substance Use Topics     Alcohol use: No     Family History   Problem Relation Age of Onset     Unknown/Adopted Mother      Unknown/Adopted Father      Unknown/Adopted Maternal Grandmother      Unknown/Adopted Maternal Grandfather      Unknown/Adopted Paternal Grandmother      Unknown/Adopted Paternal Grandfather          Current Outpatient Medications   Medication Sig Dispense Refill     albuterol (VENTOLIN HFA) 108 (90 Base) MCG/ACT inhaler INHALE 2 PUFFS INTO THE LUNGS EVERY 6 HOURS AS NEEDED FOR SHORTNESS OF BREATH/DYSPNEA OR WHEEZING 18 g 1     amphetamine-dextroamphetamine (ADDERALL XR) 20 MG 24 hr capsule Take 1 capsule (20 mg) by mouth daily 30 capsule 0     [START ON 3/10/2019] amphetamine-dextroamphetamine (ADDERALL XR) 30 MG 24 hr capsule Take 1 capsule (30 mg) by mouth daily 30 capsule 0     amphetamine-dextroamphetamine (ADDERALL) 5 MG tablet Take 1 tablet (5  "mg) by mouth daily In the afternoon 30 tablet 0     No Known Allergies  Recent Labs   Lab Test 05/22/18  1333 08/22/16   ALT  --  30   CR  --  1.01   GFRESTIMATED  --  >90   GFRESTBLACK  --  >90   POTASSIUM  --  4.5   TSH 1.23 0.796      BP Readings from Last 3 Encounters:   01/07/19 112/68   12/10/18 114/70   08/24/18 120/68    Wt Readings from Last 3 Encounters:   01/07/19 78 kg (172 lb)   12/10/18 76.7 kg (169 lb)   08/24/18 72.6 kg (160 lb)                  Labs reviewed in EPIC    Reviewed and updated as needed this visit by clinical staff  Tobacco  Allergies  Meds  Med Hx  Surg Hx  Fam Hx  Soc Hx      Reviewed and updated as needed this visit by Provider         ROS:  Constitutional, HEENT, cardiovascular, pulmonary, GI, , musculoskeletal, neuro, skin, endocrine and psych systems are negative, except as otherwise noted.    OBJECTIVE:     /68   Pulse 72   Temp 98.1  F (36.7  C) (Oral)   Ht 1.88 m (6' 2\")   Wt 78 kg (172 lb)   BMI 22.08 kg/m    Body mass index is 22.08 kg/m .  GENERAL: healthy, alert and no distress  EYES: Eyes grossly normal to inspection, PERRL and conjunctivae and sclerae normal  NECK: no adenopathy, no asymmetry, masses, or scars and thyroid normal to palpation  RESP: lungs clear to auscultation - no rales, rhonchi or wheezes  CV: regular rate and rhythm, normal S1 S2, no S3 or S4, no murmur, click or rub, no peripheral edema and peripheral pulses strong  ABDOMEN: soft, nontender, no hepatosplenomegaly, no masses and bowel sounds normal  MS: no gross musculoskeletal defects noted, no edema  NEURO: Normal strength and tone, mentation intact and speech normal  PSYCH: mentation appears normal, affect normal/bright    Diagnostic Test Results:  none     ASSESSMENT/PLAN:           Tobacco Cessation:   reports that  has never smoked. he has never used smokeless tobacco.      BMI:   Estimated body mass index is 22.08 kg/m  as calculated from the following:    Height as of this " "encounter: 1.88 m (6' 2\").    Weight as of this encounter: 78 kg (172 lb).           ICD-10-CM    1. Exercise-induced asthma J45.990 albuterol (VENTOLIN HFA) 108 (90 Base) MCG/ACT inhaler   2. Attention deficit hyperactivity disorder (ADHD), predominantly inattentive type F90.0 amphetamine-dextroamphetamine (ADDERALL XR) 30 MG 24 hr capsule     amphetamine-dextroamphetamine (ADDERALL) 5 MG tablet     Asthma is currently not controlled, patient voices that he is not worried about this and attributes this to diet changes. He stated that he has noticed asthma changes when he changed he changed his diet three weeks ago. For now, I advised close monitoring and follow up as needed.     ADHD- adverse symptoms and side effects improved since taking adderall and discontinuing prior medications that he was taking- Ritalin and Metadate CD. However, he stated that he continues to struggle to focus. 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 He anticipates having night classes next semester.     Follow up here in one months or sooner as needed.     Patient instructions discussed with patient    The information in this document, created by the medical scribe for me, accurately reflects the services I personally performed and the decisions made by me. I have reviewed and approved this document for accuracy prior to leaving the patient care area.  January 7, 2019 7:33 AM     Katt Meredith DO  [facility]    "

## 2019-01-09 ASSESSMENT — ASTHMA QUESTIONNAIRES: ACT_TOTALSCORE: 14

## 2019-02-04 ENCOUNTER — TELEPHONE (OUTPATIENT)
Dept: FAMILY MEDICINE | Facility: CLINIC | Age: 24
End: 2019-02-04

## 2019-02-04 ENCOUNTER — OFFICE VISIT (OUTPATIENT)
Dept: FAMILY MEDICINE | Facility: CLINIC | Age: 24
End: 2019-02-04
Payer: COMMERCIAL

## 2019-02-04 VITALS
BODY MASS INDEX: 23.43 KG/M2 | TEMPERATURE: 97.5 F | SYSTOLIC BLOOD PRESSURE: 124 MMHG | HEIGHT: 72 IN | DIASTOLIC BLOOD PRESSURE: 76 MMHG | WEIGHT: 173 LBS

## 2019-02-04 DIAGNOSIS — F90.0 ATTENTION DEFICIT HYPERACTIVITY DISORDER (ADHD), PREDOMINANTLY INATTENTIVE TYPE: ICD-10-CM

## 2019-02-04 DIAGNOSIS — J06.9 VIRAL UPPER RESPIRATORY TRACT INFECTION: Primary | ICD-10-CM

## 2019-02-04 PROCEDURE — 99214 OFFICE O/P EST MOD 30 MIN: CPT | Performed by: FAMILY MEDICINE

## 2019-02-04 RX ORDER — DEXTROAMPHETAMINE SACCHARATE, AMPHETAMINE ASPARTATE, DEXTROAMPHETAMINE SULFATE AND AMPHETAMINE SULFATE 1.25; 1.25; 1.25; 1.25 MG/1; MG/1; MG/1; MG/1
5 TABLET ORAL DAILY
Qty: 30 TABLET | Refills: 0 | Status: SHIPPED | OUTPATIENT
Start: 2019-02-07 | End: 2020-02-16

## 2019-02-04 RX ORDER — DEXTROAMPHETAMINE SACCHARATE, AMPHETAMINE ASPARTATE MONOHYDRATE, DEXTROAMPHETAMINE SULFATE AND AMPHETAMINE SULFATE 7.5; 7.5; 7.5; 7.5 MG/1; MG/1; MG/1; MG/1
30 CAPSULE, EXTENDED RELEASE ORAL DAILY
Qty: 30 CAPSULE | Refills: 0 | Status: SHIPPED | OUTPATIENT
Start: 2019-02-04 | End: 2019-05-01

## 2019-02-04 RX ORDER — DEXTROAMPHETAMINE SACCHARATE, AMPHETAMINE ASPARTATE MONOHYDRATE, DEXTROAMPHETAMINE SULFATE AND AMPHETAMINE SULFATE 7.5; 7.5; 7.5; 7.5 MG/1; MG/1; MG/1; MG/1
30 CAPSULE, EXTENDED RELEASE ORAL DAILY
Qty: 30 CAPSULE | Refills: 0 | Status: SHIPPED | OUTPATIENT
Start: 2019-02-07 | End: 2019-05-01

## 2019-02-04 RX ORDER — DEXTROAMPHETAMINE SACCHARATE, AMPHETAMINE ASPARTATE MONOHYDRATE, DEXTROAMPHETAMINE SULFATE AND AMPHETAMINE SULFATE 7.5; 7.5; 7.5; 7.5 MG/1; MG/1; MG/1; MG/1
30 CAPSULE, EXTENDED RELEASE ORAL DAILY
Qty: 30 CAPSULE | Refills: 0 | Status: SHIPPED | OUTPATIENT
Start: 2019-04-08 | End: 2019-05-01

## 2019-02-04 ASSESSMENT — MIFFLIN-ST. JEOR: SCORE: 1813.75

## 2019-02-04 NOTE — PROGRESS NOTES
"  SUBJECTIVE:   Patrick Gutierrez is a 23 year old male who presents to clinic today for the following health issues:      Asthma Follow-Up- sport induce    Was ACT completed today?    Yes    ACT Total Scores 2/4/2019   ACT TOTAL SCORE (Goal Greater than or Equal to 20) 20   In the past 12 months, how many times did you visit the emergency room for your asthma without being admitted to the hospital? 0   In the past 12 months, how many times were you hospitalized overnight because of your asthma? 0       Recent asthma triggers that patient is dealing with: exercise or sports        Amount of exercise or physical activity: 4-5 days/week for an average of greater than 60 minutes    Problems taking medications regularly: Yes    Medication side effects: none    Diet: regular (no restrictions)    Medication Followup of  Adderall    Taking Medication as prescribed: yes    Side Effects:  None    Medication Helping Symptoms:  yes     Acute Illness   Acute illness concerns: sick  Onset: since last week    Fever: no     Chills/Sweats: no     Headache (location?): no     Sinus Pressure:YES    Conjunctivitis:  no    Ear Pain: YES: right    Rhinorrhea: YES,     Congestion: YES, both chest/nose    Sore Throat: YES, when he wakes up     Cough: YES, with phlegm     Wheeze: no     Decreased Appetite: no     Nausea: no     Vomiting: no     Diarrhea:  no     Dysuria/Freq.: no     Fatigue/Achiness: no     Sick/Strep Exposure: no      Therapies Tried and outcome: flonase, mucinex, cough drops    Cold Symptoms  Patient has had a cold since last week. He has used a nasal spray, cough drops, and Mucinex. The Mucinex has been \"clearing out his system\". He denies fever, facial pain, headaches.     Asthma   He has started using a new Albuterol inhaler that he uses for before he exercises, it has been working well. He no longer feels short of breath when running or jumping.     ADHD  Patient expresses that he is doing well on his 30 mg Adderall. " His anxiety has reduced recently, he hasn't been experiencing shakiness and his thought process has been clearer. Patient has a job working nights, he typically works 3 nights per week. Patient will typically take a 5 mg Adderall for these shifts. He denies chest pain, or other symptoms related to Adderall.         Problem list and histories reviewed & adjusted, as indicated.  Additional history: as documented    Patient Active Problem List   Diagnosis     Exercise-induced asthma     Attention deficit hyperactivity disorder (ADHD), predominantly inattentive type     No past surgical history on file.    Social History     Tobacco Use     Smoking status: Never Smoker     Smokeless tobacco: Never Used   Substance Use Topics     Alcohol use: No     Family History   Problem Relation Age of Onset     Unknown/Adopted Mother      Unknown/Adopted Father      Unknown/Adopted Maternal Grandmother      Unknown/Adopted Maternal Grandfather      Unknown/Adopted Paternal Grandmother      Unknown/Adopted Paternal Grandfather          Current Outpatient Medications   Medication Sig Dispense Refill     albuterol (VENTOLIN HFA) 108 (90 Base) MCG/ACT inhaler INHALE 2 PUFFS INTO THE LUNGS EVERY 6 HOURS AS NEEDED FOR SHORTNESS OF BREATH/DYSPNEA OR WHEEZING 18 g 1     [START ON 2/7/2019] amphetamine-dextroamphetamine (ADDERALL XR) 30 MG 24 hr capsule Take 1 capsule (30 mg) by mouth daily 30 capsule 0     amphetamine-dextroamphetamine (ADDERALL XR) 30 MG 24 hr capsule Take 1 capsule (30 mg) by mouth daily 30 capsule 0     [START ON 4/8/2019] amphetamine-dextroamphetamine (ADDERALL XR) 30 MG 24 hr capsule Take 1 capsule (30 mg) by mouth daily 30 capsule 0     [START ON 2/7/2019] amphetamine-dextroamphetamine (ADDERALL) 5 MG tablet Take 1 tablet (5 mg) by mouth daily In the afternoon 30 tablet 0     No Known Allergies  Recent Labs   Lab Test 05/22/18  1333 08/22/16   ALT  --  30   CR  --  1.01   GFRESTIMATED  --  >90   GFRESTBLACK  --  >90  "  POTASSIUM  --  4.5   TSH 1.23 0.796      BP Readings from Last 3 Encounters:   02/04/19 124/76   01/07/19 112/68   12/10/18 114/70    Wt Readings from Last 3 Encounters:   02/04/19 78.5 kg (173 lb)   01/07/19 78 kg (172 lb)   12/10/18 76.7 kg (169 lb)                  Labs reviewed in EPIC    Reviewed and updated as needed this visit by clinical staff  Tobacco  Allergies  Meds       Reviewed and updated as needed this visit by Provider         ROS:  Constitutional, HEENT, cardiovascular, pulmonary, GI, , musculoskeletal, neuro, skin, endocrine and psych systems are negative, except as otherwise noted.    This document serves as a record of the services and decisions personally performed and made by Katt Meredith DO. It was created on her behalf by Mk Mccray, trained medical scribe. The creation of this document is based on the provider's statements to the medical scribe.  Mk Mccray 7:04 AM February 4, 2019    OBJECTIVE:     /76 (BP Location: Right arm, Patient Position: Sitting, Cuff Size: Adult Regular)   Temp 97.5  F (36.4  C) (Oral)   Ht 1.822 m (5' 11.75\")   Wt 78.5 kg (173 lb)   BMI 23.63 kg/m    Body mass index is 23.63 kg/m .  GENERAL: healthy, alert and no distress  HENT: ear canals and TM's normal, nose and mouth without ulcers or lesions, inflamed inferior turbinates and posterior oropharynx with clear drainage and mild erythema   RESP: lungs clear to auscultation - no rales, rhonchi or wheezes  CV: regular rate and rhythm, normal S1 S2, no S3 or S4, no murmur, click or rub, no peripheral edema and peripheral pulses strong  PSYCH: mentation appears normal, affect normal/bright    Diagnostic Test Results:  none     ASSESSMENT/PLAN:     1. Attention deficit hyperactivity disorder (ADHD), predominantly inattentive type  Stable, controlled by current medication. Patient typically takes 30 mg of Adderall daily. He has a job working 3 nights per week and will take a 5 mg " Adderall for these shifts, approximately 12 shifts per month. He is not experiencing any side effects.  - amphetamine-dextroamphetamine (ADDERALL XR) 30 MG 24 hr capsule; Take 1 capsule (30 mg) by mouth daily  Dispense: 30 capsule; Refill: 0  - amphetamine-dextroamphetamine (ADDERALL XR) 30 MG 24 hr capsule; Take 1 capsule (30 mg) by mouth daily  Dispense: 30 capsule; Refill: 0  - amphetamine-dextroamphetamine (ADDERALL XR) 30 MG 24 hr capsule; Take 1 capsule (30 mg) by mouth daily  Dispense: 30 capsule; Refill: 0  - amphetamine-dextroamphetamine (ADDERALL) 5 MG tablet; Take 1 tablet (5 mg) by mouth daily In the afternoon  Dispense: 30 tablet; Refill: 0    adhd-stable on current meds, using adderall 5mg infrequently, 3 days a week so 12 a month, refills ok until may.  Continue 30mg long acting for now    Asthma-stable    UPPER RESPIRATORY INFECTION-likely viral, improving    Patient Instructions     Contact me if you spike a fever or if your symptoms worsen.    Schedule a follow up in May before you finish school.  I will walk over your 3 months script and then you call after that  Katt Meredith D.O.      [facility]   Discharged by : Michael Henson CMA on 2/4/2019 at 7:09 AM    Paper scripts provided to patient :      If you have any questions regarding your visit please contact your care team:     Team Gold                Clinic Hours Telephone Number     Dr. Jesenia Harris, CNP 7am-7pm  Monday - Thursday   7am-5pm  Fridays  [phone]   (Appointment scheduling available 24/7)     RN Line  [phone] option 2     Urgent Care - [place] - 11am-9pm Monday-Friday Saturday-Sunday- 9am-5pm     [place] -   5pm-9pm Monday-Friday Saturday-Sunday- 9am-5pm    [phone] - [place]    [phone] - [place]     For a Price Quote for your services, please call our Consumer Price Line at  [phone].     What options do I have for visits at the clinic other than the traditional office visit?     To expand how we care for you, many of our providers are utilizing electronic visits (e-visits) and telephone visits, when medically appropriate, for interactions with their patients rather than a visit in the clinic. We also offer nurse visits for many medical concerns. Just like any other service, we will bill your insurance company for this type of visit based on time spent on the phone with your provider. Not all insurance companies cover these visits. Please check with your medical insurance if this type of visit is covered. You will be responsible for any charges that are not paid by your insurance.   E-visits via Sellplex: generally incur a $35.00 fee.     Telephone visits:  Time spent on the phone: *charged based on time that is spent on the phone in increments of 10 minutes. Estimated cost:   5-10 mins $30.00   11-20 mins. $59.00   21-30 mins. $85.00     Use Sellplex (secure email communication and access to your chart) to send your primary care provider a message or make an appointment. Ask someone on your Team how to sign up for Sellplex.     As always, Thank you for trusting us with your health care needs!    [facility]:    Scheduling Appointments  [place]  Call: [phone]    [facility]  Call: [phone]    [facility]  Call: [phone]    For Gastroenterology referrals   [facility]   Clinics and Surgery Center, 4th Floor   [address] [place] [id number]   Appointments: [phone]    WHERE TO GO FOR CARE?  Clinic    Make an appointment if you:       Are sick (cold, cough, flu, sore throat, earache or in pain).       Have a small injury (sprain, small cut, burn or broken bone).       Need a physical exam, Pap smear, vaccine or prescription refill.       Have questions  about your health or medicines.    To reach us:      Call [phone] ([phone]). Open 24 hours every day. (For counseling services, call [phone].)    Log into Greencart at uma information technology. (Visit [email or web address] to create an account.) Hospital emergency room    An emergency is a serious or life- threatening problem that must be treated right away.    Call 911 or get to the hospital if you have:      Very bad or sudden:            - Chest pain or pressure         - Bleeding         - Head or belly pain         - Dizziness or trouble seeing, walking or                          Speaking      Problems breathing      Blood in your vomit or you are coughing up blood      A major injury (knocked out, loss of a finger or limb, rape, broken bone protruding from skin)    A mental health crisis. (Or call the Mental Health Crisis line at 1-864.234.9624 or Suicide Prevention Hotline at 1-174.661.9292.)    Open 24 hours every day. You don't need an appointment.     Urgent care    Visit urgent care for sickness or small injuries when the clinic is closed. You don't need an appointment. To check hours or find an urgent care near you, visit [email or web address]. Online care    Get online care from [facility] for more than 70 common problems, like colds, allergies and infections. Open 24 hours every day at:   [email or web address]   Need help deciding?    For advice about where to be seen, you may call your clinic and ask to speak with a nurse. We're here for you 24 hours every day.         If you are deaf or hard of hearing, please let us know. We provide many free services including sign language interpreters, oral interpreters, TTYs, telephone amplifiers, note takers and written materials.             The information in this document, created by the medical scribe for me, accurately reflects the services I personally performed and the decisions made by me. I have reviewed and approved this document for accuracy prior to  leaving the patient care area.  February 4, 2019 7:06 AM    Katt Meredith DO  [facility]

## 2019-02-04 NOTE — PATIENT INSTRUCTIONS
Contact me if you spike a fever or if your symptoms worsen.    Schedule a follow up in May before you finish school.  I will walk over your 3 months script and then you call after that  Katt Meredith D.O.      [facility]   Discharged by : Michael Henson CMA on 2/4/2019 at 7:09 AM    Paper scripts provided to patient :      If you have any questions regarding your visit please contact your care team:     Team Gold                Clinic Hours Telephone Number     Dr. Jesenia Harris, CNP 7am-7pm  Monday - Thursday   7am-5pm  Fridays  [phone]   (Appointment scheduling available 24/7)     RN Line  [phone] option 2     Urgent Care - [place] - 11am-9pm Monday-Friday Saturday-Sunday- 9am-5pm     [place] -   5pm-9pm Monday-Friday Saturday-Sunday- 9am-5pm    [phone] - [place]    [phone] - [place]     For a Price Quote for your services, please call our Consumer Price Line at [phone].     What options do I have for visits at the clinic other than the traditional office visit?     To expand how we care for you, many of our providers are utilizing electronic visits (e-visits) and telephone visits, when medically appropriate, for interactions with their patients rather than a visit in the clinic. We also offer nurse visits for many medical concerns. Just like any other service, we will bill your insurance company for this type of visit based on time spent on the phone with your provider. Not all insurance companies cover these visits. Please check with your medical insurance if this type of visit is covered. You will be responsible for any charges that are not paid by your insurance.   E-visits via One Source Networks: generally incur a $35.00 fee.     Telephone visits:  Time spent on the phone: *charged based on time that is spent on the phone in increments of 10 minutes. Estimated cost:    5-10 mins $30.00   11-20 mins. $59.00   21-30 mins. $85.00     Use HomeMe.rut (secure email communication and access to your chart) to send your primary care provider a message or make an appointment. Ask someone on your Team how to sign up for Made2Manage Systems.     As always, Thank you for trusting us with your health care needs!    [facility]:    Scheduling Appointments  [facility]  Call: [phone]    [facility]  Call: [phone]    [facility]  Call: [phone]    For Gastroenterology referrals   [facility]enter[facility]   Clinics and Surgery Center, 4th Floor   [address] [place] [address]   Appointments: [phone]    WHERE TO GO FOR CARE?  Clinic    Make an appointment if you:       Are sick (cold, cough, flu, sore throat, earache or in pain).       Have a small injury (sprain, small cut, burn or broken bone).       Need a physical exam, Pap smear, vaccine or prescription refill.       Have questions about your health or medicines.    To reach us:      Call [phone] ([phone]). Open 24 hours every day. (For counseling services, call [phone].)    Log into Made2Manage Systems at [email or web address]. (Visit [email or web address] to create an account.) Hospital emergency room    An emergency is a serious or life- threatening problem that must be treated right away.    Call [phone] or get to the hospital if you have:      Very bad or sudden:            - Chest pain or pressure         - Bleeding         - Head or belly pain         - Dizziness or trouble seeing, walking or                          Speaking      Problems breathing      Blood in your vomit or you are coughing up blood      A major injury (knocked out, loss of a finger or limb, rape, broken bone protruding from skin)    A mental health crisis. (Or call the Mental Health Crisis line at 1-187.205.6798 or Suicide Prevention Hotline at  2-710-026-5841.)    Open 24 hours every day. You don't need an appointment.     Urgent care    Visit urgent care for sickness or small injuries when the clinic is closed. You don't need an appointment. To check hours or find an urgent care near you, visit [email or web address]. Online care    Get online care from [facility] for more than 70 common problems, like colds, allergies and infections. Open 24 hours every day at:   [email or web address]   Need help deciding?    For advice about where to be seen, you may call your clinic and ask to speak with a nurse. We're here for you 24 hours every day.         If you are deaf or hard of hearing, please let us know. We provide many free services including sign language interpreters, oral interpreters, TTYs, telephone amplifiers, note takers and written materials.

## 2019-02-05 ASSESSMENT — ASTHMA QUESTIONNAIRES: ACT_TOTALSCORE: 20

## 2019-04-08 ENCOUNTER — TELEPHONE (OUTPATIENT)
Dept: FAMILY MEDICINE | Facility: CLINIC | Age: 24
End: 2019-04-08

## 2019-04-08 NOTE — TELEPHONE ENCOUNTER
Reason for Call:  Other prescription    Detailed comments: patient needs prescriptions for amphetamine-dextroamphetamine (ADDERALL XR) 3 months of prescriptions. Please take to pharmacy at the clinic     Phone Number Patient can be reached at: Home number on file [phone] (home)    Best Time: any    Can we leave a detailed message on this number? YES    Call taken on 4/8/2019 at 7:16 AM by Dora Ignacio

## 2019-04-08 NOTE — TELEPHONE ENCOUNTER
Returned call to patient, as he should have a script dated today to get him through the next month, and per visit note from PCP on 2/4/19, he is due for a visit.  He does have a visit scheduled for 5/1/19 and should have medication to get to then, so left a detailed VM to f/u with PCP in clinic as scheduled.    Siri Sparks RN

## 2019-05-01 ENCOUNTER — OFFICE VISIT (OUTPATIENT)
Dept: FAMILY MEDICINE | Facility: CLINIC | Age: 24
End: 2019-05-01
Payer: COMMERCIAL

## 2019-05-01 VITALS
SYSTOLIC BLOOD PRESSURE: 132 MMHG | BODY MASS INDEX: 20.99 KG/M2 | HEIGHT: 72 IN | WEIGHT: 155 LBS | HEART RATE: 67 BPM | DIASTOLIC BLOOD PRESSURE: 85 MMHG

## 2019-05-01 DIAGNOSIS — F90.0 ATTENTION DEFICIT HYPERACTIVITY DISORDER (ADHD), PREDOMINANTLY INATTENTIVE TYPE: Primary | ICD-10-CM

## 2019-05-01 DIAGNOSIS — M92.523 OSGOOD-SCHLATTER'S DISEASE OF BOTH KNEES: ICD-10-CM

## 2019-05-01 DIAGNOSIS — M25.561 RIGHT KNEE PAIN, UNSPECIFIED CHRONICITY: ICD-10-CM

## 2019-05-01 PROCEDURE — 99214 OFFICE O/P EST MOD 30 MIN: CPT | Performed by: FAMILY MEDICINE

## 2019-05-01 RX ORDER — DEXTROAMPHETAMINE SACCHARATE, AMPHETAMINE ASPARTATE MONOHYDRATE, DEXTROAMPHETAMINE SULFATE AND AMPHETAMINE SULFATE 7.5; 7.5; 7.5; 7.5 MG/1; MG/1; MG/1; MG/1
30 CAPSULE, EXTENDED RELEASE ORAL DAILY
Qty: 30 CAPSULE | Refills: 0 | Status: SHIPPED | OUTPATIENT
Start: 2019-07-08 | End: 2019-07-08

## 2019-05-01 RX ORDER — DEXTROAMPHETAMINE SACCHARATE, AMPHETAMINE ASPARTATE, DEXTROAMPHETAMINE SULFATE AND AMPHETAMINE SULFATE 7.5; 7.5; 7.5; 7.5 MG/1; MG/1; MG/1; MG/1
30 TABLET ORAL 2 TIMES DAILY
Qty: 30 TABLET | Refills: 0 | Status: SHIPPED | OUTPATIENT
Start: 2019-08-08 | End: 2019-05-01

## 2019-05-01 RX ORDER — DEXTROAMPHETAMINE SACCHARATE, AMPHETAMINE ASPARTATE MONOHYDRATE, DEXTROAMPHETAMINE SULFATE AND AMPHETAMINE SULFATE 7.5; 7.5; 7.5; 7.5 MG/1; MG/1; MG/1; MG/1
30 CAPSULE, EXTENDED RELEASE ORAL DAILY
Qty: 30 CAPSULE | Refills: 0 | Status: SHIPPED | OUTPATIENT
Start: 2019-08-08 | End: 2019-08-16

## 2019-05-01 RX ORDER — DEXTROAMPHETAMINE SACCHARATE, AMPHETAMINE ASPARTATE MONOHYDRATE, DEXTROAMPHETAMINE SULFATE AND AMPHETAMINE SULFATE 7.5; 7.5; 7.5; 7.5 MG/1; MG/1; MG/1; MG/1
30 CAPSULE, EXTENDED RELEASE ORAL DAILY
Qty: 30 CAPSULE | Refills: 0 | Status: SHIPPED | OUTPATIENT
Start: 2019-06-07 | End: 2019-08-15

## 2019-05-01 RX ORDER — DEXTROAMPHETAMINE SACCHARATE, AMPHETAMINE ASPARTATE MONOHYDRATE, DEXTROAMPHETAMINE SULFATE AND AMPHETAMINE SULFATE 7.5; 7.5; 7.5; 7.5 MG/1; MG/1; MG/1; MG/1
30 CAPSULE, EXTENDED RELEASE ORAL DAILY
Qty: 30 CAPSULE | Refills: 0 | Status: SHIPPED | OUTPATIENT
Start: 2019-05-01 | End: 2019-08-15

## 2019-05-01 ASSESSMENT — MIFFLIN-ST. JEOR: SCORE: 1727.11

## 2019-05-01 NOTE — PROGRESS NOTES
"  SUBJECTIVE:   Patrick Gutierrez is a 24 year old male who presents to clinic today for the following health issues:      HPI     Medication Followup of  ADHD    Taking Medication as prescribed: yes    Side Effects:  None    Medication Helping Symptoms:  yes       Working and going to school  One more year   Physical education forcusing  No issues with appetite physical actiivr  He is trying to lose wieght   He is running , one more meet  Running and jumping    No issues with sleep  No palpitations no sob no chest pain      History of osgood schlatter's   Now having persisting pain    Additional history: as documented    Reviewed and updated as needed this visit by clinical staff  Tobacco  Allergies  Meds         Reviewed and updated as needed this visit by Provider             Patient Active Problem List   Diagnosis     Exercise-induced asthma     Attention deficit hyperactivity disorder (ADHD), predominantly inattentive type     No past surgical history on file.    Social History     Tobacco Use     Smoking status: Never Smoker     Smokeless tobacco: Never Used   Substance Use Topics     Alcohol use: No     Family History   Problem Relation Age of Onset     Unknown/Adopted Mother      Unknown/Adopted Father      Unknown/Adopted Maternal Grandmother      Unknown/Adopted Maternal Grandfather      Unknown/Adopted Paternal Grandmother      Unknown/Adopted Paternal Grandfather            ROS:  Constitutional, HEENT, cardiovascular, pulmonary, GI, , musculoskeletal, neuro, skin, endocrine and psych systems are negative, except as otherwise noted.    OBJECTIVE:     /85 (Patient Position: Sitting, Cuff Size: Adult Regular)   Pulse 67   Ht 1.822 m (5' 11.75\")   Wt 70.3 kg (155 lb)   BMI 21.17 kg/m    Body mass index is 21.17 kg/m .   GENERAL: healthy, alert and no distress  EYES: Eyes grossly normal to inspection, PERRL and conjunctivae and sclerae normal  HENT: ear canals and TM's normal, nose and mouth " without ulcers or lesions  NECK: no adenopathy, no asymmetry, masses, or scars and thyroid normal to palpation  RESP: lungs clear to auscultation - no rales, rhonchi or wheezes  CV: regular rate and rhythm, normal S1 S2, no S3 or S4, no murmur, click or rub, no peripheral edema and peripheral pulses strong  ABDOMEN: soft, nontender, no hepatosplenomegaly, no masses and bowel sounds normal  MS: osgood schlatter prominence, tender to touch,  no gross musculoskeletal defects noted, no edema  SKIN: no suspicious lesions or rashes  NEURO: Normal strength and tone, mentation intact and speech normal  PSYCH: mentation appears normal, affect normal/bright    Diagnostic Test Results:  none     ASSESSMENT:       ICD-10-CM    1. Attention deficit hyperactivity disorder (ADHD), predominantly inattentive type F90.0 amphetamine-dextroamphetamine (ADDERALL XR) 30 MG 24 hr capsule     amphetamine-dextroamphetamine (ADDERALL XR) 30 MG 24 hr capsule     amphetamine-dextroamphetamine (ADDERALL XR) 30 MG 24 hr capsule     amphetamine-dextroamphetamine (ADDERALL XR) 30 MG 24 hr capsule     DISCONTINUED: amphetamine-dextroamphetamine (ADDERALL) 30 MG tablet   2. Right knee pain, unspecified chronicity M25.561    3. Osgood-Schlatter's disease of both knees M92.51     M92.52        Advised taking meds as planned  Get scripts until august  Go to sports med if knee pain persisting  Get the tibial band if you can for use of exercise    Follow up in 6 months    See Patient Instructions    Katt Meredith DO  [facility]

## 2019-05-01 NOTE — PATIENT INSTRUCTIONS
Advised taking meds as planned  Get scripts until august  Go to sports med if knee pain persisting  Get the tibial band if you can for use of exercise  Katt Meredith D.O.

## 2019-07-08 DIAGNOSIS — F90.0 ATTENTION DEFICIT HYPERACTIVITY DISORDER (ADHD), PREDOMINANTLY INATTENTIVE TYPE: ICD-10-CM

## 2019-07-08 NOTE — TELEPHONE ENCOUNTER
Reason for Call:  Medication or medication refill:    Do you use a [facility]?  Name of the pharmacy and phone number for the current request:  [facility]    Name of the medication requested: amphetamine-dextroamphetamine (ADDERALL XR) 30 MG 24 hr capsule    Other request: Patient states he submitted prescription for July to [facility] location in May anticipating he would be in the city for work.  He is no longer in [place] and would like to fill July prescription at [facility] pharmacy.  [facility] informed patient they are unable to transfer prescription due to controlled substance.      Patient needs July prescription only to be filled at [facility] pharmacy.  Please call patient when process complete.     Can we leave a detailed message on this number? YES    Phone number patient can be reached at: Home number on file [phone] (home)    Best Time: any    Call taken on 7/8/2019 at 1:23 PM by Mae Trammell

## 2019-07-09 NOTE — TELEPHONE ENCOUNTER
We need confirmation from the [facility] pharmacy that they have a hard copy on file for this or have records of having had it.     Ok to route back to provider pool once we have more info.     Thanks.  Marvin Phillips

## 2019-07-09 NOTE — TELEPHONE ENCOUNTER
amphetamine-dextroamphetamine (ADDERALL XR) 30 MG 24 hr capsule 30 capsule 0 8/8/2019  --   Sig - Route: Take 1 capsule (30 mg) by mouth daily - Oral   Class: Local Print   Earliest Fill Date: 8/8/2019   Order: [id number]       Last Office Visit: 5/1/2019  Future Office visit:       Routing refill request to provider for review/approval because:  Drug not on the [facility], [facility] or [facility] refill protocol or controlled substance

## 2019-07-09 NOTE — TELEPHONE ENCOUNTER
Routing medication request to provider pool to please advise. Order pended.     Patient took July prescription to pharmacy in [place] as he thought he would be working there in July. Patient had a change of plans and will no longer be in [place]. Patient is now unable to transfer the prescription to the [place] pharmacy.      Treasure Hopper RN

## 2019-07-11 NOTE — TELEPHONE ENCOUNTER
Reached out to patient and left detailed voicemail requesting a call back with the pharmacy information to confirm that they do have the patient's prescription.     Treasure Hopper RN

## 2019-07-11 NOTE — TELEPHONE ENCOUNTER
Reason for Call:  Other call back    Detailed comments: Ai from [facility] in [place] called and stated PT just called at 6:45pm.  Pharmacy stated they do have the hard copy for the month of July and would like to know if they should transfer or what the procedure to follow is.    Phone Number Patient can be reached at: Other phone number:  [phone]    Best Time: Anytime but Ai thought the team needed to speak with them directly versus leaving a voicemail.    Can we leave a detailed message on this number? No    Call taken on 7/11/2019 at 6:52 PM by Danii Rodriguez

## 2019-07-12 RX ORDER — DEXTROAMPHETAMINE SACCHARATE, AMPHETAMINE ASPARTATE MONOHYDRATE, DEXTROAMPHETAMINE SULFATE AND AMPHETAMINE SULFATE 7.5; 7.5; 7.5; 7.5 MG/1; MG/1; MG/1; MG/1
30 CAPSULE, EXTENDED RELEASE ORAL DAILY
Qty: 30 CAPSULE | Refills: 0 | Status: SHIPPED | OUTPATIENT
Start: 2019-07-12 | End: 2019-07-12

## 2019-07-12 RX ORDER — DEXTROAMPHETAMINE SACCHARATE, AMPHETAMINE ASPARTATE MONOHYDRATE, DEXTROAMPHETAMINE SULFATE AND AMPHETAMINE SULFATE 7.5; 7.5; 7.5; 7.5 MG/1; MG/1; MG/1; MG/1
30 CAPSULE, EXTENDED RELEASE ORAL DAILY
Qty: 30 CAPSULE | Refills: 0 | Status: SHIPPED | OUTPATIENT
Start: 2019-07-12 | End: 2019-08-15

## 2019-07-12 NOTE — TELEPHONE ENCOUNTER
[facility] pharmacy states that they do have the hard copy for patient's July prescription for Adderall.   I can call and have them destroy it. Do you want to write a new prescription for the patient? He would use our pharmacy.  Thank you,  Jagdish Carlos RN

## 2019-07-12 NOTE — TELEPHONE ENCOUNTER
New script printed, placed in outbox and notify pt to  script.  Please call and have [facility] destroy their copy.    Thanks  Lynnette William PA-C

## 2019-07-15 NOTE — TELEPHONE ENCOUNTER
An Item was picked up at the [facility] :    Date it was picked up?  07/15/2019    What was picked up?  Envelope scripts     Who picked them up?  Patrick Gutierrez    Relationship to patient? Patient     Did they show ID?  Yes    Was it logged in book? Yes    Who gave it to the patient?    Maggy Brandon- Patient Representative

## 2019-08-13 DIAGNOSIS — F90.0 ATTENTION DEFICIT HYPERACTIVITY DISORDER (ADHD), PREDOMINANTLY INATTENTIVE TYPE: ICD-10-CM

## 2019-08-13 NOTE — TELEPHONE ENCOUNTER
amphetamine-dextroamphetamine (ADDERALL XR) 30 MG 24 hr capsule      Last Written Prescription Date:  8/8/2019  Last Fill Quantity: 30 capsule,   # refills: 0  Last Office Visit: 5/1/2019 w/ SUSAN Meredith    Future Office visit:       Routing refill request to provider for review/approval because:  Drug not on the [facility], P or [facility] refill protocol or controlled substance

## 2019-08-13 NOTE — TELEPHONE ENCOUNTER
Reason for Call:  Medication or medication refill:    Do you use a [facility]?  Name of the pharmacy and phone number for the current request:  [facility] [place]    Name of the medication requested: ADDERALL XR 30 MG times 3 month supply    Other request: Pt would like to know if scripts can be walked donna to pharmacy.    Can we leave a detailed message on this number? NO    Phone number patient can be reached at: Cell number on file:    Telephone Information:   Mobile [phone]       Best Time: Anytime    Call taken on 8/13/2019 at 11:14 AM by Alba Yanez

## 2019-08-15 RX ORDER — DEXTROAMPHETAMINE SACCHARATE, AMPHETAMINE ASPARTATE MONOHYDRATE, DEXTROAMPHETAMINE SULFATE AND AMPHETAMINE SULFATE 7.5; 7.5; 7.5; 7.5 MG/1; MG/1; MG/1; MG/1
30 CAPSULE, EXTENDED RELEASE ORAL DAILY
Qty: 30 CAPSULE | Refills: 0 | Status: SHIPPED | OUTPATIENT
Start: 2019-11-07 | End: 2019-11-05

## 2019-08-15 RX ORDER — DEXTROAMPHETAMINE SACCHARATE, AMPHETAMINE ASPARTATE MONOHYDRATE, DEXTROAMPHETAMINE SULFATE AND AMPHETAMINE SULFATE 7.5; 7.5; 7.5; 7.5 MG/1; MG/1; MG/1; MG/1
30 CAPSULE, EXTENDED RELEASE ORAL DAILY
Qty: 30 CAPSULE | Refills: 0 | Status: SHIPPED | OUTPATIENT
Start: 2019-09-06 | End: 2019-11-05

## 2019-08-15 RX ORDER — DEXTROAMPHETAMINE SACCHARATE, AMPHETAMINE ASPARTATE MONOHYDRATE, DEXTROAMPHETAMINE SULFATE AND AMPHETAMINE SULFATE 7.5; 7.5; 7.5; 7.5 MG/1; MG/1; MG/1; MG/1
30 CAPSULE, EXTENDED RELEASE ORAL DAILY
Qty: 30 CAPSULE | Refills: 0 | Status: SHIPPED | OUTPATIENT
Start: 2019-10-07 | End: 2021-04-14

## 2019-08-16 RX ORDER — DEXTROAMPHETAMINE SACCHARATE, AMPHETAMINE ASPARTATE MONOHYDRATE, DEXTROAMPHETAMINE SULFATE AND AMPHETAMINE SULFATE 7.5; 7.5; 7.5; 7.5 MG/1; MG/1; MG/1; MG/1
30 CAPSULE, EXTENDED RELEASE ORAL DAILY
Qty: 30 CAPSULE | Refills: 0 | Status: SHIPPED | OUTPATIENT
Start: 2019-08-16 | End: 2019-11-05

## 2019-08-16 NOTE — TELEPHONE ENCOUNTER
3 months from last date printed  Needs a follow up after these refills  Will sign in the am and out in box  Katt Meredith D.O.

## 2019-08-19 NOTE — TELEPHONE ENCOUNTER
Routing to PCP.   Called patient to schedule an appointment.  Patient wants me to double check with Dr Meredith to see exactly when he needs to schedule with her.  He was told by someone that he was good through October.  Please advise when patient should be seen??    Dana Victor

## 2019-08-20 NOTE — TELEPHONE ENCOUNTER
Please see previous message, this was taken care of already, script till nov filled, needs an appoint in nov   Katt Meredith D.O.

## 2019-08-23 NOTE — TELEPHONE ENCOUNTER
Called patient back and let him know that Dr Meredith said to come to see her in Nov.    Dana Victor

## 2019-11-05 ENCOUNTER — OFFICE VISIT (OUTPATIENT)
Dept: FAMILY MEDICINE | Facility: CLINIC | Age: 24
End: 2019-11-05
Payer: COMMERCIAL

## 2019-11-05 VITALS
HEIGHT: 72 IN | SYSTOLIC BLOOD PRESSURE: 119 MMHG | HEART RATE: 60 BPM | DIASTOLIC BLOOD PRESSURE: 82 MMHG | WEIGHT: 150 LBS | TEMPERATURE: 97.6 F | BODY MASS INDEX: 20.32 KG/M2

## 2019-11-05 DIAGNOSIS — G47.01 INSOMNIA DUE TO MEDICAL CONDITION: ICD-10-CM

## 2019-11-05 DIAGNOSIS — K59.01 SLOW TRANSIT CONSTIPATION: ICD-10-CM

## 2019-11-05 DIAGNOSIS — Z00.00 ROUTINE GENERAL MEDICAL EXAMINATION AT A HEALTH CARE FACILITY: Primary | ICD-10-CM

## 2019-11-05 DIAGNOSIS — Z23 NEED FOR PROPHYLACTIC VACCINATION AND INOCULATION AGAINST INFLUENZA: ICD-10-CM

## 2019-11-05 DIAGNOSIS — F90.0 ATTENTION DEFICIT HYPERACTIVITY DISORDER (ADHD), PREDOMINANTLY INATTENTIVE TYPE: ICD-10-CM

## 2019-11-05 DIAGNOSIS — Z13.1 SCREENING FOR DIABETES MELLITUS: ICD-10-CM

## 2019-11-05 DIAGNOSIS — J45.990 EXERCISE-INDUCED ASTHMA: ICD-10-CM

## 2019-11-05 DIAGNOSIS — Z13.220 LIPID SCREENING: ICD-10-CM

## 2019-11-05 DIAGNOSIS — Z11.3 SCREEN FOR STD (SEXUALLY TRANSMITTED DISEASE): ICD-10-CM

## 2019-11-05 DIAGNOSIS — Z02.82 ADOPTED PERSON: ICD-10-CM

## 2019-11-05 LAB
ANION GAP SERPL CALCULATED.3IONS-SCNC: 8 MMOL/L (ref 3–14)
BUN SERPL-MCNC: 12 MG/DL (ref 7–30)
CALCIUM SERPL-MCNC: 9.5 MG/DL (ref 8.5–10.1)
CHLORIDE SERPL-SCNC: 106 MMOL/L (ref 94–109)
CHOLEST SERPL-MCNC: 194 MG/DL
CO2 SERPL-SCNC: 25 MMOL/L (ref 20–32)
CREAT SERPL-MCNC: 1.08 MG/DL (ref 0.66–1.25)
GFR SERPL CREATININE-BSD FRML MDRD: >90 ML/MIN/{1.73_M2}
GLUCOSE SERPL-MCNC: 84 MG/DL (ref 70–99)
HDLC SERPL-MCNC: 75 MG/DL
HGB BLD-MCNC: 15.7 G/DL (ref 13.3–17.7)
LDLC SERPL CALC-MCNC: 105 MG/DL
NONHDLC SERPL-MCNC: 119 MG/DL
POTASSIUM SERPL-SCNC: 3.6 MMOL/L (ref 3.4–5.3)
SODIUM SERPL-SCNC: 139 MMOL/L (ref 133–144)
TRIGL SERPL-MCNC: 69 MG/DL

## 2019-11-05 PROCEDURE — 90471 IMMUNIZATION ADMIN: CPT | Performed by: FAMILY MEDICINE

## 2019-11-05 PROCEDURE — 80061 LIPID PANEL: CPT | Performed by: FAMILY MEDICINE

## 2019-11-05 PROCEDURE — 85018 HEMOGLOBIN: CPT | Performed by: FAMILY MEDICINE

## 2019-11-05 PROCEDURE — 80048 BASIC METABOLIC PNL TOTAL CA: CPT | Performed by: FAMILY MEDICINE

## 2019-11-05 PROCEDURE — 99395 PREV VISIT EST AGE 18-39: CPT | Mod: 25 | Performed by: FAMILY MEDICINE

## 2019-11-05 PROCEDURE — 99213 OFFICE O/P EST LOW 20 MIN: CPT | Mod: 25 | Performed by: FAMILY MEDICINE

## 2019-11-05 PROCEDURE — 86780 TREPONEMA PALLIDUM: CPT | Performed by: FAMILY MEDICINE

## 2019-11-05 PROCEDURE — 90472 IMMUNIZATION ADMIN EACH ADD: CPT | Performed by: FAMILY MEDICINE

## 2019-11-05 PROCEDURE — 87591 N.GONORRHOEAE DNA AMP PROB: CPT | Performed by: FAMILY MEDICINE

## 2019-11-05 PROCEDURE — 87389 HIV-1 AG W/HIV-1&-2 AB AG IA: CPT | Performed by: FAMILY MEDICINE

## 2019-11-05 PROCEDURE — 36415 COLL VENOUS BLD VENIPUNCTURE: CPT | Performed by: FAMILY MEDICINE

## 2019-11-05 PROCEDURE — 90686 IIV4 VACC NO PRSV 0.5 ML IM: CPT | Performed by: FAMILY MEDICINE

## 2019-11-05 PROCEDURE — 87491 CHLMYD TRACH DNA AMP PROBE: CPT | Performed by: FAMILY MEDICINE

## 2019-11-05 PROCEDURE — 90715 TDAP VACCINE 7 YRS/> IM: CPT | Performed by: FAMILY MEDICINE

## 2019-11-05 PROCEDURE — 90651 9VHPV VACCINE 2/3 DOSE IM: CPT | Performed by: FAMILY MEDICINE

## 2019-11-05 RX ORDER — DEXTROAMPHETAMINE SACCHARATE, AMPHETAMINE ASPARTATE MONOHYDRATE, DEXTROAMPHETAMINE SULFATE AND AMPHETAMINE SULFATE 7.5; 7.5; 7.5; 7.5 MG/1; MG/1; MG/1; MG/1
30 CAPSULE, EXTENDED RELEASE ORAL DAILY
Qty: 30 CAPSULE | Refills: 0 | Status: SHIPPED | OUTPATIENT
Start: 2019-11-07 | End: 2019-11-05

## 2019-11-05 RX ORDER — DEXTROAMPHETAMINE SACCHARATE, AMPHETAMINE ASPARTATE MONOHYDRATE, DEXTROAMPHETAMINE SULFATE AND AMPHETAMINE SULFATE 7.5; 7.5; 7.5; 7.5 MG/1; MG/1; MG/1; MG/1
30 CAPSULE, EXTENDED RELEASE ORAL DAILY
Qty: 30 CAPSULE | Refills: 0 | Status: SHIPPED | OUTPATIENT
Start: 2019-12-05 | End: 2020-02-16

## 2019-11-05 RX ORDER — DEXTROAMPHETAMINE SACCHARATE, AMPHETAMINE ASPARTATE MONOHYDRATE, DEXTROAMPHETAMINE SULFATE AND AMPHETAMINE SULFATE 7.5; 7.5; 7.5; 7.5 MG/1; MG/1; MG/1; MG/1
30 CAPSULE, EXTENDED RELEASE ORAL DAILY
Qty: 30 CAPSULE | Refills: 0 | Status: SHIPPED | OUTPATIENT
Start: 2020-02-07 | End: 2020-02-16

## 2019-11-05 RX ORDER — PHENOL 1.4 %
10 AEROSOL, SPRAY (ML) MUCOUS MEMBRANE
COMMUNITY

## 2019-11-05 RX ORDER — DEXTROAMPHETAMINE SACCHARATE, AMPHETAMINE ASPARTATE MONOHYDRATE, DEXTROAMPHETAMINE SULFATE AND AMPHETAMINE SULFATE 7.5; 7.5; 7.5; 7.5 MG/1; MG/1; MG/1; MG/1
30 CAPSULE, EXTENDED RELEASE ORAL DAILY
Qty: 30 CAPSULE | Refills: 0 | Status: SHIPPED | OUTPATIENT
Start: 2020-01-05 | End: 2020-02-16

## 2019-11-05 ASSESSMENT — MIFFLIN-ST. JEOR: SCORE: 1704.43

## 2019-11-05 NOTE — LETTER
[facility]  [address] [place] [id number]  [phone]                                                                                                November 14, 2019    Patrick Gutierrez  [address] [id number]        Dear Gela Scottieyogi,    Your recent lab results were within normal limits. A copy of those results are included with this letter.        Sincerely,      Katt Meredith DO/hl    Results for orders placed or performed in visit on 11/05/19   Lipid panel reflex to direct LDL Fasting     Status: Abnormal   Result Value Ref Range    Cholesterol 194 <200 mg/dL    Triglycerides 69 <150 mg/dL    HDL Cholesterol 75 >39 mg/dL    LDL Cholesterol Calculated 105 (H) <100 mg/dL    Non HDL Cholesterol 119 <130 mg/dL   Basic metabolic panel     Status: None   Result Value Ref Range    Sodium 139 133 - 144 mmol/L    Potassium 3.6 3.4 - 5.3 mmol/L    Chloride 106 94 - 109 mmol/L    Carbon Dioxide 25 20 - 32 mmol/L    Anion Gap 8 3 - 14 mmol/L    Glucose 84 70 - 99 mg/dL    Urea Nitrogen 12 7 - 30 mg/dL    Creatinine 1.08 0.66 - 1.25 mg/dL    GFR Estimate >90 >60 mL/min/[1.73_m2]    GFR Estimate If [place] >90 >60 mL/min/[1.73_m2]    Calcium 9.5 8.5 - 10.1 mg/dL   Hemoglobin     Status: None   Result Value Ref Range    Hemoglobin 15.7 13.3 - 17.7 g/dL   HIV Antigen Antibody Combo     Status: None   Result Value Ref Range    HIV Antigen Antibody Combo Nonreactive NR^Nonreactive       Treponema Abs w Reflex to RPR and Titer     Status: None   Result Value Ref Range    Treponema Antibodies Nonreactive NR^Nonreactive   Chlamydia trachomatis PCR     Status: None   Result Value Ref Range    Specimen Description Urine     Chlamydia Trachomatis PCR Negative NEG^Negative   Neisseria gonorrhoeae PCR     Status: None   Result Value Ref Range    Specimen Descrip Urine     N Gonorrhea PCR Negative NEG^Negative

## 2019-11-05 NOTE — PROGRESS NOTES
3  SUBJECTIVE:   CC: Patrick Gutierrez is an 24 year old male who presents for preventive health visit.     Healthy Habits:    Do you get at least three servings of calcium containing foods daily (dairy, green leafy vegetables, etc.)? no    Amount of exercise or daily activities, outside of work: 4-5 day(s) per week    Problems taking medications regularly No    Medication side effects: No    Have you had an eye exam in the past two years? yes    Do you see a dentist twice per year? yes    Do you have sleep apnea, excessive snoring or daytime drowsiness? Difficulties sleeping       Medication Followup of Adderall    Taking Medication as prescribed: yes    Side Effects:  None    Medication Helping Symptoms:  yes     Asthma Follow-Up    Was ACT completed today?    Yes    ACT Total Scores 11/5/2019   ACT TOTAL SCORE (Goal Greater than or Equal to 20) 24   In the past 12 months, how many times did you visit the emergency room for your asthma without being admitted to the hospital? 0   In the past 12 months, how many times were you hospitalized overnight because of your asthma? 0       How many days per week do you miss taking your asthma controller medication?  I do not have an asthma controller medication    Please describe any recent triggers for your asthma: None    Have you had any Emergency Room Visits, Urgent Care Visits, or Hospital Admissions since your last office visit?  No      Today's PHQ-2 Score:   PHQ-2 ( 1999 [employer]) 11/5/2019 8/24/2018   Q1: Little interest or pleasure in doing things 0 0   Q2: Feeling down, depressed or hopeless 0 0   PHQ-2 Score 0 0       Abuse: Current or Past(Physical, Sexual or Emotional)- No  Do you feel safe in your environment? Yes    Having a hard time with sleep-taking melatonin and benadryl  Problems even before stimulant  Takes adhd med daily    Lost weight doing intermittent fasting  Does not eat lunch or breakfast    Adopted worried a lot about diseases, cholesterol ,  "DIABETES MELLITUS      Social History     Tobacco Use     Smoking status: Never Smoker     Smokeless tobacco: Never Used   Substance Use Topics     Alcohol use: No     If you drink alcohol do you typically have >3 drinks per day or >7 drinks per week? No                      Last PSA: No results found for: PSA    Reviewed orders with patient. Reviewed health maintenance and updated orders accordingly - Yes  Lab work is in process  Labs reviewed in EPIC  BP Readings from Last 3 Encounters:   11/05/19 119/82   05/01/19 132/85   02/04/19 124/76    Wt Readings from Last 3 Encounters:   11/05/19 68 kg (150 lb)   05/01/19 70.3 kg (155 lb)   02/04/19 78.5 kg (173 lb)                    Reviewed and updated as needed this visit by clinical staff  Tobacco  Allergies  Meds         Reviewed and updated as needed this visit by Provider        No past medical history on file.   No past surgical history on file.  OB History   No obstetric history on file.       ROS:  CONSTITUTIONAL: NEGATIVE for fever, chills, change in weight  INTEGUMENTARY/SKIN: NEGATIVE for worrisome rashes, moles or lesions  EYES: NEGATIVE for vision changes or irritation  ENT: NEGATIVE for ear, mouth and throat problems  RESP: NEGATIVE for significant cough or SOB  CV: NEGATIVE for chest pain, palpitations or peripheral edema  GI: NEGATIVE for nausea, abdominal pain, heartburn, or change in bowel habits   male: negative for dysuria, hematuria, decreased urinary stream, erectile dysfunction, urethral discharge  MUSCULOSKELETAL: NEGATIVE for significant arthralgias or myalgia  NEURO: NEGATIVE for weakness, dizziness or paresthesias  PSYCHIATRIC: NEGATIVE for changes in mood or affect    OBJECTIVE:   /82   Pulse 60   Temp 97.6  F (36.4  C) (Oral)   Ht 1.822 m (5' 11.75\")   Wt 68 kg (150 lb)   BMI 20.49 kg/m    EXAM:  GENERAL: healthy, alert and no distress  EYES: Eyes grossly normal to inspection, PERRL and conjunctivae and sclerae normal  HENT: " ear canals and TM's normal, nose and mouth without ulcers or lesions  NECK: no adenopathy, no asymmetry, masses, or scars and thyroid normal to palpation  RESP: lungs clear to auscultation - no rales, rhonchi or wheezes  CV: regular rate and rhythm, normal S1 S2, no S3 or S4, no murmur, click or rub, no peripheral edema and peripheral pulses strong  ABDOMEN: soft, nontender, no hepatosplenomegaly, no masses and bowel sounds normal  MS: no gross musculoskeletal defects noted, no edema  SKIN: no suspicious lesions or rashes  NEURO: Normal strength and tone, mentation intact and speech normal  PSYCH: mentation appears normal, affect normal/bright    Diagnostic Test Results:  Labs reviewed in Epic    ASSESSMENT/PLAN:       ICD-10-CM    1. Routine general medical examination at a health care facility Z00.00 Vaccine Administration, Each Additional [18284]     Hemoglobin   2. Attention deficit hyperactivity disorder (ADHD), predominantly inattentive type F90.0 Basic metabolic panel     amphetamine-dextroamphetamine (ADDERALL XR) 30 MG 24 hr capsule     amphetamine-dextroamphetamine (ADDERALL XR) 30 MG 24 hr capsule     amphetamine-dextroamphetamine (ADDERALL XR) 30 MG 24 hr capsule     DISCONTINUED: amphetamine-dextroamphetamine (ADDERALL XR) 30 MG 24 hr capsule   3. Need for prophylactic vaccination and inoculation against influenza Z23 INFLUENZA VACCINE IM > 6 MONTHS VALENT IIV4 [69678]     Vaccine Administration, Initial [34517]   4. Exercise-induced asthma J45.990    5. Adopted person Z02.82    6. Lipid screening Z13.220 Lipid panel reflex to direct LDL Fasting   7. Screen for STD (sexually transmitted disease) Z11.3 HIV Antigen Antibody Combo     Treponema Abs w Reflex to RPR and Titer     Chlamydia trachomatis PCR     Neisseria gonorrhoeae PCR   8. Screening for diabetes mellitus Z13.1 Basic metabolic panel   9. Slow transit constipation K59.01    10. Insomnia due to medical condition G47.01      ADHD-stable, cont  "adderall as planned, no changes  Insomnia-? Due to adderall, advise taking holiday breaks, taking med early, using melatonin earlier, avoid benadryl , follow up if persisting  Constipation-not eating or drinking enough, avoid too much fasting, close monitoing of not losing weight, reviewed healthy diet, increase fluids, water, fiber  Screening labs done-adopted, baseline test done  Update shots, including HPV  Asthma-exercise induced, no longer needing albuterol    COUNSELING:  Reviewed preventive health counseling, as reflected in patient instructions    Estimated body mass index is 20.49 kg/m  as calculated from the following:    Height as of this encounter: 1.822 m (5' 11.75\").    Weight as of this encounter: 68 kg (150 lb).         reports that he has never smoked. He has never used smokeless tobacco.      Counseling Resources:  ATP IV Guidelines  Pooled Cohorts Equation Calculator  FRAX Risk Assessment  ICSI Preventive Guidelines  Dietary Guidelines for Americans, 2010  USDA's MyPlate  ASA Prophylaxis  Lung CA Screening    Katt Meredith DO  [facility]  "

## 2019-11-05 NOTE — PATIENT INSTRUCTIONS
Increase fluids, and start eating lunch please    Preventive Health Recommendations  Male Ages 21 - 25     Yearly exam:             See your health care provider every year in order to  o   Review health changes.   o   Discuss preventive care.    o   Review your medicines if your doctor has prescribed any.    You should be tested each year for STDs (sexually transmitted diseases).     Talk to your provider about cholesterol testing.      If you are at risk for diabetes, you should have a diabetes test (fasting glucose).    Shots: Get a flu shot each year. Get a tetanus shot every 10 years.     Nutrition:    Eat at least 5 servings of fruits and vegetables daily.     Eat whole-grain bread, whole-wheat pasta and brown rice instead of white grains and rice.     Get adequate calcium and Vitamin D.     Lifestyle    Exercise for at least 150 minutes a week (30 minutes a day, 5 days a week). This will help you control your weight and prevent disease.     Limit alcohol to one drink per day.     No smoking.     Wear sunscreen to prevent skin cancer.     See your dentist every six months for an exam and cleaning.

## 2019-11-06 LAB
C TRACH DNA SPEC QL NAA+PROBE: NEGATIVE
HIV 1+2 AB+HIV1 P24 AG SERPL QL IA: NONREACTIVE
N GONORRHOEA DNA SPEC QL NAA+PROBE: NEGATIVE
SPECIMEN SOURCE: NORMAL
SPECIMEN SOURCE: NORMAL

## 2019-11-06 ASSESSMENT — ASTHMA QUESTIONNAIRES: ACT_TOTALSCORE: 24

## 2019-11-07 LAB — T PALLIDUM AB SER QL: NONREACTIVE

## 2019-11-18 ENCOUNTER — TELEPHONE (OUTPATIENT)
Dept: FAMILY MEDICINE | Facility: CLINIC | Age: 24
End: 2019-11-18

## 2019-11-18 NOTE — TELEPHONE ENCOUNTER
Attempt #1 to call patient.     Patient did not answer, RN left voicemail at home number. RN requested patient return call to Nurse Triage line at [phone].     RN sees lab letter sent as normal    Lise Connell RN, BSN, PHN  [facility]: [place]

## 2019-11-18 NOTE — TELEPHONE ENCOUNTER
Reason for Call:  Request for results:    Name of test or procedure: cholesterol lab    Date of test of procedure: 11/5/19    Location of the test or procedure: [facility]    OK to leave the result message on voice mail or with a family member? YES    Phone number Patient can be reached at:  Home number on file [phone] (home)    Additional comments: n/a    Call taken on 11/18/2019 at 5:44 PM by Margaret Lawrence

## 2019-11-19 NOTE — TELEPHONE ENCOUNTER
Patient returns call to RN line and was informed of his recent cholesterol results. Instructed on methods of improving LDL, such as decreasing trans and saturated fats, eating healthy fats like salmon, avocado, nuts and good fiber, exercise with understanding voiced.    Siri Sparks RN

## 2020-02-14 ENCOUNTER — TELEPHONE (OUTPATIENT)
Dept: FAMILY MEDICINE | Facility: CLINIC | Age: 25
End: 2020-02-14

## 2020-02-14 DIAGNOSIS — F90.0 ATTENTION DEFICIT HYPERACTIVITY DISORDER (ADHD), PREDOMINANTLY INATTENTIVE TYPE: ICD-10-CM

## 2020-02-14 NOTE — TELEPHONE ENCOUNTER
"Routing refill request for Adderall to PCP.  Visit note from 11/5/19 is pasted below.    Per provider note from 11/5/19, patient stable on current medication, no note indicating patient needs 3 month f/u, so would generally be every 6 months.  Returned call to patient and left detailed VM (states okay below) that he should likely not need to be seen for another 3 months.  Instructed him to I'll submit a request for Adderall refills, and we'll let him know if PCP would like to see him prior to 6 months.    Siri Sparks RN     \"ADHD-stable, cont adderall as planned, no changes\"  "

## 2020-02-14 NOTE — TELEPHONE ENCOUNTER
Reason for Call:  Other     Detailed comments:  Patient just filled his last paper script that was given to him by Dr Meredith for his aderral. Patient is wanting to know if he needs to be seen or will she just give him the next 3 scripts.  Please call and let him know either way as to what is going on.  Patient uses the [facility].    Phone Number Patient can be reached at: Home number on file [phone] (home)    Best Time: any    Can we leave a detailed message on this number? YES    Call taken on 2/14/2020 at 10:31 AM by Dana Victor

## 2020-02-16 RX ORDER — DEXTROAMPHETAMINE SACCHARATE, AMPHETAMINE ASPARTATE MONOHYDRATE, DEXTROAMPHETAMINE SULFATE AND AMPHETAMINE SULFATE 7.5; 7.5; 7.5; 7.5 MG/1; MG/1; MG/1; MG/1
30 CAPSULE, EXTENDED RELEASE ORAL DAILY
Qty: 30 CAPSULE | Refills: 0 | Status: SHIPPED | OUTPATIENT
Start: 2020-04-07 | End: 2020-05-06

## 2020-02-16 RX ORDER — DEXTROAMPHETAMINE SACCHARATE, AMPHETAMINE ASPARTATE MONOHYDRATE, DEXTROAMPHETAMINE SULFATE AND AMPHETAMINE SULFATE 7.5; 7.5; 7.5; 7.5 MG/1; MG/1; MG/1; MG/1
30 CAPSULE, EXTENDED RELEASE ORAL DAILY
Qty: 30 CAPSULE | Refills: 0 | Status: SHIPPED | OUTPATIENT
Start: 2020-03-06 | End: 2020-05-06

## 2020-02-16 RX ORDER — DEXTROAMPHETAMINE SACCHARATE, AMPHETAMINE ASPARTATE MONOHYDRATE, DEXTROAMPHETAMINE SULFATE AND AMPHETAMINE SULFATE 7.5; 7.5; 7.5; 7.5 MG/1; MG/1; MG/1; MG/1
30 CAPSULE, EXTENDED RELEASE ORAL DAILY
Qty: 30 CAPSULE | Refills: 0 | Status: SHIPPED | OUTPATIENT
Start: 2020-05-07 | End: 2020-05-06

## 2020-02-16 RX ORDER — DEXTROAMPHETAMINE SACCHARATE, AMPHETAMINE ASPARTATE, DEXTROAMPHETAMINE SULFATE AND AMPHETAMINE SULFATE 1.25; 1.25; 1.25; 1.25 MG/1; MG/1; MG/1; MG/1
5 TABLET ORAL DAILY
Qty: 30 TABLET | Refills: 0 | Status: SHIPPED | OUTPATIENT
Start: 2020-02-16 | End: 2020-02-19

## 2020-02-18 ENCOUNTER — TELEPHONE (OUTPATIENT)
Dept: FAMILY MEDICINE | Facility: CLINIC | Age: 25
End: 2020-02-18

## 2020-02-18 NOTE — TELEPHONE ENCOUNTER
Reason for Call:  Other prescription    Detailed comments:  patient is calling to speak with nurse doesn't need a refill right now but is wondering if he needs to be seen before his next refill, patient has used his last written script from pcp    Patient also is letting nurse know he hasn't taken the 5 mg in a long time but is taking 30 mg    Patient will not be picking up the medication that is ready at the pharmacy now.     Phone Number Patient can be reached at: Home number on file [phone] (home)    Best Time: any    Can we leave a detailed message on this number? YES    Call taken on 2/18/2020 at 5:52 PM by Kalee Capellan

## 2020-02-18 NOTE — TELEPHONE ENCOUNTER
Left detailed message for patient that his Adderall prescription was sent on 2/16 to the [facility] and come and .    Marcelina Yoon RN

## 2020-02-18 NOTE — TELEPHONE ENCOUNTER
Reason for Call:  Other call back    Detailed comments: patient is calling to check on status on message below wondering if he needs to be seen now for next refill   Please call to advice  Thank you     Phone Number Patient can be reached at: Home number on file [phone] (home)    Best Time: any    Can we leave a detailed message on this number? YES    Call taken on 2/18/2020 at 3:47 PM by Kalee Capellan

## 2020-02-19 NOTE — TELEPHONE ENCOUNTER
Left detailed message on patient's voicemail informing him that he will need to be seen before his May prescription runs out.  Jagdish Carlos RN

## 2020-02-19 NOTE — TELEPHONE ENCOUNTER
I have already taken it off the med list  I did send the 30mg already  Thanks  Katt Meredith D.O.

## 2020-02-19 NOTE — TELEPHONE ENCOUNTER
Routing to Dr Meredith to please advise.      Patient reports not taking 5 mg Adderall in a very long time. He will not be picking up this prescription.  He is requesting 30 mg caps, which I see you have as future orders.       His last OV was 11/05/19 so should not need to be seen until May 2020 right?      Marcelina FUENTES RN

## 2020-04-24 ENCOUNTER — HOSPITAL ENCOUNTER (EMERGENCY)
Facility: CLINIC | Age: 25
Discharge: HOME | End: 2020-04-24
Attending: NURSE PRACTITIONER | Admitting: NURSE PRACTITIONER
Payer: COMMERCIAL

## 2020-04-24 ENCOUNTER — APPOINTMENT (OUTPATIENT)
Dept: CT IMAGING | Facility: CLINIC | Age: 25
End: 2020-04-24
Attending: NURSE PRACTITIONER
Payer: COMMERCIAL

## 2020-04-24 VITALS
WEIGHT: 162 LBS | TEMPERATURE: 97.6 F | HEIGHT: 70 IN | RESPIRATION RATE: 18 BRPM | SYSTOLIC BLOOD PRESSURE: 128 MMHG | BODY MASS INDEX: 23.19 KG/M2 | OXYGEN SATURATION: 100 % | HEART RATE: 74 BPM | DIASTOLIC BLOOD PRESSURE: 64 MMHG

## 2020-04-24 DIAGNOSIS — N20.0 KIDNEY STONE: ICD-10-CM

## 2020-04-24 LAB
ALBUMIN UR-MCNC: NEGATIVE MG/DL
APPEARANCE UR: CLEAR
BILIRUB UR QL STRIP: NEGATIVE
COLOR UR AUTO: YELLOW
GLUCOSE UR STRIP-MCNC: NEGATIVE MG/DL
HGB UR QL STRIP: ABNORMAL
KETONES UR STRIP-MCNC: NEGATIVE MG/DL
LEUKOCYTE ESTERASE UR QL STRIP: NEGATIVE
MUCOUS THREADS #/AREA URNS LPF: PRESENT /LPF
NITRATE UR QL: NEGATIVE
PH UR STRIP: 6 PH (ref 5–7)
RBC #/AREA URNS AUTO: 83 /HPF (ref 0–2)
SOURCE: ABNORMAL
SP GR UR STRIP: 1.02 (ref 1–1.03)
UROBILINOGEN UR STRIP-MCNC: 0 MG/DL (ref 0–2)
WBC #/AREA URNS AUTO: <1 /HPF (ref 0–5)

## 2020-04-24 PROCEDURE — 99285 EMERGENCY DEPT VISIT HI MDM: CPT | Mod: 25

## 2020-04-24 PROCEDURE — 99284 EMERGENCY DEPT VISIT MOD MDM: CPT | Mod: Z6 | Performed by: NURSE PRACTITIONER

## 2020-04-24 PROCEDURE — 25000128 H RX IP 250 OP 636: Performed by: NURSE PRACTITIONER

## 2020-04-24 PROCEDURE — 96361 HYDRATE IV INFUSION ADD-ON: CPT

## 2020-04-24 PROCEDURE — 25800030 ZZH RX IP 258 OP 636: Performed by: NURSE PRACTITIONER

## 2020-04-24 PROCEDURE — 96374 THER/PROPH/DIAG INJ IV PUSH: CPT

## 2020-04-24 PROCEDURE — 81001 URINALYSIS AUTO W/SCOPE: CPT | Performed by: EMERGENCY MEDICINE

## 2020-04-24 PROCEDURE — 74176 CT ABD & PELVIS W/O CONTRAST: CPT

## 2020-04-24 PROCEDURE — 96375 TX/PRO/DX INJ NEW DRUG ADDON: CPT

## 2020-04-24 RX ORDER — TAMSULOSIN HYDROCHLORIDE 0.4 MG/1
0.4 CAPSULE ORAL DAILY
Qty: 10 CAPSULE | Refills: 0 | Status: SHIPPED | OUTPATIENT
Start: 2020-04-24 | End: 2020-05-04

## 2020-04-24 RX ORDER — KETOROLAC TROMETHAMINE 30 MG/ML
30 INJECTION, SOLUTION INTRAMUSCULAR; INTRAVENOUS ONCE
Status: COMPLETED | OUTPATIENT
Start: 2020-04-24 | End: 2020-04-24

## 2020-04-24 RX ORDER — ONDANSETRON 2 MG/ML
4 INJECTION INTRAMUSCULAR; INTRAVENOUS ONCE
Status: COMPLETED | OUTPATIENT
Start: 2020-04-24 | End: 2020-04-24

## 2020-04-24 RX ORDER — SODIUM CHLORIDE 9 MG/ML
1000 INJECTION, SOLUTION INTRAVENOUS CONTINUOUS
Status: DISCONTINUED | OUTPATIENT
Start: 2020-04-24 | End: 2020-04-24 | Stop reason: HOSPADM

## 2020-04-24 RX ORDER — HYDROCODONE BITARTRATE AND ACETAMINOPHEN 5; 325 MG/1; MG/1
1 TABLET ORAL EVERY 6 HOURS PRN
Qty: 10 TABLET | Refills: 0 | Status: SHIPPED | OUTPATIENT
Start: 2020-04-24 | End: 2020-04-27

## 2020-04-24 RX ADMIN — SODIUM CHLORIDE 1000 ML: 9 INJECTION, SOLUTION INTRAVENOUS at 18:31

## 2020-04-24 RX ADMIN — KETOROLAC TROMETHAMINE 30 MG: 30 INJECTION, SOLUTION INTRAMUSCULAR at 18:32

## 2020-04-24 RX ADMIN — ONDANSETRON 4 MG: 2 INJECTION INTRAMUSCULAR; INTRAVENOUS at 18:32

## 2020-04-24 ASSESSMENT — ENCOUNTER SYMPTOMS
CHILLS: 0
CONSTIPATION: 0
ABDOMINAL PAIN: 1
SHORTNESS OF BREATH: 0
LIGHT-HEADEDNESS: 1
VOMITING: 0
FEVER: 0
FATIGUE: 0
COUGH: 0
NAUSEA: 1
DIARRHEA: 0
DYSURIA: 0
FLANK PAIN: 1

## 2020-04-24 ASSESSMENT — MIFFLIN-ST. JEOR: SCORE: 1726.08

## 2020-04-24 NOTE — ED AVS SNAPSHOT
[facility] Department  [address] [address]  Phone:  [phone]  Fax:  [phone]                                    Patrick Gutierrez   MRN: [id number]    Department:  [facility] Emergency Department   Date of Visit:  4/24/2020           After Visit Summary Signature Page    I have received my discharge instructions, and my questions have been answered. I have discussed any challenges I see with this plan with the nurse or doctor.    ..........................................................................................................................................  Patient/Patient Representative Signature      ..........................................................................................................................................  Patient Representative Print Name and Relationship to Patient    ..................................................               ................................................  Date                                   Time    ..........................................................................................................................................  Reviewed by Signature/Title    ...................................................              ..............................................  Date                                               Time          22EPIC Rev 08/18

## 2020-04-25 NOTE — DISCHARGE INSTRUCTIONS
Flomax 0.4  mg daily.  Ibuprofen 400-600 mg every 6-8 hours as needed for pain  (take with food, stop if causing stomach pains.)  Norco 1 tablet every 6 hours as needed for moderate/severe pain.  Drink plenty of fluids.  Return to the emergency  department for fevers, vomiting, increased pain, or new symptoms of concern.

## 2020-05-06 ENCOUNTER — VIRTUAL VISIT (OUTPATIENT)
Dept: FAMILY MEDICINE | Facility: CLINIC | Age: 25
End: 2020-05-06
Payer: COMMERCIAL

## 2020-05-06 ENCOUNTER — TELEPHONE (OUTPATIENT)
Dept: FAMILY MEDICINE | Facility: CLINIC | Age: 25
End: 2020-05-06

## 2020-05-06 DIAGNOSIS — Z09 HOSPITAL DISCHARGE FOLLOW-UP: Primary | ICD-10-CM

## 2020-05-06 DIAGNOSIS — F90.0 ATTENTION DEFICIT HYPERACTIVITY DISORDER (ADHD), PREDOMINANTLY INATTENTIVE TYPE: ICD-10-CM

## 2020-05-06 DIAGNOSIS — N20.0 KIDNEY STONE: ICD-10-CM

## 2020-05-06 PROCEDURE — 99213 OFFICE O/P EST LOW 20 MIN: CPT | Mod: 95 | Performed by: FAMILY MEDICINE

## 2020-05-06 RX ORDER — DEXTROAMPHETAMINE SACCHARATE, AMPHETAMINE ASPARTATE MONOHYDRATE, DEXTROAMPHETAMINE SULFATE AND AMPHETAMINE SULFATE 7.5; 7.5; 7.5; 7.5 MG/1; MG/1; MG/1; MG/1
30 CAPSULE, EXTENDED RELEASE ORAL DAILY
Qty: 30 CAPSULE | Refills: 0 | Status: SHIPPED | OUTPATIENT
Start: 2020-07-07 | End: 2020-08-04

## 2020-05-06 RX ORDER — DEXTROAMPHETAMINE SACCHARATE, AMPHETAMINE ASPARTATE MONOHYDRATE, DEXTROAMPHETAMINE SULFATE AND AMPHETAMINE SULFATE 7.5; 7.5; 7.5; 7.5 MG/1; MG/1; MG/1; MG/1
30 CAPSULE, EXTENDED RELEASE ORAL DAILY
Qty: 30 CAPSULE | Refills: 0 | Status: SHIPPED | OUTPATIENT
Start: 2020-05-06 | End: 2020-11-10

## 2020-05-06 RX ORDER — DEXTROAMPHETAMINE SACCHARATE, AMPHETAMINE ASPARTATE MONOHYDRATE, DEXTROAMPHETAMINE SULFATE AND AMPHETAMINE SULFATE 7.5; 7.5; 7.5; 7.5 MG/1; MG/1; MG/1; MG/1
30 CAPSULE, EXTENDED RELEASE ORAL DAILY
Qty: 30 CAPSULE | Refills: 0 | Status: SHIPPED | OUTPATIENT
Start: 2020-06-06 | End: 2020-11-10

## 2020-05-06 NOTE — TELEPHONE ENCOUNTER
Dr. Meredith will call patient when she arrives home from the clinic.   She informed patient that she would call him back. He stated he was available.     Rosa Maria Butt MA

## 2020-05-06 NOTE — PROGRESS NOTES
"Patrick Gutierrez is a 25 year old male who is being evaluated via a billable video visit.      The patient has been notified of following:     \"This video visit will be conducted via a call between you and your physician/provider. We have found that certain health care needs can be provided without the need for an in-person physical exam.  This service lets us provide the care you need with a video conversation.  If a prescription is necessary we can send it directly to your pharmacy.  If lab work is needed we can place an order for that and you can then stop by our lab to have the test done at a later time.    Video visits are billed at different rates depending on your insurance coverage.  Please reach out to your insurance provider with any questions.    If during the course of the call the physician/provider feels a video visit is not appropriate, you will not be charged for this service.\"    Patient has given verbal consent for Video visit? Yes    How would you like to obtain your AVS? Mail a copy    Patient would like the video invitation sent by: Text to cell phone: [phone]    Will anyone else be joining your video visit? No      Subjective     Patrick Gutierrez is a 25 year old male who presents to clinic today for the following health issues:    HPI  ED/UC Followup:    Facility:  [facility]   Date of visit: 4/24/2020  Reason for visit: Kidney stone   Current Status: stable        Medication Followup of Adderall    Taking Medication as prescribed: yes  Side Effects:  NoADHD Follow-Up    Date of last ADHD office visit:  3 months ago  Status since last visit: Improving  Taking controlled (daily) medications as prescribed: Yes                       Parent/Patient Concerns with Medications: None  ADHD Medication     Amphetamines Disp Start End     amphetamine-dextroamphetamine (ADDERALL XR) 30 MG 24 hr capsule    30 capsule 7/7/2020     Sig - Route: Take 1 capsule (30 mg) by mouth daily - Oral    Class: " E-Prescribe    Earliest Fill Date: 7/7/2020     amphetamine-dextroamphetamine (ADDERALL XR) 30 MG 24 hr capsule    30 capsule 6/6/2020     Sig - Route: Take 1 capsule (30 mg) by mouth daily - Oral    Class: E-Prescribe    Earliest Fill Date: 6/6/2020     amphetamine-dextroamphetamine (ADDERALL XR) 30 MG 24 hr capsule    30 capsule 5/6/2020     Sig - Route: Take 1 capsule (30 mg) by mouth daily - Oral    Class: E-Prescribe    Earliest Fill Date: 5/6/2020     amphetamine-dextroamphetamine (ADDERALL XR) 30 MG 24 hr capsule    30 capsule 10/7/2019     Sig - Route: Take 1 capsule (30 mg) by mouth daily - Oral    Patient not taking:  Reported on 11/5/2019       Class: Local Print    Earliest Fill Date: 10/7/2019                        Video Start Time:1:28    hospital follow up   Renal stones, resolved pain, he did not have to use vicodin or flomax  He is not too great about drinking fluids, he pushed fluids and resolved sx    adhd-symptoms stable  Using sleep aid-melatonin and benadryl working well    Patient Active Problem List   Diagnosis     Exercise-induced asthma     Attention deficit hyperactivity disorder (ADHD), predominantly inattentive type     No past surgical history on file.    Social History     Tobacco Use     Smoking status: Never Smoker     Smokeless tobacco: Never Used   Substance Use Topics     Alcohol use: No     Family History   Problem Relation Age of Onset     Unknown/Adopted Mother      Unknown/Adopted Father      Unknown/Adopted Maternal Grandmother      Unknown/Adopted Maternal Grandfather      Unknown/Adopted Paternal Grandmother      Unknown/Adopted Paternal Grandfather            Reviewed and updated as needed this visit by Provider         Review of Systems   ROS COMP: Constitutional, HEENT, cardiovascular, pulmonary, GI, , musculoskeletal, neuro, skin, endocrine and psych systems are negative, except as otherwise noted.      Objective    There were no vitals taken for this  "visit.  Estimated body mass index is 23.24 kg/m  as calculated from the following:    Height as of 4/24/20: 1.778 m (5' 10\").    Weight as of 4/24/20: 73.5 kg (162 lb).  Physical Exam     GENERAL: healthy, alert and no distress  EYES: Eyes grossly normal to inspection, conjunctivae and sclerae normal  RESP: no audible wheeze, cough, or visible cyanosis.  No visible retractions or increased work of breathing.  Able to speak fully in complete sentences.  NEURO: Cranial nerves grossly intact, mentation intact and speech normal  PSYCH: mentation appears normal, affect normal/bright, judgement and insight intact, normal speech and appearance well-groomed      Diagnostic Test Results:  Labs reviewed in Epic        Assessment & Plan       ICD-10-CM    1. Hospital discharge follow-up  Z09    2. Attention deficit hyperactivity disorder (ADHD), predominantly inattentive type  F90.0 amphetamine-dextroamphetamine (ADDERALL XR) 30 MG 24 hr capsule     amphetamine-dextroamphetamine (ADDERALL XR) 30 MG 24 hr capsule     amphetamine-dextroamphetamine (ADDERALL XR) 30 MG 24 hr capsule   3. Kidney stone  N20.0       renal stones-resolved sx, doing well, did not use meds, advised hydration  adhd-stable symptoms , sleep much better now,   Refilled med for 3 months, call in 3 months  Follow up in office in 6 months      See Patient Instructions    No follow-ups on file.    Katt Meredith DO  [facility]      Video-Visit Details    Type of service:  Video Visit    Video End Time: 1:53PM    Originating Location (pt. Location): Home    Distant Location (provider location):  [facility]     Platform used for Video Visit: Doximity    No follow-ups on file.       Katt Meredith DO      "

## 2020-05-06 NOTE — TELEPHONE ENCOUNTER
Reason for Call:  Other appointment    Detailed comments: Patient calling back, still waiting for PCP to call him back in regards to his appt this afternoon at 12 pm     Phone Number Patient can be reached at: Home number on file [phone] (home)    Best Time: any     Can we leave a detailed message on this number? YES    Call taken on 5/6/2020 at 12:30 PM by Paola Sellers

## 2020-05-08 NOTE — TELEPHONE ENCOUNTER
Called [facility] and they have 4 scripts (August and September scripts of both meds) on file and he will delete/shred them.  They are schedule 2 medications so they can not be transferred.   Sindhu Montano RN    [General Appearance - Well Developed] : well developed [Normal Appearance] : normal appearance [Well Groomed] : well groomed [General Appearance - In No Acute Distress] : no acute distress [General Appearance - Well Nourished] : well nourished [Normal Conjunctiva] : the conjunctiva exhibited no abnormalities [No Oral Pallor] : no oral pallor [No Oral Cyanosis] : no oral cyanosis [Normal Jugular Venous V Waves Present] : normal jugular venous V waves present [No Jugular Venous Duong A Waves] : no jugular venous duong A waves [Respiration, Rhythm And Depth] : normal respiratory rhythm and effort [Exaggerated Use Of Accessory Muscles For Inspiration] : no accessory muscle use [Auscultation Breath Sounds / Voice Sounds] : lungs were clear to auscultation bilaterally [Heart Rate And Rhythm] : heart rate and rhythm were normal [Heart Sounds] : normal S1 and S2 [Arterial Pulses Normal] : the arterial pulses were normal [Edema] : no peripheral edema present [Systolic grade ___/6] : A grade [unfilled]/6 systolic murmur was heard. [Bowel Sounds] : normal bowel sounds [Abnormal Walk] : normal gait [Cyanosis, Localized] : no localized cyanosis [Nail Clubbing] : no clubbing of the fingernails [Skin Turgor] : normal skin turgor [] : no rash [Oriented To Time, Place, And Person] : oriented to person, place, and time [Impaired Insight] : insight and judgment were intact [Affect] : the affect was normal [Memory Recent] : recent memory was not impaired

## 2020-08-04 ENCOUNTER — TELEPHONE (OUTPATIENT)
Dept: FAMILY MEDICINE | Facility: CLINIC | Age: 25
End: 2020-08-04

## 2020-08-04 DIAGNOSIS — F90.0 ATTENTION DEFICIT HYPERACTIVITY DISORDER (ADHD), PREDOMINANTLY INATTENTIVE TYPE: ICD-10-CM

## 2020-08-04 NOTE — TELEPHONE ENCOUNTER
Forwarding request for new Adderall 30mg Rx to PCP.  Please see note below. Rx pended.    RN reached out to [facility] to clarify.  According to our record, Adderall Rxs were done for 5/6, 6/6 and 7/7 and sent to [facility] electronically. Has patient not picked up the 7/7 Rx yet?  Joana, pharmacy staff states patient picked up last Rx on 7/14/20, still has one Rx left on file for Adderall XR 30.  They are unable to transfer due to controlled substance.  RN instructed they can cancel this Rx and will forward to PCP for new Rx to send to our pharmacy.    Siri Sparks RN

## 2020-08-04 NOTE — TELEPHONE ENCOUNTER
Reason for Call:  Medication or medication refill:    Do you use a [facility]?  Name of the pharmacy and phone number for the current request:  [facility] [place]    Name of the medication requested: Adderall 30 MG    Other request: Patient states [facility] sent 3 months of scripts to [facility] for him and that [place] and would like to have that 3rd [place] at [facility]. Patient states that the [facility] pharmacy would not transfer it to [facility] and he would like to know if he can have that 3 script resent to [facility] instead of [facility]. Please call with any questions.     Can we leave a detailed message on this number? YES    Phone number patient can be reached at: Home number on file [phone] (home)    Best Time: any     Call taken on 8/4/2020 at 2:09 PM by Cielo Westbrook

## 2020-08-05 RX ORDER — DEXTROAMPHETAMINE SACCHARATE, AMPHETAMINE ASPARTATE MONOHYDRATE, DEXTROAMPHETAMINE SULFATE AND AMPHETAMINE SULFATE 7.5; 7.5; 7.5; 7.5 MG/1; MG/1; MG/1; MG/1
30 CAPSULE, EXTENDED RELEASE ORAL DAILY
Qty: 30 CAPSULE | Refills: 0 | Status: SHIPPED | OUTPATIENT
Start: 2020-08-14 | End: 2020-09-15

## 2020-08-05 NOTE — TELEPHONE ENCOUNTER
Forwarding back to PCP as FYI only.     Reached out to patient and relayed below with understanding voiced. This is correct, he had picked up last Rx from [facility] on 7/14/20 so will be due for refill 8/14/20.  Nothing further needed at this time.    Siri Sparks RN

## 2020-08-05 NOTE — TELEPHONE ENCOUNTER
So I did send script to the  Pharmacy pended for me, 3mg  adderall for august since he picked up 7/14.  Please let me know if  That is  Accurate  Katt Meredith D.O.

## 2020-08-19 ENCOUNTER — TELEPHONE (OUTPATIENT)
Dept: FAMILY MEDICINE | Facility: CLINIC | Age: 25
End: 2020-08-19

## 2020-08-19 DIAGNOSIS — J45.990 EXERCISE-INDUCED ASTHMA: ICD-10-CM

## 2020-08-19 NOTE — TELEPHONE ENCOUNTER
Patient/family was instructed to return call to [facility], directly on the RN Call back line at [phone].    Iris Blackwell RN

## 2020-08-19 NOTE — TELEPHONE ENCOUNTER
"Patient returns call to triage line. Beep sound starts before patient begins speaking. He says \" I called earlier to confirm that I have exercise induced asthma that I treat with Albuterol inhaler. Today is 8/19/20, correct?\"    RN confirmed. RN asked if patient was recording conversation, patient denied. States he does not need anything else.     Lise Connell, RN, BSN, PHN  [facility]: [place]    "

## 2020-08-19 NOTE — TELEPHONE ENCOUNTER
Reason for Call:  Other call back    Detailed comments: Patient is requesting a callback from a nurse to discuss information in his medical record regarding asthma. He is working on having records requested for a work leave, but [employer] is a few weeks behind.     Phone Number Patient can be reached at: Cell number on file:    Telephone Information:   Mobile [phone]       Best Time: as soon as possible per patient.    Can we leave a detailed message on this number? YES    Call taken on 8/19/2020 at 8:17 AM by Vernell Zamudio

## 2020-08-20 RX ORDER — ALBUTEROL SULFATE 90 UG/1
AEROSOL, METERED RESPIRATORY (INHALATION)
Qty: 8.5 G | Refills: 1 | Status: SHIPPED | OUTPATIENT
Start: 2020-08-20 | End: 2022-10-26

## 2020-08-20 NOTE — TELEPHONE ENCOUNTER
Routing refill request to provider for review/approval because:  Labs not current:  ACT  Last Filled 1/7/19      Iris Blackwell RN

## 2020-09-15 ENCOUNTER — TELEPHONE (OUTPATIENT)
Dept: FAMILY MEDICINE | Facility: CLINIC | Age: 25
End: 2020-09-15

## 2020-09-15 DIAGNOSIS — F90.0 ATTENTION DEFICIT HYPERACTIVITY DISORDER (ADHD), PREDOMINANTLY INATTENTIVE TYPE: ICD-10-CM

## 2020-09-15 RX ORDER — DEXTROAMPHETAMINE SACCHARATE, AMPHETAMINE ASPARTATE MONOHYDRATE, DEXTROAMPHETAMINE SULFATE AND AMPHETAMINE SULFATE 7.5; 7.5; 7.5; 7.5 MG/1; MG/1; MG/1; MG/1
30 CAPSULE, EXTENDED RELEASE ORAL DAILY
Qty: 30 CAPSULE | Refills: 0 | Status: SHIPPED | OUTPATIENT
Start: 2020-09-15 | End: 2020-10-15

## 2020-09-15 NOTE — TELEPHONE ENCOUNTER
Reason for Call:  Medication or medication refill:    Do you use a [facility]?  Name of the pharmacy and phone number for the current request:  [facility] [place]    Name of the medication requested: amphetamine-dextroamphetamine (ADDERALL XR)    Other request: N/A    Can we leave a detailed message on this number? YES    Phone number patient can be reached at: Home number on file [phone] (home)    Best Time: Anytime     Call taken on 9/15/2020 at 8:21 AM by Margaret Lawrence

## 2020-09-15 NOTE — TELEPHONE ENCOUNTER
Forwarding refill request for Adderall to PCP.  Rx pended.  Last was 8/14/20 for a month worth.  Last visit was virtual on 5/6/20 with recommendation for call in 3 months and office f/u in 6 months.      Siri Sparks RN

## 2020-10-15 DIAGNOSIS — F90.0 ATTENTION DEFICIT HYPERACTIVITY DISORDER (ADHD), PREDOMINANTLY INATTENTIVE TYPE: ICD-10-CM

## 2020-10-15 RX ORDER — DEXTROAMPHETAMINE SACCHARATE, AMPHETAMINE ASPARTATE MONOHYDRATE, DEXTROAMPHETAMINE SULFATE AND AMPHETAMINE SULFATE 7.5; 7.5; 7.5; 7.5 MG/1; MG/1; MG/1; MG/1
CAPSULE, EXTENDED RELEASE ORAL
Qty: 30 CAPSULE | Refills: 0 | Status: SHIPPED | OUTPATIENT
Start: 2020-10-15 | End: 2020-11-10

## 2020-10-15 NOTE — TELEPHONE ENCOUNTER
Routing refill request to provider for review/approval because:  Drug not on the [facility] refill protocol       Iris Blackwell RN

## 2020-10-15 NOTE — TELEPHONE ENCOUNTER
Patient calling in to state that he is out of his Adderall medication. He only has one pill left.  The [facility].  Call him if any questions or problems with his renewal:  [phone].  Thank  You.

## 2020-10-27 NOTE — TELEPHONE ENCOUNTER
Called patient and left a VM message to call the clinic. Please schedule a physical/ med check.    Dana Victor

## 2020-11-10 ENCOUNTER — VIRTUAL VISIT (OUTPATIENT)
Dept: FAMILY MEDICINE | Facility: CLINIC | Age: 25
End: 2020-11-10
Payer: COMMERCIAL

## 2020-11-10 DIAGNOSIS — J45.990 EXERCISE-INDUCED ASTHMA: ICD-10-CM

## 2020-11-10 DIAGNOSIS — F90.0 ATTENTION DEFICIT HYPERACTIVITY DISORDER (ADHD), PREDOMINANTLY INATTENTIVE TYPE: Primary | ICD-10-CM

## 2020-11-10 DIAGNOSIS — F19.982 DRUG-INDUCED INSOMNIA (H): ICD-10-CM

## 2020-11-10 PROCEDURE — 99214 OFFICE O/P EST MOD 30 MIN: CPT | Mod: 95 | Performed by: FAMILY MEDICINE

## 2020-11-10 RX ORDER — HYDROXYZINE PAMOATE 25 MG/1
25 CAPSULE ORAL
Qty: 180 CAPSULE | Refills: 1 | Status: SHIPPED | OUTPATIENT
Start: 2020-11-10 | End: 2022-04-25

## 2020-11-10 RX ORDER — DEXTROAMPHETAMINE SACCHARATE, AMPHETAMINE ASPARTATE MONOHYDRATE, DEXTROAMPHETAMINE SULFATE AND AMPHETAMINE SULFATE 7.5; 7.5; 7.5; 7.5 MG/1; MG/1; MG/1; MG/1
30 CAPSULE, EXTENDED RELEASE ORAL DAILY
Qty: 30 CAPSULE | Refills: 0 | Status: SHIPPED | OUTPATIENT
Start: 2020-12-15 | End: 2021-02-28

## 2020-11-10 RX ORDER — DEXTROAMPHETAMINE SACCHARATE, AMPHETAMINE ASPARTATE MONOHYDRATE, DEXTROAMPHETAMINE SULFATE AND AMPHETAMINE SULFATE 7.5; 7.5; 7.5; 7.5 MG/1; MG/1; MG/1; MG/1
CAPSULE, EXTENDED RELEASE ORAL
Qty: 30 CAPSULE | Refills: 0 | Status: SHIPPED | OUTPATIENT
Start: 2020-11-13 | End: 2021-03-01

## 2020-11-10 RX ORDER — DEXTROAMPHETAMINE SACCHARATE, AMPHETAMINE ASPARTATE MONOHYDRATE, DEXTROAMPHETAMINE SULFATE AND AMPHETAMINE SULFATE 7.5; 7.5; 7.5; 7.5 MG/1; MG/1; MG/1; MG/1
30 CAPSULE, EXTENDED RELEASE ORAL DAILY
Qty: 30 CAPSULE | Refills: 0 | Status: SHIPPED | OUTPATIENT
Start: 2021-01-15 | End: 2021-03-01

## 2020-11-10 ASSESSMENT — ASTHMA QUESTIONNAIRES
QUESTION_4 LAST FOUR WEEKS HOW OFTEN HAVE YOU USED YOUR RESCUE INHALER OR NEBULIZER MEDICATION (SUCH AS ALBUTEROL): ONE OR TWO TIMES PER DAY
QUESTION_1 LAST FOUR WEEKS HOW MUCH OF THE TIME DID YOUR ASTHMA KEEP YOU FROM GETTING AS MUCH DONE AT WORK, SCHOOL OR AT HOME: SOME OF THE TIME
QUESTION_3 LAST FOUR WEEKS HOW OFTEN DID YOUR ASTHMA SYMPTOMS (WHEEZING, COUGHING, SHORTNESS OF BREATH, CHEST TIGHTNESS OR PAIN) WAKE YOU UP AT NIGHT OR EARLIER THAN USUAL IN THE MORNING: NOT AT ALL
QUESTION_5 LAST FOUR WEEKS HOW WOULD YOU RATE YOUR ASTHMA CONTROL: COMPLETELY CONTROLLED
QUESTION_2 LAST FOUR WEEKS HOW OFTEN HAVE YOU HAD SHORTNESS OF BREATH: NOT AT ALL
ACT_TOTALSCORE: 20

## 2020-11-10 NOTE — PROGRESS NOTES
"Patrick Gutierrez is a 25 year old male who is being evaluated via a billable telephone visit.      The patient has been notified of following:     \"This telephone visit will be conducted via a call between you and your physician/provider. We have found that certain health care needs can be provided without the need for a physical exam.  This service lets us provide the care you need with a short phone conversation.  If a prescription is necessary we can send it directly to your pharmacy.  If lab work is needed we can place an order for that and you can then stop by our lab to have the test done at a later time.    Telephone visits are billed at different rates depending on your insurance coverage. During this emergency period, for some insurers they may be billed the same as an in-person visit.  Please reach out to your insurance provider with any questions.    If during the course of the call the physician/provider feels a telephone visit is not appropriate, you will not be charged for this service.\"    Patient has given verbal consent for Telephone visit?  Yes    What phone number would you like to be contacted at? [phone]    How would you like to obtain your AVS? Mail a copy    Subjective     Patrick Gutierrez is a 25 year old male who presents via phone visit today for the following health issues:    HPI     Medication Followup of ADHD (Adderall)     Taking Medication as prescribed: yes    Side Effects:  None    Medication Helping Symptoms:  yes   No weight loss taking it daily  No Appitite changes  Sleep is ok now  Anxiety none    Now graduating from school now working ok and is ok      Asthma doing well-mask , albuterol  Insomnia due to meds    New Patient/Transfer of Care    Review of Systems   Constitutional, HEENT, cardiovascular, pulmonary, GI, , musculoskeletal, neuro, skin, endocrine and psych systems are negative, except as otherwise noted.       Objective          Vitals:  No vitals were obtained " today due to virtual visit.    healthy, alert and no distress  PSYCH: Alert and oriented times 3; coherent speech, normal   rate and volume, able to articulate logical thoughts, able   to abstract reason, no tangential thoughts, no hallucinations   or delusions  His affect is normal  RESP: No cough, no audible wheezing, able to talk in full sentences  Remainder of exam unable to be completed due to telephone visits    No results found for this or any previous visit (from the past 24 hour(s)).        Assessment/Plan:    ICD-10-CM    1. Attention deficit hyperactivity disorder (ADHD), predominantly inattentive type  F90.0    2. Exercise-induced asthma  J45.990    3. Drug-induced insomnia (H)  F19.982 hydrOXYzine (VISTARIL) 25 MG capsule   adhd-stable,  Refilled  Asthma-mild, only on albuterol stable  Insomnia may be due to med, advised taking adderall early, take vistaril prn     Phone call duration:  11 minutes

## 2020-11-11 ASSESSMENT — ASTHMA QUESTIONNAIRES: ACT_TOTALSCORE: 20

## 2021-02-26 DIAGNOSIS — F90.0 ATTENTION DEFICIT HYPERACTIVITY DISORDER (ADHD), PREDOMINANTLY INATTENTIVE TYPE: ICD-10-CM

## 2021-02-26 NOTE — TELEPHONE ENCOUNTER
Routing refill request to provider for review/approval because:  Drug not on the [facility] refill protocol     Maggy Young RN

## 2021-02-26 NOTE — TELEPHONE ENCOUNTER
Reason for Call:  Medication or medication refill:    Do you use a [facility]?  Name of the pharmacy and phone number for the current request:  [place] [place]    Name of the medication requested: Adderall 30 mg    Other request: N/A    Can we leave a detailed message on this number? YES    Phone number patient can be reached at: Home number on file [phone] (home)    Best Time: Anytime    Call taken on 2/26/2021 at 10:31 AM by Cassandra Rico

## 2021-03-01 RX ORDER — DEXTROAMPHETAMINE SACCHARATE, AMPHETAMINE ASPARTATE MONOHYDRATE, DEXTROAMPHETAMINE SULFATE AND AMPHETAMINE SULFATE 7.5; 7.5; 7.5; 7.5 MG/1; MG/1; MG/1; MG/1
CAPSULE, EXTENDED RELEASE ORAL
Qty: 30 CAPSULE | Refills: 0 | Status: SHIPPED | OUTPATIENT
Start: 2021-03-01 | End: 2021-04-14

## 2021-03-01 RX ORDER — DEXTROAMPHETAMINE SACCHARATE, AMPHETAMINE ASPARTATE MONOHYDRATE, DEXTROAMPHETAMINE SULFATE AND AMPHETAMINE SULFATE 7.5; 7.5; 7.5; 7.5 MG/1; MG/1; MG/1; MG/1
30 CAPSULE, EXTENDED RELEASE ORAL DAILY
Qty: 30 CAPSULE | Refills: 0 | Status: SHIPPED | OUTPATIENT
Start: 2021-05-01 | End: 2021-04-15

## 2021-03-01 RX ORDER — DEXTROAMPHETAMINE SACCHARATE, AMPHETAMINE ASPARTATE MONOHYDRATE, DEXTROAMPHETAMINE SULFATE AND AMPHETAMINE SULFATE 7.5; 7.5; 7.5; 7.5 MG/1; MG/1; MG/1; MG/1
30 CAPSULE, EXTENDED RELEASE ORAL DAILY
Qty: 30 CAPSULE | Refills: 0 | Status: SHIPPED | OUTPATIENT
Start: 2021-04-01 | End: 2021-04-15

## 2021-04-15 ENCOUNTER — OFFICE VISIT (OUTPATIENT)
Dept: FAMILY MEDICINE | Facility: CLINIC | Age: 26
End: 2021-04-15
Payer: COMMERCIAL

## 2021-04-15 VITALS
WEIGHT: 169 LBS | DIASTOLIC BLOOD PRESSURE: 68 MMHG | HEART RATE: 75 BPM | HEIGHT: 70 IN | BODY MASS INDEX: 24.2 KG/M2 | SYSTOLIC BLOOD PRESSURE: 124 MMHG | OXYGEN SATURATION: 100 % | RESPIRATION RATE: 16 BRPM | TEMPERATURE: 98.1 F

## 2021-04-15 DIAGNOSIS — J45.990 EXERCISE-INDUCED ASTHMA: ICD-10-CM

## 2021-04-15 DIAGNOSIS — Z11.3 SCREEN FOR STD (SEXUALLY TRANSMITTED DISEASE): ICD-10-CM

## 2021-04-15 DIAGNOSIS — Z00.00 ROUTINE GENERAL MEDICAL EXAMINATION AT A HEALTH CARE FACILITY: Primary | ICD-10-CM

## 2021-04-15 DIAGNOSIS — F90.0 ATTENTION DEFICIT HYPERACTIVITY DISORDER (ADHD), PREDOMINANTLY INATTENTIVE TYPE: ICD-10-CM

## 2021-04-15 LAB
HCV AB SERPL QL IA: NONREACTIVE
HIV 1+2 AB+HIV1 P24 AG SERPL QL IA: NONREACTIVE
T PALLIDUM AB SER QL: NONREACTIVE

## 2021-04-15 PROCEDURE — 99395 PREV VISIT EST AGE 18-39: CPT | Performed by: FAMILY MEDICINE

## 2021-04-15 PROCEDURE — 87389 HIV-1 AG W/HIV-1&-2 AB AG IA: CPT | Performed by: FAMILY MEDICINE

## 2021-04-15 PROCEDURE — 86780 TREPONEMA PALLIDUM: CPT | Mod: 90 | Performed by: FAMILY MEDICINE

## 2021-04-15 PROCEDURE — 87591 N.GONORRHOEAE DNA AMP PROB: CPT | Performed by: FAMILY MEDICINE

## 2021-04-15 PROCEDURE — 99000 SPECIMEN HANDLING OFFICE-LAB: CPT | Performed by: FAMILY MEDICINE

## 2021-04-15 PROCEDURE — 36415 COLL VENOUS BLD VENIPUNCTURE: CPT | Performed by: FAMILY MEDICINE

## 2021-04-15 PROCEDURE — 87491 CHLMYD TRACH DNA AMP PROBE: CPT | Performed by: FAMILY MEDICINE

## 2021-04-15 PROCEDURE — 86803 HEPATITIS C AB TEST: CPT | Performed by: FAMILY MEDICINE

## 2021-04-15 RX ORDER — DEXTROAMPHETAMINE SACCHARATE, AMPHETAMINE ASPARTATE MONOHYDRATE, DEXTROAMPHETAMINE SULFATE AND AMPHETAMINE SULFATE 7.5; 7.5; 7.5; 7.5 MG/1; MG/1; MG/1; MG/1
30 CAPSULE, EXTENDED RELEASE ORAL DAILY
Qty: 30 CAPSULE | Refills: 0 | Status: SHIPPED | OUTPATIENT
Start: 2021-06-01 | End: 2021-09-01

## 2021-04-15 RX ORDER — DEXTROAMPHETAMINE SACCHARATE, AMPHETAMINE ASPARTATE MONOHYDRATE, DEXTROAMPHETAMINE SULFATE AND AMPHETAMINE SULFATE 7.5; 7.5; 7.5; 7.5 MG/1; MG/1; MG/1; MG/1
30 CAPSULE, EXTENDED RELEASE ORAL DAILY
Qty: 30 CAPSULE | Refills: 0 | Status: SHIPPED | OUTPATIENT
Start: 2021-07-01 | End: 2021-09-01

## 2021-04-15 RX ORDER — DEXTROAMPHETAMINE SACCHARATE, AMPHETAMINE ASPARTATE MONOHYDRATE, DEXTROAMPHETAMINE SULFATE AND AMPHETAMINE SULFATE 7.5; 7.5; 7.5; 7.5 MG/1; MG/1; MG/1; MG/1
30 CAPSULE, EXTENDED RELEASE ORAL DAILY
Qty: 30 CAPSULE | Refills: 0 | Status: SHIPPED | OUTPATIENT
Start: 2021-08-02 | End: 2021-09-01

## 2021-04-15 ASSESSMENT — ENCOUNTER SYMPTOMS
HEMATURIA: 0
HEMATOCHEZIA: 0
HEARTBURN: 0
COUGH: 0
NAUSEA: 0
DIZZINESS: 0
JOINT SWELLING: 1
ARTHRALGIAS: 1
MYALGIAS: 1
WEAKNESS: 0
CONSTIPATION: 0
EYE PAIN: 0
ABDOMINAL PAIN: 0
PARESTHESIAS: 0
DYSURIA: 0
SORE THROAT: 0
HEADACHES: 0
CHILLS: 0
PALPITATIONS: 0
DIARRHEA: 0
FEVER: 0
FREQUENCY: 0
NERVOUS/ANXIOUS: 0
SHORTNESS OF BREATH: 0

## 2021-04-15 ASSESSMENT — MIFFLIN-ST. JEOR: SCORE: 1757.83

## 2021-04-15 NOTE — PROGRESS NOTES
SUBJECTIVE:   CC: Patrick Gutierrez is an 25 year old male who presents for preventative health visit.       Patient has been advised of split billing requirements and indicates understanding: Yes  Healthy Habits:     Getting at least 3 servings of Calcium per day:  Yes    Bi-annual eye exam:  Yes    Dental care twice a year:  NO    Sleep apnea or symptoms of sleep apnea:  None    Diet:  Gluten-free/reduced    Frequency of exercise:  6-7 days/week    Duration of exercise:  Greater than 60 minutes    Taking medications regularly:  Yes    PHQ-2 Total Score: 0    Additional concerns today:  No    Graduated, living alone    He did notice a change in his bottle of medication and and increase in price the last time he picked up, does not notice any change in effectiveness  Medication Followup of Adderall    Taking Medication as prescribed: yes    Side Effects:  None    Medication Helping Symptoms:  yes     Asthma Follow-Up    Was ACT completed today?    Yes    ACT Total Scores 4/15/2021   ACT TOTAL SCORE (Goal Greater than or Equal to 20) 20   In the past 12 months, how many times did you visit the emergency room for your asthma without being admitted to the hospital? 0   In the past 12 months, how many times were you hospitalized overnight because of your asthma? 0          How many days per week do you miss taking your asthma controller medication?  I do not have an asthma controller medication    Please describe any recent triggers for your asthma: None    Have you had any Emergency Room Visits, Urgent Care Visits, or Hospital Admissions since your last office visit?  No      Today's PHQ-2 Score:   PHQ-2 ( 1999 [employer]) 4/15/2021   Q1: Little interest or pleasure in doing things 0   Q2: Feeling down, depressed or hopeless 0   PHQ-2 Score 0   Q1: Little interest or pleasure in doing things Not at all   Q2: Feeling down, depressed or hopeless Not at all   PHQ-2 Score 0       Abuse: Current or Past(Physical, Sexual or  Emotional)- No  Do you feel safe in your environment? Yes    Have you ever done Advance Care Planning? (For example, a Health Directive, POLST, or a discussion with a medical provider or your loved ones about your wishes): No, advance care planning information given to patient to review.  Patient plans to discuss their wishes with loved ones or provider.      Social History     Tobacco Use     Smoking status: Never Smoker     Smokeless tobacco: Never Used   Substance Use Topics     Alcohol use: No         Alcohol Use 4/15/2021   Prescreen: >3 drinks/day or >7 drinks/week? Yes   AUDIT SCORE  7       Last PSA: No results found for: PSA    Reviewed orders with patient. Reviewed health maintenance and updated orders accordingly - Yes  BP Readings from Last 3 Encounters:   04/15/21 124/68   04/24/20 128/64   11/05/19 119/82    Wt Readings from Last 3 Encounters:   04/15/21 76.7 kg (169 lb)   04/24/20 73.5 kg (162 lb)   11/05/19 68 kg (150 lb)                    Reviewed and updated as needed this visit by clinical staff  Tobacco  Allergies    Med Hx  Surg Hx  Fam Hx  Soc Hx        Reviewed and updated as needed this visit by Provider                Past Medical History:   Diagnosis Date     Kidney stone 2018    Left      History reviewed. No pertinent surgical history.  OB History   No obstetric history on file.       Review of Systems   Constitutional: Negative for chills and fever.   HENT: Negative for congestion, ear pain, hearing loss and sore throat.    Eyes: Negative for pain and visual disturbance.   Respiratory: Negative for cough and shortness of breath.    Cardiovascular: Positive for peripheral edema. Negative for chest pain and palpitations.   Gastrointestinal: Negative for abdominal pain, constipation, diarrhea, heartburn, hematochezia and nausea.   Genitourinary: Negative for discharge, dysuria, frequency, genital sores, hematuria, impotence and urgency.   Musculoskeletal: Positive for arthralgias,  "joint swelling and myalgias.   Skin: Negative for rash.   Neurological: Negative for dizziness, weakness, headaches and paresthesias.   Psychiatric/Behavioral: Negative for mood changes. The patient is not nervous/anxious.      CONSTITUTIONAL: NEGATIVE for fever, chills, change in weight  INTEGUMENTARY/SKIN: NEGATIVE for worrisome rashes, moles or lesions  EYES: NEGATIVE for vision changes or irritation  ENT: NEGATIVE for ear, mouth and throat problems  RESP: NEGATIVE for significant cough or SOB  CV: NEGATIVE for chest pain, palpitations or peripheral edema  GI: NEGATIVE for nausea, abdominal pain, heartburn, or change in bowel habits   male: negative for dysuria, hematuria, decreased urinary stream, erectile dysfunction, urethral discharge  MUSCULOSKELETAL: NEGATIVE for significant arthralgias or myalgia  NEURO: NEGATIVE for weakness, dizziness or paresthesias  PSYCHIATRIC: NEGATIVE for changes in mood or affect    OBJECTIVE:   /68   Pulse 75   Temp 98.1  F (36.7  C) (Oral)   Resp 16   Ht 1.778 m (5' 10\")   Wt 76.7 kg (169 lb)   SpO2 100%   BMI 24.25 kg/m      Physical Exam  GENERAL: healthy, alert and no distress  EYES: Eyes grossly normal to inspection, PERRL and conjunctivae and sclerae normal  HENT: ear canals and TM's normal, nose and mouth without ulcers or lesions  NECK: no adenopathy, no asymmetry, masses, or scars and thyroid normal to palpation  RESP: lungs clear to auscultation - no rales, rhonchi or wheezes  CV: regular rate and rhythm, normal S1 S2, no S3 or S4, no murmur, click or rub, no peripheral edema and peripheral pulses strong  ABDOMEN: soft, nontender, no hepatosplenomegaly, no masses and bowel sounds normal  MS: no gross musculoskeletal defects noted, no edema  SKIN: no suspicious lesions or rashes  NEURO: Normal strength and tone, mentation intact and speech normal  PSYCH: mentation appears normal, affect normal/bright    Diagnostic Test Results:  Labs reviewed in " "Epic    ASSESSMENT/PLAN:       ICD-10-CM    1. Routine general medical examination at a health care facility  Z00.00    2. Attention deficit hyperactivity disorder (ADHD), predominantly inattentive type  F90.0 amphetamine-dextroamphetamine (ADDERALL XR) 30 MG 24 hr capsule     amphetamine-dextroamphetamine (ADDERALL XR) 30 MG 24 hr capsule     amphetamine-dextroamphetamine (ADDERALL XR) 30 MG 24 hr capsule   3. Exercise-induced asthma  J45.990    4. Screen for STD (sexually transmitted disease)  Z11.3 HIV Antigen Antibody Combo     Hepatitis C antibody     Chlamydia trachomatis PCR     Neisseria gonorrhoeae PCR     Treponema Abs w Reflex to RPR and Titer     Get labs done  ADHD-stable, expensive due to insurance not covering it, Check insurance about coverage, refilled now  Asthma-stable, cont inhalers  Patient has been advised of split billing requirements and indicates understanding: Yes  COUNSELING:   Reviewed preventive health counseling, as reflected in patient instructions    Estimated body mass index is 24.25 kg/m  as calculated from the following:    Height as of this encounter: 1.778 m (5' 10\").    Weight as of this encounter: 76.7 kg (169 lb).         He reports that he has never smoked. He has never used smokeless tobacco.      Counseling Resources:  ATP IV Guidelines  Pooled Cohorts Equation Calculator  FRAX Risk Assessment  ICSI Preventive Guidelines  Dietary Guidelines for Americans, 2010  USDA's MyPlate  ASA Prophylaxis  Lung CA Screening    DO ADAIR Dhillon [facility]  "

## 2021-04-15 NOTE — PATIENT INSTRUCTIONS
Get labs done  Check insurance about coverage    Patient Education       Preventive Health Recommendations  Male Ages 21 - 25     Yearly exam:             See your health care provider every year in order to  o   Review health changes.   o   Discuss preventive care.    o   Review your medicines if your doctor has prescribed any.    You should be tested each year for STDs (sexually transmitted diseases).     Talk to your provider about cholesterol testing.      If you are at risk for diabetes, you should have a diabetes test (fasting glucose).    Shots: Get a flu shot each year. Get a tetanus shot every 10 years.     Nutrition:    Eat at least 5 servings of fruits and vegetables daily.     Eat whole-grain bread, whole-wheat pasta and brown rice instead of white grains and rice.     Get adequate calcium and Vitamin D.     Lifestyle    Exercise for at least 150 minutes a week (30 minutes a day, 5 days a week). This will help you control your weight and prevent disease.     Limit alcohol to one drink per day.     No smoking.     Wear sunscreen to prevent skin cancer.     See your dentist every six months for an exam and cleaning.

## 2021-04-16 LAB
C TRACH DNA SPEC QL NAA+PROBE: NEGATIVE
N GONORRHOEA DNA SPEC QL NAA+PROBE: NEGATIVE
SPECIMEN SOURCE: NORMAL
SPECIMEN SOURCE: NORMAL

## 2021-04-16 ASSESSMENT — ASTHMA QUESTIONNAIRES: ACT_TOTALSCORE: 20

## 2021-04-19 NOTE — RESULT ENCOUNTER NOTE
All your results are essentially kareem. Please contact me if you have any questions.  Take care,  Katt Meredith D.O.

## 2021-06-01 NOTE — TELEPHONE ENCOUNTER
GAURAV WILLS    Patient Age: 48 year old   Interpreting service used: No    Patient seen within 1 year by a provider in primary care? Yes-      Refill to be: ePrescribed    Medication requested to be refilled: See pended medications.    Addition Information:     Does patient have enough to medication for 72 business hours? No- Route message to provider clinical pool- HIGH PRIORITY    Caller informed to check with the pharmacy later for their refill.  If problems arise, we will contact patient.         WEIGHT AND HEIGHT:   Wt Readings from Last 1 Encounters:   04/05/21 117.2 kg (258 lb 6.4 oz)     Ht Readings from Last 1 Encounters:   04/05/21 6' (1.829 m)     BMI Readings from Last 1 Encounters:   04/05/21 35.05 kg/m²       ALLERGIES:  Patient has no known allergies.  Current Outpatient Medications   Medication   • losartan (COZAAR) 100 MG tablet   • levothyroxine 100 MCG tablet   • cyclobenzaprine (FLEXERIL) 10 MG tablet   • citalopram (CeleXA) 20 MG tablet   • atorvastatin (LIPITOR) 20 MG tablet   • buPROPion XL (Wellbutrin XL) 150 MG 24 hr tablet     No current facility-administered medications for this visit.         CALL BACK INFO: Ok to leave response (including medical information) with family member or on answering machine        PCP: Joseline Hawk MD         INS: Payor: [employer] / Plan: PPO [id number] / Product Type: PPO MISC   PATIENT ADDRESS:  [address] [place] [id number]     Called patient and left a VM message that we need his prior clinics medical records so that Dr Meredith can prescribe his ADHD medication.  Told patient he did sign a Release of Information but we never received the medical records.    Dana Victor

## 2021-07-21 ENCOUNTER — NURSE TRIAGE (OUTPATIENT)
Dept: FAMILY MEDICINE | Facility: CLINIC | Age: 26
End: 2021-07-21

## 2021-07-21 NOTE — TELEPHONE ENCOUNTER
"CARE ADVICE:  BE SEEN TODAY.    Patient said yes to cough/SOB.  Failed travel screen.    Patient referred to [facility]          Patient stated he injured his knee about 1 week agowhile at work.    Pain became more severe yesterday.    Unable to bear weight on his leg.  Hobbling along when walking.    Patient did not pass travel screen so referred to [facility] care          Reason for Disposition    SEVERE pain (e.g., excruciating)    Additional Information    Negative: Major bleeding (actively dripping or spurting) that can't be stopped    Negative: Bullet, stabbed by knife or other serious penetrating wound    Negative: Looks like a dislocated joint (crooked or deformed)    Negative: Serious injury with multiple fractures (broken bones)    Negative: Sounds like a life-threatening emergency to the triager    Negative: Wound looks infected    Negative: Looks infected (e.g., spreading redness, pus, red streak)    Answer Assessment - Initial Assessment Questions  1. 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  Tripped on bottom of stairs on knees left knee.  Kower back tweeked  2. ONSET: \"When did the injury happen?\" (Minutes or hours ago)       About 1 week ago.  Became more painful yesturday  3. LOCATION: \"Where is the injury located?\"       Left knee, lower back  4. APPEARANCE of INJURY: \"What does the injury look like?\"       Swollen, red, painful  5. SEVERITY: \"Can you put weight on that leg?\" \"Can you walk?\"      Cannot bear weight on.  Hobbling along so as not to bear wieight    6. SIZE: For cuts, bruises, or swelling, ask: \"How large is it?\" (e.g., inches or centimeters;  entire joint)       none  7. PAIN: \"Is there pain?\" If so, ask: \"How bad is the pain?\"    (e.g., Scale 1-10; or mild, moderate, severe)      8/10 walking, throbbing thru the night, bending  8. TETANUS: For any breaks in the skin, ask: \"When was the last " "tetanus booster?\"      NA  9. OTHER SYMPTOMS: \"Do you have any other symptoms?\"  (e.g., \"pop\" when knee injured, swelling, locking, buckling)       No pop quite swollen  10. PREGNANCY: \"Is there any chance you are pregnant?\" \"When was your last menstrual period?\"        NA    Protocols used: KNEE INJURY-A-OH        Artemio Stevenson RN, BSN, PHN  [facility]  "

## 2021-08-31 DIAGNOSIS — F90.0 ATTENTION DEFICIT HYPERACTIVITY DISORDER (ADHD), PREDOMINANTLY INATTENTIVE TYPE: ICD-10-CM

## 2021-09-01 RX ORDER — DEXTROAMPHETAMINE SACCHARATE, AMPHETAMINE ASPARTATE MONOHYDRATE, DEXTROAMPHETAMINE SULFATE AND AMPHETAMINE SULFATE 7.5; 7.5; 7.5; 7.5 MG/1; MG/1; MG/1; MG/1
30 CAPSULE, EXTENDED RELEASE ORAL DAILY
Qty: 30 CAPSULE | Refills: 0 | Status: SHIPPED | OUTPATIENT
Start: 2021-09-01 | End: 2021-12-21

## 2021-09-01 RX ORDER — DEXTROAMPHETAMINE SACCHARATE, AMPHETAMINE ASPARTATE MONOHYDRATE, DEXTROAMPHETAMINE SULFATE AND AMPHETAMINE SULFATE 7.5; 7.5; 7.5; 7.5 MG/1; MG/1; MG/1; MG/1
30 CAPSULE, EXTENDED RELEASE ORAL DAILY
Qty: 30 CAPSULE | Refills: 0 | Status: SHIPPED | OUTPATIENT
Start: 2021-11-01 | End: 2021-12-21

## 2021-09-01 RX ORDER — DEXTROAMPHETAMINE SACCHARATE, AMPHETAMINE ASPARTATE MONOHYDRATE, DEXTROAMPHETAMINE SULFATE AND AMPHETAMINE SULFATE 7.5; 7.5; 7.5; 7.5 MG/1; MG/1; MG/1; MG/1
30 CAPSULE, EXTENDED RELEASE ORAL DAILY
Qty: 30 CAPSULE | Refills: 0 | Status: SHIPPED | OUTPATIENT
Start: 2021-10-01 | End: 2021-12-21

## 2021-09-01 NOTE — TELEPHONE ENCOUNTER
Routing refill request to provider for review/approval because:  Drug not on the [facility] refill protocol           Pending Prescriptions:                       Disp   Refills    amphetamine-dextroamphetamine (ADDERALL XR*30 cap*0        Sig: Take 1 capsule (30 mg) by mouth daily    amphetamine-dextroamphetamine (ADDERALL XR*30 cap*0        Sig: Take 1 capsule (30 mg) by mouth daily    amphetamine-dextroamphetamine (ADDERALL XR*30 cap*0        Sig: Take 1 capsule (30 mg) by mouth daily        Luis Daniel Rosales RN

## 2021-11-15 ENCOUNTER — TELEPHONE (OUTPATIENT)
Dept: FAMILY MEDICINE | Facility: CLINIC | Age: 26
End: 2021-11-15
Payer: COMMERCIAL

## 2021-11-15 DIAGNOSIS — F90.0 ATTENTION DEFICIT HYPERACTIVITY DISORDER (ADHD), PREDOMINANTLY INATTENTIVE TYPE: ICD-10-CM

## 2021-11-15 RX ORDER — DEXTROAMPHETAMINE SACCHARATE, AMPHETAMINE ASPARTATE MONOHYDRATE, DEXTROAMPHETAMINE SULFATE AND AMPHETAMINE SULFATE 7.5; 7.5; 7.5; 7.5 MG/1; MG/1; MG/1; MG/1
30 CAPSULE, EXTENDED RELEASE ORAL DAILY
Qty: 30 CAPSULE | Refills: 0 | Status: CANCELLED | OUTPATIENT
Start: 2021-11-15

## 2021-11-15 NOTE — TELEPHONE ENCOUNTER
Reason for Call:  Other     Detailed comments: Pt is wondering if he is due for a checkup and has 6 pills of his adderall left.     Phone Number Patient can be reached at: Cell number on file:    Telephone Information:   Mobile [phone]       Best Time: any    Can we leave a detailed message on this number? YES    Call taken on 11/15/2021 at 8:31 AM by Gloria Vazquez

## 2021-11-15 NOTE — TELEPHONE ENCOUNTER
There was a script sent 11/1,  is not showing it that it was filled.    Please call and ask about that script , if filled should have enough until 12/1  Katt Meredith D.O.

## 2021-11-15 NOTE — TELEPHONE ENCOUNTER
Routing to PCP- see phone call     Called pt due for clinic f/u per last visit note. Pt schedule to be seen 12/7/21.    Okay to refill Adderall till visit.    Rx pending approval if appropriate    Maggy Young RN

## 2021-11-15 NOTE — TELEPHONE ENCOUNTER
Called and left message for patient to call pharmacy to fill last script on file.  Can call clinic if any quesitons    Marcelina Yoon RN

## 2021-12-21 ENCOUNTER — VIRTUAL VISIT (OUTPATIENT)
Dept: FAMILY MEDICINE | Facility: CLINIC | Age: 26
End: 2021-12-21
Payer: COMMERCIAL

## 2021-12-21 DIAGNOSIS — F90.0 ATTENTION DEFICIT HYPERACTIVITY DISORDER (ADHD), PREDOMINANTLY INATTENTIVE TYPE: Primary | ICD-10-CM

## 2021-12-21 DIAGNOSIS — J45.990 EXERCISE-INDUCED ASTHMA: ICD-10-CM

## 2021-12-21 PROCEDURE — 99213 OFFICE O/P EST LOW 20 MIN: CPT | Mod: 95 | Performed by: FAMILY MEDICINE

## 2021-12-21 RX ORDER — DEXTROAMPHETAMINE SACCHARATE, AMPHETAMINE ASPARTATE MONOHYDRATE, DEXTROAMPHETAMINE SULFATE AND AMPHETAMINE SULFATE 7.5; 7.5; 7.5; 7.5 MG/1; MG/1; MG/1; MG/1
30 CAPSULE, EXTENDED RELEASE ORAL DAILY
Qty: 30 CAPSULE | Refills: 0 | Status: SHIPPED | OUTPATIENT
Start: 2021-12-21 | End: 2022-03-28

## 2021-12-21 RX ORDER — DEXTROAMPHETAMINE SACCHARATE, AMPHETAMINE ASPARTATE MONOHYDRATE, DEXTROAMPHETAMINE SULFATE AND AMPHETAMINE SULFATE 7.5; 7.5; 7.5; 7.5 MG/1; MG/1; MG/1; MG/1
30 CAPSULE, EXTENDED RELEASE ORAL DAILY
Qty: 30 CAPSULE | Refills: 0 | Status: SHIPPED | OUTPATIENT
Start: 2022-02-21 | End: 2022-06-02

## 2021-12-21 RX ORDER — DEXTROAMPHETAMINE SACCHARATE, AMPHETAMINE ASPARTATE MONOHYDRATE, DEXTROAMPHETAMINE SULFATE AND AMPHETAMINE SULFATE 7.5; 7.5; 7.5; 7.5 MG/1; MG/1; MG/1; MG/1
30 CAPSULE, EXTENDED RELEASE ORAL DAILY
Qty: 30 CAPSULE | Refills: 0 | Status: SHIPPED | OUTPATIENT
Start: 2022-01-21 | End: 2022-06-02

## 2021-12-21 NOTE — PROGRESS NOTES
Patrick is a 26 year old who is being evaluated via a billable video visit.      How would you like to obtain your AVS? MyChart  If the video visit is dropped, the invitation should be resent by: Text to cell phone: [phone]  Will anyone else be joining your video visit? No    Video Start Time: 8:27 AM    Assessment & Plan       ICD-10-CM    1. Attention deficit hyperactivity disorder (ADHD), predominantly inattentive type  F90.0 amphetamine-dextroamphetamine (ADDERALL XR) 30 MG 24 hr capsule     amphetamine-dextroamphetamine (ADDERALL XR) 30 MG 24 hr capsule     amphetamine-dextroamphetamine (ADDERALL XR) 30 MG 24 hr capsule   2. Exercise-induced asthma  J45.990        ADHD-med working well, no side effects, refilled  Follow up in 6 months    Unvaccinated, recently got covid, mild sx, improving, declined vaccine for now  Asthma -stable, close monitoring      See Patient Instructions    No follow-ups on file.    Katt Meredith DO  [facility]   Patrick is a 26 year old who presents for the following health issues  accompanied by his self.    HPI     Medication Followup of adderall    Taking Medication as prescribed: yes    Side Effects:  None    Medication Helping Symptoms:  yes     No depression, no anxiety    Doing well on meds  No side effects    Go covid few month ago, doing well now, still lack of smell  Unvaccinated  Does not belive in the vaccine   He did get flu shots  Review of Systems   Constitutional, HEENT, cardiovascular, pulmonary, GI, , musculoskeletal, neuro, skin, endocrine and psych systems are negative, except as otherwise noted.      Objective           Vitals:  No vitals were obtained today due to virtual visit.    Physical Exam   GENERAL: Healthy, alert and no distress  EYES: Eyes grossly normal to inspection.  No discharge or erythema, or obvious scleral/conjunctival abnormalities.  RESP: No audible wheeze, cough, or visible cyanosis.  No  visible retractions or increased work of breathing.    SKIN: Visible skin clear. No significant rash, abnormal pigmentation or lesions.  NEURO: Cranial nerves grossly intact.  Mentation and speech appropriate for age.  PSYCH: Mentation appears normal, affect normal/bright, judgement and insight intact, normal speech and appearance well-groomed.                Video-Visit Details    Type of service:  Video Visit    Video End Time:8:33 AM    Originating Location (pt. Location): Home    Distant Location (provider location):  [facility] [place]     Platform used for Video Visit: Tennille

## 2022-04-25 ENCOUNTER — VIRTUAL VISIT (OUTPATIENT)
Dept: FAMILY MEDICINE | Facility: CLINIC | Age: 27
End: 2022-04-25
Payer: COMMERCIAL

## 2022-04-25 DIAGNOSIS — F41.0 PANIC ATTACK: ICD-10-CM

## 2022-04-25 DIAGNOSIS — F41.1 GENERALIZED ANXIETY DISORDER: Primary | ICD-10-CM

## 2022-04-25 PROCEDURE — 99214 OFFICE O/P EST MOD 30 MIN: CPT | Mod: 95 | Performed by: STUDENT IN AN ORGANIZED HEALTH CARE EDUCATION/TRAINING PROGRAM

## 2022-04-25 PROCEDURE — 96127 BRIEF EMOTIONAL/BEHAV ASSMT: CPT | Performed by: STUDENT IN AN ORGANIZED HEALTH CARE EDUCATION/TRAINING PROGRAM

## 2022-04-25 RX ORDER — HYDROXYZINE PAMOATE 25 MG/1
25 CAPSULE ORAL 3 TIMES DAILY PRN
Qty: 30 CAPSULE | Refills: 0 | Status: SHIPPED | OUTPATIENT
Start: 2022-04-25

## 2022-04-25 RX ORDER — ESCITALOPRAM OXALATE 10 MG/1
10 TABLET ORAL DAILY
Qty: 30 TABLET | Refills: 1 | Status: SHIPPED | OUTPATIENT
Start: 2022-04-25 | End: 2022-06-02

## 2022-04-25 ASSESSMENT — ANXIETY QUESTIONNAIRES
1. FEELING NERVOUS, ANXIOUS, OR ON EDGE: NEARLY EVERY DAY
IF YOU CHECKED OFF ANY PROBLEMS ON THIS QUESTIONNAIRE, HOW DIFFICULT HAVE THESE PROBLEMS MADE IT FOR YOU TO DO YOUR WORK, TAKE CARE OF THINGS AT HOME, OR GET ALONG WITH OTHER PEOPLE: VERY DIFFICULT
6. BECOMING EASILY ANNOYED OR IRRITABLE: NEARLY EVERY DAY
2. NOT BEING ABLE TO STOP OR CONTROL WORRYING: NEARLY EVERY DAY
3. WORRYING TOO MUCH ABOUT DIFFERENT THINGS: NEARLY EVERY DAY
5. BEING SO RESTLESS THAT IT IS HARD TO SIT STILL: NEARLY EVERY DAY

## 2022-04-25 ASSESSMENT — PATIENT HEALTH QUESTIONNAIRE - PHQ9
5. POOR APPETITE OR OVEREATING: NEARLY EVERY DAY
SUM OF ALL RESPONSES TO PHQ QUESTIONS 1-9: 9

## 2022-04-25 NOTE — PROGRESS NOTES
Patrick is a 27 year old who is being evaluated via a billable video visit.      How would you like to obtain your AVS? MyChart  If the video visit is dropped, the invitation should be resent by: Send to e-mail at: [email or web address]  Will anyone else be joining your video visit? No      Video Start Time: 5:45 PM    Assessment & Plan     Generalized anxiety disorder  Has been progressively worsening in the past few years. He is interested in starting medication for this. Has previously tried couples counseling, Buddhism, alcohol cessation; no previous medications. Discussed various treatment options, opted for selective serotonin reuptake inhibitor, will start with lexapro 1/2 tab for 1-2 weeks and inc to 10mg daily. Discussed common side effects. Recommend f/up with me or PCP in 4-6 weeks   - escitalopram (LEXAPRO) 10 MG tablet; Take 1 tablet (10 mg) by mouth daily    Panic attack  Will prescribe hydroxyzine to use as needed, discussed sedating properties.   - hydrOXYzine (VISTARIL) 25 MG capsule; Take 1 capsule (25 mg) by mouth 3 times daily as needed for anxiety      Return in about 6 weeks (around 6/6/2022) for Follow up.    Cecilia Arvizu DO  [facility]   Patrick is a 27 year old who presents for the following health issues       HPI       Abnormal Mood Symptoms  Onset/Duration: 5 years  Description:   Depression (if yes, do PHQ-9): no  Anxiety (if yes, do DIANE-7): YES  Accompanying Signs & Symptoms:  Still participating in activities that you used to enjoy: YES  Fatigue: YES  Irritability: YES  Difficulty concentrating: YES   Changes in appetite: no  Problems with sleep: no  Heart racing/beating fast: YES  Abnormally elevated, expansive, or irritable mood: no  Persistently increased activity or energy: no  Thoughts of hurting yourself or others: no  History:   Recent stress or major life event: no  Prior depression or anxiety: None   Family history of depression or  anxiety: no was adopted at age 4  Alcohol/drug use: 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 Difficulty sleeping due to mind racing - started using  Benadryl then now on melatonin which helped a lot. Has had more panic attacks as well, benita in group settings.    In the past never treated for anxiety.     Spiritual trey as a Denominational - has reach into trey more for past 2 years without improvement in anxiety     Has been on Adderall for 2-3 years, and previously was on methylphenidate.     Stopped drinking alcohol     Fiance and him going to couple counseling. Never done individual therapy.       Denies SI, depression, sadness.         PHQ 4/25/2022   PHQ-9 Total Score 9   Q9: Thoughts of better off dead/self-harm past 2 weeks Not at all     No flowsheet data found.            Review of Systems   Constitutional, HEENT, cardiovascular, pulmonary, gi and gu systems are negative, except as otherwise noted.      Objective           Vitals:  No vitals were obtained today due to virtual visit.    Physical Exam   GENERAL: Healthy, alert and no distress  EYES: Eyes grossly normal to inspection.  No discharge or erythema, or obvious scleral/conjunctival abnormalities.  RESP: No audible wheeze, cough, or visible cyanosis.  No visible retractions or increased work of breathing.    SKIN: Visible skin clear. No significant rash, abnormal pigmentation or lesions.  NEURO: Cranial nerves grossly intact.  Mentation and speech appropriate for age.  PSYCH: Mentation appears normal, affect normal/bright, judgement and insight intact, normal speech and appearance well-groomed.        Video-Visit Details    Type of service:  Video Visit    Video End Time:6:05    Originating Location (pt. Location): Home    Distant Location  (provider location):  [facility]     Platform used for Video Visit: Leidy

## 2022-05-26 DIAGNOSIS — F90.0 ATTENTION DEFICIT HYPERACTIVITY DISORDER (ADHD), PREDOMINANTLY INATTENTIVE TYPE: Primary | ICD-10-CM

## 2022-05-26 RX ORDER — DEXTROAMPHETAMINE SACCHARATE, AMPHETAMINE ASPARTATE MONOHYDRATE, DEXTROAMPHETAMINE SULFATE AND AMPHETAMINE SULFATE 7.5; 7.5; 7.5; 7.5 MG/1; MG/1; MG/1; MG/1
CAPSULE, EXTENDED RELEASE ORAL
Qty: 30 CAPSULE | Refills: 0 | Status: SHIPPED | OUTPATIENT
Start: 2022-05-26 | End: 2023-03-07

## 2022-05-26 NOTE — TELEPHONE ENCOUNTER
Routing refill request to provider for review/approval because:  Drug not on the [facility] refill protocol     Lise Connell RN, BSN, PHN  [facility]: [place]

## 2022-05-26 NOTE — TELEPHONE ENCOUNTER
Was seen and was told to recheck anxiety in 4-6 weeks  Please ask patient to make appoint with me soon to discuss meds, can be video visit , I can refill med for a month  Katt Meredith D.O.

## 2022-06-02 ENCOUNTER — VIRTUAL VISIT (OUTPATIENT)
Dept: FAMILY MEDICINE | Facility: CLINIC | Age: 27
End: 2022-06-02
Payer: COMMERCIAL

## 2022-06-02 DIAGNOSIS — F90.0 ATTENTION DEFICIT HYPERACTIVITY DISORDER (ADHD), PREDOMINANTLY INATTENTIVE TYPE: ICD-10-CM

## 2022-06-02 DIAGNOSIS — F41.0 PANIC ATTACK: ICD-10-CM

## 2022-06-02 DIAGNOSIS — F41.1 GENERALIZED ANXIETY DISORDER: ICD-10-CM

## 2022-06-02 PROCEDURE — 99214 OFFICE O/P EST MOD 30 MIN: CPT | Mod: 95 | Performed by: FAMILY MEDICINE

## 2022-06-02 RX ORDER — HYDROXYZINE PAMOATE 25 MG/1
25 CAPSULE ORAL 3 TIMES DAILY PRN
Qty: 30 CAPSULE | Refills: 0 | Status: CANCELLED | OUTPATIENT
Start: 2022-06-02

## 2022-06-02 RX ORDER — DEXTROAMPHETAMINE SACCHARATE, AMPHETAMINE ASPARTATE MONOHYDRATE, DEXTROAMPHETAMINE SULFATE AND AMPHETAMINE SULFATE 7.5; 7.5; 7.5; 7.5 MG/1; MG/1; MG/1; MG/1
30 CAPSULE, EXTENDED RELEASE ORAL DAILY
Qty: 30 CAPSULE | Refills: 0 | Status: SHIPPED | OUTPATIENT
Start: 2022-06-24 | End: 2023-04-25

## 2022-06-02 RX ORDER — DEXTROAMPHETAMINE SACCHARATE, AMPHETAMINE ASPARTATE MONOHYDRATE, DEXTROAMPHETAMINE SULFATE AND AMPHETAMINE SULFATE 7.5; 7.5; 7.5; 7.5 MG/1; MG/1; MG/1; MG/1
30 CAPSULE, EXTENDED RELEASE ORAL DAILY
Qty: 30 CAPSULE | Refills: 0 | Status: SHIPPED | OUTPATIENT
Start: 2022-07-22 | End: 2023-04-25

## 2022-06-02 RX ORDER — DEXTROAMPHETAMINE SACCHARATE, AMPHETAMINE ASPARTATE MONOHYDRATE, DEXTROAMPHETAMINE SULFATE AND AMPHETAMINE SULFATE 7.5; 7.5; 7.5; 7.5 MG/1; MG/1; MG/1; MG/1
30 CAPSULE, EXTENDED RELEASE ORAL DAILY
Qty: 30 CAPSULE | Refills: 0 | Status: SHIPPED | OUTPATIENT
Start: 2022-08-22 | End: 2023-04-25

## 2022-06-02 RX ORDER — ESCITALOPRAM OXALATE 10 MG/1
10 TABLET ORAL DAILY
Qty: 90 TABLET | Refills: 0 | Status: SHIPPED | OUTPATIENT
Start: 2022-06-22 | End: 2022-09-26

## 2022-06-02 NOTE — PROGRESS NOTES
Patrick is a 27 year old who is being evaluated via a billable video visit.      How would you like to obtain your AVS? Mail a copy  If the video visit is dropped, the invitation should be resent by: Text to cell phone: [phone]  Will anyone else be joining your video visit? No      Video Start Time: 12:43 PM      ICD-10-CM    1. Generalized anxiety disorder  F41.1 escitalopram (LEXAPRO) 10 MG tablet   2. Attention deficit hyperactivity disorder (ADHD), predominantly inattentive type  F90.0 amphetamine-dextroamphetamine (ADDERALL XR) 30 MG 24 hr capsule     amphetamine-dextroamphetamine (ADDERALL XR) 30 MG 24 hr capsule     amphetamine-dextroamphetamine (ADDERALL XR) 30 MG 24 hr capsule   3. Panic attack  F41.0      Anxiety and panic attacks-doing well on lexapro on 10mg. Continue on dose, advised therapy    adhd-stable on current meds, no side effects  Advised med as planned    Follow up in 3 months for physical       Subjective   Patrick is a 27 year old who presents for the following health issues   HPI         Medication Followup of Adderall    Taking Medication as prescribed: yes    Side Effects:  None    Medication Helping Symptoms:  yes       Anxiety Follow-Up    How are you doing with your anxiety since your last visit? No change    Are you having other symptoms that might be associated with anxiety? No    Have you had a significant life event? No     Are you feeling depressed? No    Do you have any concerns with your use of alcohol or other drugs? No  Started on lexapro daily  Prn hydroxyzine not using it        Social History     Tobacco Use     Smoking status: Never Smoker     Smokeless tobacco: Never Used   Vaping Use     Vaping Use: Never used   Substance Use Topics     Alcohol use: Yes     Comment: as 04/25/2022 been 5 months sober     Drug use: Never     No flowsheet data found.  PHQ 4/25/2022   PHQ-9 Total Score 9   Q9: Thoughts of better off dead/self-harm past 2 weeks Not at all     Last PHQ-9  4/25/2022   1.  Little interest or pleasure in doing things 0   2.  Feeling down, depressed, or hopeless 0   3.  Trouble falling or staying asleep, or sleeping too much 0   4.  Feeling tired or having little energy 0   5.  Poor appetite or overeating 0   6.  Feeling bad about yourself 3   7.  Trouble concentrating 3   8.  Moving slowly or restless 3   Q9: Thoughts of better off dead/self-harm past 2 weeks 0   PHQ-9 Total Score 9   Difficulty at work, home, or with people Very difficult     DIANE-7  4/25/2022   1. Feeling nervous, anxious, or on edge 3   2. Not being able to stop or control worrying 3   3. Worrying too much about different things 3   4. Trouble relaxing 3   5. Being so restless that it is hard to sit still 3   6. Becoming easily annoyed or irritable 3   If you checked any problems, how difficult have they made it for you to do your work, take care of things at home, or get along with other people? Very difficult                 Review of Systems   Constitutional, HEENT, cardiovascular, pulmonary, GI, , musculoskeletal, neuro, skin, endocrine and psych systems are negative, except as otherwise noted.      Objective           Vitals:  No vitals were obtained today due to virtual visit.    Physical Exam   GENERAL: Healthy, alert and no distress  EYES: Eyes grossly normal to inspection.  No discharge or erythema, or obvious scleral/conjunctival abnormalities.  RESP: No audible wheeze, cough, or visible cyanosis.  No visible retractions or increased work of breathing.    SKIN: Visible skin clear. No significant rash, abnormal pigmentation or lesions.  NEURO: Cranial nerves grossly intact.  Mentation and speech appropriate for age.  PSYCH: Mentation appears normal, affect normal/bright, judgement and insight intact, normal speech and appearance well-groomed.            Video-Visit Details    Type of service:  Video Visit    Video End Time:1:09 PM    Originating Location (pt. Location): Home    Distant  Location (provider location):  [facility]     Platform used for Video Visit: Tennille

## 2022-09-23 ENCOUNTER — TELEPHONE (OUTPATIENT)
Dept: FAMILY MEDICINE | Facility: CLINIC | Age: 27
End: 2022-09-23

## 2022-09-23 DIAGNOSIS — F41.1 GENERALIZED ANXIETY DISORDER: ICD-10-CM

## 2022-09-23 DIAGNOSIS — F90.0 ATTENTION DEFICIT HYPERACTIVITY DISORDER (ADHD), PREDOMINANTLY INATTENTIVE TYPE: ICD-10-CM

## 2022-09-23 DIAGNOSIS — F90.0 ATTENTION DEFICIT HYPERACTIVITY DISORDER (ADHD), PREDOMINANTLY INATTENTIVE TYPE: Primary | ICD-10-CM

## 2022-09-23 NOTE — TELEPHONE ENCOUNTER
Reason for Call:  Medication or medication refill:    Do you use a [facility]?  Name of the pharmacy and phone number for the current request: Pharmacy: [facility] [place] - [place] - [address] [place] [address].      Name of the medication requested:   amphetamine-dextroamphetamine (ADDERALL XR) 30 MG 24 hr capsule  30 mg, DAILY     escitalopram (LEXAPRO) 10 MG tablet  10 mg, DAILY       Other request: patient will be out of meds this weekend    Can we leave a detailed message on this number? YES    Phone number patient can be reached at: Home number on file [phone] (home)    Best Time: Any time     Call taken on 9/23/2022 at 1:29 PM by Ellen Chao

## 2022-09-26 RX ORDER — DEXTROAMPHETAMINE SACCHARATE, AMPHETAMINE ASPARTATE MONOHYDRATE, DEXTROAMPHETAMINE SULFATE AND AMPHETAMINE SULFATE 7.5; 7.5; 7.5; 7.5 MG/1; MG/1; MG/1; MG/1
CAPSULE, EXTENDED RELEASE ORAL
Qty: 30 CAPSULE | Refills: 0 | Status: SHIPPED | OUTPATIENT
Start: 2022-09-26 | End: 2022-10-18

## 2022-09-26 RX ORDER — DEXTROAMPHETAMINE SACCHARATE, AMPHETAMINE ASPARTATE MONOHYDRATE, DEXTROAMPHETAMINE SULFATE AND AMPHETAMINE SULFATE 7.5; 7.5; 7.5; 7.5 MG/1; MG/1; MG/1; MG/1
30 CAPSULE, EXTENDED RELEASE ORAL DAILY
Qty: 30 CAPSULE | Refills: 0 | Status: CANCELLED | OUTPATIENT
Start: 2022-09-26

## 2022-09-26 RX ORDER — ESCITALOPRAM OXALATE 10 MG/1
10 TABLET ORAL DAILY
Qty: 90 TABLET | Refills: 0 | Status: SHIPPED | OUTPATIENT
Start: 2022-09-26 | End: 2022-10-24

## 2022-09-26 NOTE — TELEPHONE ENCOUNTER
Routing to [place] providers    Will to send Script for Adderall and Lexapro?    Patient out of medications.    Adderall pended.  lexapro in Dr. Meredith in box awaiting to be signed.    Patient scheduled with Dr. Meredith 10/24    Artemio Stevenson, RN, BSN, PHN  [facility]

## 2022-09-26 NOTE — TELEPHONE ENCOUNTER
Reason for Call:  Other     Detailed comments:  Patient is completely out of his medications. Patient was hoping a covering provider for Dr Meredith would be willing to give a refill. Patient is scheduled to see Dr Meredith on 10/24/22 for a physical and med check up.    Phone Number Patient can be reached at: Home number on file [phone] (home)    Best Time: any    Can we leave a detailed message on this number? YES    Call taken on 9/26/2022 at 1:30 PM by Dana Victor

## 2022-10-18 NOTE — PATIENT INSTRUCTIONS
Advise covid shot  Flu shot done  Refilled all meds  Nice to see you  Follow up in 6 months  Congrats on your wedding  Katt Meredith D.O.        Patient Education     Preventive Health Recommendations  Male Ages 26 - 39    Yearly exam:             See your health care provider every year in order to  o   Review health changes.   o   Discuss preventive care.    o   Review your medicines if your doctor has prescribed any.  You should be tested each year for STDs (sexually transmitted diseases), if you re at risk.   After age 35, talk to your provider about cholesterol testing. If you are at risk for heart disease, have your cholesterol tested at least every 5 years.   If you are at risk for diabetes, you should have a diabetes test (fasting glucose).  Shots: Get a flu shot each year. Get a tetanus shot every 10 years.     Nutrition:  Eat at least 5 servings of fruits and vegetables daily.   Eat whole-grain bread, whole-wheat pasta and brown rice instead of white grains and rice.   Get adequate Calcium and Vitamin D.     Lifestyle  Exercise for at least 150 minutes a week (30 minutes a day, 5 days a week). This will help you control your weight and prevent disease.   Limit alcohol to one drink per day.   No smoking.   Wear sunscreen to prevent skin cancer.   See your dentist every six months for an exam and cleaning.

## 2022-10-18 NOTE — PROGRESS NOTES
SUBJECTIVE:   CC: Patrick is an 27 year old who presents for preventative health visit.       Patient has been advised of split billing requirements and indicates understanding: Yes  Healthy Habits:     Getting at least 3 servings of Calcium per day:  Yes    Bi-annual eye exam:  Yes    Dental care twice a year:  NO    Sleep apnea or symptoms of sleep apnea:  None    Diet:  Breakfast skipped    Frequency of exercise:  None    Taking medications regularly:  Yes    Medication side effects:  None    PHQ-2 Total Score: 0    Additional concerns today:  No        Medication Followup of adderall    Taking Medication as prescribed: yes    Side Effects:  None    Medication Helping Symptoms:  yes     this summer    Asthma doing well    Joint aches due to the job    Today's PHQ-2 Score:   PHQ-2 ( 1999 [employer]) 10/24/2022   Q1: Little interest or pleasure in doing things 0   Q2: Feeling down, depressed or hopeless 0   PHQ-2 Score 0   PHQ-2 Total Score (12-17 Years)- Positive if 3 or more points; Administer PHQ-A if positive -   Q1: Little interest or pleasure in doing things Not at all   Q2: Feeling down, depressed or hopeless Not at all   PHQ-2 Score 0       Abuse: Current or Past(Physical, Sexual or Emotional)- No  Do you feel safe in your environment? Yes        Social History     Tobacco Use     Smoking status: Never     Smokeless tobacco: Never   Substance Use Topics     Alcohol use: Yes     Comment: as 04/25/2022 been 5 months sober         Alcohol Use 10/24/2022   Prescreen: >3 drinks/day or >7 drinks/week? No   AUDIT SCORE  -       Last PSA: No results found for: PSA    Reviewed orders with patient. Reviewed health maintenance and updated orders accordingly - Yes  BP Readings from Last 3 Encounters:   10/24/22 122/70   04/15/21 124/68   04/24/20 128/64    Wt Readings from Last 3 Encounters:   10/24/22 81.2 kg (179 lb)   04/15/21 76.7 kg (169 lb)   04/24/20 73.5 kg (162 lb)                    Reviewed and updated as  "needed this visit by clinical staff   Tobacco  Allergies  Meds              Reviewed and updated as needed this visit by Provider                 Past Medical History:   Diagnosis Date     Kidney stone 2018    Left      No past surgical history on file.  OB History   No obstetric history on file.       Review of Systems   Constitutional: Negative for chills and fever.   HENT: Negative for congestion, ear pain, hearing loss and sore throat.    Eyes: Negative for pain and visual disturbance.   Respiratory: Negative for cough and shortness of breath.    Cardiovascular: Negative for chest pain, palpitations and peripheral edema.   Gastrointestinal: Negative for abdominal pain, constipation, diarrhea, heartburn, hematochezia and nausea.   Genitourinary: Negative for dysuria, frequency, genital sores, hematuria and urgency.   Musculoskeletal: Positive for arthralgias, joint swelling and myalgias.   Skin: Negative for rash.   Neurological: Negative for dizziness, weakness, headaches and paresthesias.   Psychiatric/Behavioral: Negative for mood changes. The patient is not nervous/anxious.      CONSTITUTIONAL: NEGATIVE for fever, chills, change in weight  INTEGUMENTARY/SKIN: NEGATIVE for worrisome rashes, moles or lesions  EYES: NEGATIVE for vision changes or irritation  ENT: NEGATIVE for ear, mouth and throat problems  RESP: NEGATIVE for significant cough or SOB  CV: NEGATIVE for chest pain, palpitations or peripheral edema  GI: NEGATIVE for nausea, abdominal pain, heartburn, or change in bowel habits   male: negative for dysuria, hematuria, decreased urinary stream, erectile dysfunction, urethral discharge  MUSCULOSKELETAL: NEGATIVE for significant arthralgias or myalgia  NEURO: NEGATIVE for weakness, dizziness or paresthesias  PSYCHIATRIC: NEGATIVE for changes in mood or affect    OBJECTIVE:   /70   Pulse 72   Temp 98  F (36.7  C) (Oral)   Resp 16   Ht 1.778 m (5' 10\")   Wt 81.2 kg (179 lb)   SpO2 100%   " BMI 25.68 kg/m      Physical Exam  GENERAL: healthy, alert and no distress  EYES: Eyes grossly normal to inspection, PERRL and conjunctivae and sclerae normal  HENT: ear canals and TM's normal, nose and mouth without ulcers or lesions  NECK: no adenopathy, no asymmetry, masses, or scars and thyroid normal to palpation  RESP: lungs clear to auscultation - no rales, rhonchi or wheezes  CV: regular rate and rhythm, normal S1 S2, no S3 or S4, no murmur, click or rub, no peripheral edema and peripheral pulses strong  ABDOMEN: soft, nontender, no hepatosplenomegaly, no masses and bowel sounds normal  MS: no gross musculoskeletal defects noted, no edema  SKIN: no suspicious lesions or rashes  NEURO: Normal strength and tone, mentation intact and speech normal  PSYCH: mentation appears normal, affect normal/bright    Diagnostic Test Results:  Labs reviewed in Epic    ASSESSMENT/PLAN:       ICD-10-CM    1. Routine general medical examination at a health care facility  Z00.00       2. Attention deficit hyperactivity disorder (ADHD), predominantly inattentive type  F90.0 amphetamine-dextroamphetamine (ADDERALL XR) 30 MG 24 hr capsule     amphetamine-dextroamphetamine (ADDERALL XR) 30 MG 24 hr capsule     amphetamine-dextroamphetamine (ADDERALL XR) 30 MG 24 hr capsule     amphetamine-dextroamphetamine (ADDERALL XR) 30 MG 24 hr capsule      3. Generalized anxiety disorder  F41.1 escitalopram (LEXAPRO) 10 MG tablet      4. Exercise-induced asthma  J45.990         Anxiety-doing well on med, no side effects    adhd-doing well on adderall, no side effects, recently got  and is working full time.  Refilled meds, recent urine tox screen reviewed ,  reviewed    Exercise induced asthma-no issues, rarely needs albuterol, flu shot done  Declined covid and pneumonia shot      Patient has been advised of split billing requirements and indicates understanding: Yes    COUNSELING:   Reviewed preventive health counseling, as reflected  "in patient instructions       Regular exercise       Healthy diet/nutrition       Hearing screening       Alcohol Use        Osteoporosis prevention/bone health    Estimated body mass index is 25.68 kg/m  as calculated from the following:    Height as of this encounter: 1.778 m (5' 10\").    Weight as of this encounter: 81.2 kg (179 lb).     Weight management plan: Patient referred to endocrine and/or weight management specialty Discussed healthy diet and exercise guidelines    He reports that he has never smoked. He has never used smokeless tobacco.      Counseling Resources:  ATP IV Guidelines  Pooled Cohorts Equation Calculator  FRAX Risk Assessment  ICSI Preventive Guidelines  Dietary Guidelines for Americans, 2010  USDA's MyPlate  ASA Prophylaxis  Lung CA Screening    DO ADAIR Dhillon [facility]  Answers for HPI/ROS submitted by the patient on 10/24/2022  DIANE 7 TOTAL SCORE: 2      "

## 2022-10-24 ENCOUNTER — OFFICE VISIT (OUTPATIENT)
Dept: FAMILY MEDICINE | Facility: CLINIC | Age: 27
End: 2022-10-24
Payer: COMMERCIAL

## 2022-10-24 VITALS
SYSTOLIC BLOOD PRESSURE: 122 MMHG | RESPIRATION RATE: 16 BRPM | BODY MASS INDEX: 25.62 KG/M2 | TEMPERATURE: 98 F | OXYGEN SATURATION: 100 % | WEIGHT: 179 LBS | DIASTOLIC BLOOD PRESSURE: 70 MMHG | HEART RATE: 72 BPM | HEIGHT: 70 IN

## 2022-10-24 DIAGNOSIS — Z13.1 SCREENING FOR DIABETES MELLITUS: ICD-10-CM

## 2022-10-24 DIAGNOSIS — Z13.220 SCREENING FOR LIPOID DISORDERS: ICD-10-CM

## 2022-10-24 DIAGNOSIS — F41.1 GENERALIZED ANXIETY DISORDER: ICD-10-CM

## 2022-10-24 DIAGNOSIS — J45.990 EXERCISE-INDUCED ASTHMA: ICD-10-CM

## 2022-10-24 DIAGNOSIS — F90.0 ATTENTION DEFICIT HYPERACTIVITY DISORDER (ADHD), PREDOMINANTLY INATTENTIVE TYPE: ICD-10-CM

## 2022-10-24 DIAGNOSIS — Z00.00 ROUTINE GENERAL MEDICAL EXAMINATION AT A HEALTH CARE FACILITY: Primary | ICD-10-CM

## 2022-10-24 LAB
ALBUMIN SERPL-MCNC: 3.8 G/DL (ref 3.4–5)
ALP SERPL-CCNC: 78 U/L (ref 40–150)
ALT SERPL W P-5'-P-CCNC: 22 U/L (ref 0–70)
ANION GAP SERPL CALCULATED.3IONS-SCNC: 2 MMOL/L (ref 3–14)
AST SERPL W P-5'-P-CCNC: 19 U/L (ref 0–45)
BILIRUB SERPL-MCNC: 0.4 MG/DL (ref 0.2–1.3)
BUN SERPL-MCNC: 14 MG/DL (ref 7–30)
CALCIUM SERPL-MCNC: 9 MG/DL (ref 8.5–10.1)
CHLORIDE BLD-SCNC: 110 MMOL/L (ref 94–109)
CHOLEST SERPL-MCNC: 179 MG/DL
CO2 SERPL-SCNC: 29 MMOL/L (ref 20–32)
CREAT SERPL-MCNC: 1.16 MG/DL (ref 0.66–1.25)
FASTING STATUS PATIENT QL REPORTED: YES
GFR SERPL CREATININE-BSD FRML MDRD: 89 ML/MIN/1.73M2
GLUCOSE BLD-MCNC: 103 MG/DL (ref 70–99)
HDLC SERPL-MCNC: 53 MG/DL
HGB BLD-MCNC: 13.8 G/DL (ref 13.3–17.7)
LDLC SERPL CALC-MCNC: 115 MG/DL
NONHDLC SERPL-MCNC: 126 MG/DL
POTASSIUM BLD-SCNC: 4 MMOL/L (ref 3.4–5.3)
PROT SERPL-MCNC: 7 G/DL (ref 6.8–8.8)
SODIUM SERPL-SCNC: 141 MMOL/L (ref 133–144)
TRIGL SERPL-MCNC: 53 MG/DL

## 2022-10-24 PROCEDURE — 90471 IMMUNIZATION ADMIN: CPT | Performed by: FAMILY MEDICINE

## 2022-10-24 PROCEDURE — 90686 IIV4 VACC NO PRSV 0.5 ML IM: CPT | Performed by: FAMILY MEDICINE

## 2022-10-24 PROCEDURE — 85018 HEMOGLOBIN: CPT | Performed by: FAMILY MEDICINE

## 2022-10-24 PROCEDURE — 99395 PREV VISIT EST AGE 18-39: CPT | Mod: 25 | Performed by: FAMILY MEDICINE

## 2022-10-24 PROCEDURE — 99213 OFFICE O/P EST LOW 20 MIN: CPT | Mod: 25 | Performed by: FAMILY MEDICINE

## 2022-10-24 PROCEDURE — 36415 COLL VENOUS BLD VENIPUNCTURE: CPT | Performed by: FAMILY MEDICINE

## 2022-10-24 PROCEDURE — 80053 COMPREHEN METABOLIC PANEL: CPT | Performed by: FAMILY MEDICINE

## 2022-10-24 PROCEDURE — 80061 LIPID PANEL: CPT | Performed by: FAMILY MEDICINE

## 2022-10-24 RX ORDER — DEXTROAMPHETAMINE SACCHARATE, AMPHETAMINE ASPARTATE MONOHYDRATE, DEXTROAMPHETAMINE SULFATE AND AMPHETAMINE SULFATE 7.5; 7.5; 7.5; 7.5 MG/1; MG/1; MG/1; MG/1
30 CAPSULE, EXTENDED RELEASE ORAL DAILY
Qty: 30 CAPSULE | Refills: 0 | Status: SHIPPED | OUTPATIENT
Start: 2022-12-23 | End: 2023-02-26

## 2022-10-24 RX ORDER — DEXTROAMPHETAMINE SACCHARATE, AMPHETAMINE ASPARTATE MONOHYDRATE, DEXTROAMPHETAMINE SULFATE AND AMPHETAMINE SULFATE 7.5; 7.5; 7.5; 7.5 MG/1; MG/1; MG/1; MG/1
30 CAPSULE, EXTENDED RELEASE ORAL DAILY
Qty: 30 CAPSULE | Refills: 0 | Status: SHIPPED | OUTPATIENT
Start: 2022-11-24 | End: 2022-12-24

## 2022-10-24 RX ORDER — ESCITALOPRAM OXALATE 10 MG/1
10 TABLET ORAL DAILY
Qty: 90 TABLET | Refills: 1 | Status: SHIPPED | OUTPATIENT
Start: 2022-10-24 | End: 2023-07-03

## 2022-10-24 RX ORDER — DEXTROAMPHETAMINE SACCHARATE, AMPHETAMINE ASPARTATE MONOHYDRATE, DEXTROAMPHETAMINE SULFATE AND AMPHETAMINE SULFATE 7.5; 7.5; 7.5; 7.5 MG/1; MG/1; MG/1; MG/1
CAPSULE, EXTENDED RELEASE ORAL
Qty: 30 CAPSULE | Refills: 0 | Status: SHIPPED | OUTPATIENT
Start: 2022-10-24 | End: 2023-04-25

## 2022-10-24 RX ORDER — DEXTROAMPHETAMINE SACCHARATE, AMPHETAMINE ASPARTATE MONOHYDRATE, DEXTROAMPHETAMINE SULFATE AND AMPHETAMINE SULFATE 7.5; 7.5; 7.5; 7.5 MG/1; MG/1; MG/1; MG/1
30 CAPSULE, EXTENDED RELEASE ORAL DAILY
Qty: 30 CAPSULE | Refills: 0 | Status: SHIPPED | OUTPATIENT
Start: 2023-01-25 | End: 2023-02-26

## 2022-10-24 ASSESSMENT — ENCOUNTER SYMPTOMS
MYALGIAS: 1
WEAKNESS: 0
COUGH: 0
DIZZINESS: 0
PALPITATIONS: 0
ARTHRALGIAS: 1
JOINT SWELLING: 1
FREQUENCY: 0
NERVOUS/ANXIOUS: 0
HEMATOCHEZIA: 0
SHORTNESS OF BREATH: 0
PARESTHESIAS: 0
DIARRHEA: 0
CONSTIPATION: 0
HEARTBURN: 0
HEMATURIA: 0
CHILLS: 0
HEADACHES: 0
NAUSEA: 0
EYE PAIN: 0
DYSURIA: 0
SORE THROAT: 0
ABDOMINAL PAIN: 0
FEVER: 0

## 2022-10-24 ASSESSMENT — PAIN SCALES - GENERAL: PAINLEVEL: NO PAIN (0)

## 2022-10-24 ASSESSMENT — ASTHMA QUESTIONNAIRES
QUESTION_2 LAST FOUR WEEKS HOW OFTEN HAVE YOU HAD SHORTNESS OF BREATH: ONCE OR TWICE A WEEK
QUESTION_5 LAST FOUR WEEKS HOW WOULD YOU RATE YOUR ASTHMA CONTROL: WELL CONTROLLED
QUESTION_1 LAST FOUR WEEKS HOW MUCH OF THE TIME DID YOUR ASTHMA KEEP YOU FROM GETTING AS MUCH DONE AT WORK, SCHOOL OR AT HOME: A LITTLE OF THE TIME
ACT_TOTALSCORE: 22
QUESTION_4 LAST FOUR WEEKS HOW OFTEN HAVE YOU USED YOUR RESCUE INHALER OR NEBULIZER MEDICATION (SUCH AS ALBUTEROL): NOT AT ALL
QUESTION_3 LAST FOUR WEEKS HOW OFTEN DID YOUR ASTHMA SYMPTOMS (WHEEZING, COUGHING, SHORTNESS OF BREATH, CHEST TIGHTNESS OR PAIN) WAKE YOU UP AT NIGHT OR EARLIER THAN USUAL IN THE MORNING: NOT AT ALL
ACT_TOTALSCORE: 22

## 2022-10-24 ASSESSMENT — ANXIETY QUESTIONNAIRES
8. IF YOU CHECKED OFF ANY PROBLEMS, HOW DIFFICULT HAVE THESE MADE IT FOR YOU TO DO YOUR WORK, TAKE CARE OF THINGS AT HOME, OR GET ALONG WITH OTHER PEOPLE?: NOT DIFFICULT AT ALL
IF YOU CHECKED OFF ANY PROBLEMS ON THIS QUESTIONNAIRE, HOW DIFFICULT HAVE THESE PROBLEMS MADE IT FOR YOU TO DO YOUR WORK, TAKE CARE OF THINGS AT HOME, OR GET ALONG WITH OTHER PEOPLE: NOT DIFFICULT AT ALL
5. BEING SO RESTLESS THAT IT IS HARD TO SIT STILL: SEVERAL DAYS
1. FEELING NERVOUS, ANXIOUS, OR ON EDGE: NOT AT ALL
7. FEELING AFRAID AS IF SOMETHING AWFUL MIGHT HAPPEN: NOT AT ALL
2. NOT BEING ABLE TO STOP OR CONTROL WORRYING: NOT AT ALL
4. TROUBLE RELAXING: SEVERAL DAYS
3. WORRYING TOO MUCH ABOUT DIFFERENT THINGS: NOT AT ALL
GAD7 TOTAL SCORE: 2
7. FEELING AFRAID AS IF SOMETHING AWFUL MIGHT HAPPEN: NOT AT ALL
6. BECOMING EASILY ANNOYED OR IRRITABLE: NOT AT ALL
GAD7 TOTAL SCORE: 2

## 2022-10-24 NOTE — RESULT ENCOUNTER NOTE
Your sugar is slightly elevated, if fasting , we should check diabetes mellitus testing in the next 3 months.  Otherwise stable labs.   Please contact me if you have any questions.  Take care,  Katt Meredith D.O.

## 2022-10-24 NOTE — LETTER
October 24, 2022      Patrick Gutierrez  [address]   [place] [address]        Dear Patrick,       Your sugar is slightly elevated. If fasting today, we should check diabetes mellitus in the next 3 months.  Otherwise stable labs.   Please contact me if you have any questions.       Sincerely,        Katt Meredith, DO    Results for orders placed or performed in visit on 10/24/22   Lipid panel reflex to direct LDL Fasting     Status: Abnormal   Result Value Ref Range    Cholesterol 179 <200 mg/dL    Triglycerides 53 <150 mg/dL    Direct Measure HDL 53 >=40 mg/dL    LDL Cholesterol Calculated 115 (H) <=100 mg/dL    Non HDL Cholesterol 126 <130 mg/dL    Patient Fasting > 8hrs? Yes     Narrative    Cholesterol  Desirable:  <200 mg/dL    Triglycerides  Normal:  Less than 150 mg/dL  Borderline High:  150-199 mg/dL  High:  200-499 mg/dL  Very High:  Greater than or equal to 500 mg/dL    Direct Measure HDL  Female:  Greater than or equal to 50 mg/dL   Male:  Greater than or equal to 40 mg/dL    LDL Cholesterol  Desirable:  <100mg/dL  Above Desirable:  100-129 mg/dL   Borderline High:  130-159 mg/dL   High:  160-189 mg/dL   Very High:  >= 190 mg/dL    Non HDL Cholesterol  Desirable:  130 mg/dL  Above Desirable:  130-159 mg/dL  Borderline High:  160-189 mg/dL  High:  190-219 mg/dL  Very High:  Greater than or equal to 220 mg/dL   Hemoglobin     Status: Normal   Result Value Ref Range    Hemoglobin 13.8 13.3 - 17.7 g/dL   Comprehensive metabolic panel (BMP + Alb, Alk Phos, ALT, AST, Total. Bili, TP)     Status: Abnormal   Result Value Ref Range    Sodium 141 133 - 144 mmol/L    Potassium 4.0 3.4 - 5.3 mmol/L    Chloride 110 (H) 94 - 109 mmol/L    Carbon Dioxide (CO2) 29 20 - 32 mmol/L    Anion Gap 2 (L) 3 - 14 mmol/L    Urea Nitrogen 14 7 - 30 mg/dL    Creatinine 1.16 0.66 - 1.25 mg/dL    Calcium 9.0 8.5 - 10.1 mg/dL    Glucose 103 (H) 70 - 99 mg/dL    Alkaline Phosphatase 78 40 - 150 U/L    AST 19 0 -  45 U/L    ALT 22 0 - 70 U/L    Protein Total 7.0 6.8 - 8.8 g/dL    Albumin 3.8 3.4 - 5.0 g/dL    Bilirubin Total 0.4 0.2 - 1.3 mg/dL    GFR Estimate 89 >60 mL/min/1.73m2

## 2022-10-26 DIAGNOSIS — J45.990 EXERCISE-INDUCED ASTHMA: ICD-10-CM

## 2022-10-26 RX ORDER — ALBUTEROL SULFATE 90 UG/1
AEROSOL, METERED RESPIRATORY (INHALATION)
Qty: 8.5 G | Refills: 1 | Status: SHIPPED | OUTPATIENT
Start: 2022-10-26 | End: 2023-02-24

## 2023-02-24 ENCOUNTER — TELEPHONE (OUTPATIENT)
Dept: FAMILY MEDICINE | Facility: CLINIC | Age: 28
End: 2023-02-24
Payer: COMMERCIAL

## 2023-02-24 DIAGNOSIS — F90.0 ATTENTION DEFICIT HYPERACTIVITY DISORDER (ADHD), PREDOMINANTLY INATTENTIVE TYPE: ICD-10-CM

## 2023-02-24 RX ORDER — DEXTROAMPHETAMINE SACCHARATE, AMPHETAMINE ASPARTATE MONOHYDRATE, DEXTROAMPHETAMINE SULFATE AND AMPHETAMINE SULFATE 7.5; 7.5; 7.5; 7.5 MG/1; MG/1; MG/1; MG/1
CAPSULE, EXTENDED RELEASE ORAL
Qty: 30 CAPSULE | Refills: 0 | Status: SHIPPED | OUTPATIENT
Start: 2023-02-24 | End: 2023-04-25

## 2023-02-24 NOTE — TELEPHONE ENCOUNTER
Routing to [facility] providers.    Patient in office today and requesting refill for adderall 30 mg that is due today.    Med was pended yesterday, but never signed.     Willing to refill?    Celeste Hearn RN

## 2023-02-24 NOTE — TELEPHONE ENCOUNTER
RN called pt and relayed provider message    RN transferred pt to central scheduling for follow-up    Benita Morfin RN

## 2023-02-24 NOTE — TELEPHONE ENCOUNTER
30 day refill sent to [facility] pharmacy. Please help patient schedule an appt with PCP prior to 30 days being up.     Thanks,  Veronica Meza DNP, NP-C

## 2023-03-07 ENCOUNTER — OFFICE VISIT (OUTPATIENT)
Dept: FAMILY MEDICINE | Facility: CLINIC | Age: 28
End: 2023-03-07
Payer: COMMERCIAL

## 2023-03-07 VITALS
BODY MASS INDEX: 25.91 KG/M2 | RESPIRATION RATE: 18 BRPM | TEMPERATURE: 97.2 F | OXYGEN SATURATION: 100 % | SYSTOLIC BLOOD PRESSURE: 139 MMHG | WEIGHT: 181 LBS | HEIGHT: 70 IN | DIASTOLIC BLOOD PRESSURE: 84 MMHG | HEART RATE: 87 BPM

## 2023-03-07 DIAGNOSIS — F10.11 ALCOHOL USE DISORDER, MILD, IN EARLY REMISSION, ABUSE: ICD-10-CM

## 2023-03-07 DIAGNOSIS — F90.0 ATTENTION DEFICIT HYPERACTIVITY DISORDER (ADHD), PREDOMINANTLY INATTENTIVE TYPE: Primary | ICD-10-CM

## 2023-03-07 DIAGNOSIS — J45.990 EXERCISE-INDUCED ASTHMA: ICD-10-CM

## 2023-03-07 PROBLEM — F19.982 DRUG-INDUCED INSOMNIA (H): Status: ACTIVE | Noted: 2023-03-07

## 2023-03-07 PROBLEM — F19.982 DRUG-INDUCED INSOMNIA (H): Status: RESOLVED | Noted: 2023-03-07 | Resolved: 2023-03-07

## 2023-03-07 PROCEDURE — 99214 OFFICE O/P EST MOD 30 MIN: CPT | Performed by: FAMILY MEDICINE

## 2023-03-07 RX ORDER — DEXTROAMPHETAMINE SACCHARATE, AMPHETAMINE ASPARTATE MONOHYDRATE, DEXTROAMPHETAMINE SULFATE AND AMPHETAMINE SULFATE 7.5; 7.5; 7.5; 7.5 MG/1; MG/1; MG/1; MG/1
CAPSULE, EXTENDED RELEASE ORAL
Qty: 30 CAPSULE | Refills: 0 | Status: SHIPPED | OUTPATIENT
Start: 2023-03-24

## 2023-03-07 RX ORDER — DEXTROAMPHETAMINE SACCHARATE, AMPHETAMINE ASPARTATE MONOHYDRATE, DEXTROAMPHETAMINE SULFATE AND AMPHETAMINE SULFATE 7.5; 7.5; 7.5; 7.5 MG/1; MG/1; MG/1; MG/1
30 CAPSULE, EXTENDED RELEASE ORAL DAILY
Qty: 30 CAPSULE | Refills: 0 | Status: SHIPPED | OUTPATIENT
Start: 2023-05-24 | End: 2023-04-26

## 2023-03-07 RX ORDER — DEXTROAMPHETAMINE SACCHARATE, AMPHETAMINE ASPARTATE MONOHYDRATE, DEXTROAMPHETAMINE SULFATE AND AMPHETAMINE SULFATE 7.5; 7.5; 7.5; 7.5 MG/1; MG/1; MG/1; MG/1
30 CAPSULE, EXTENDED RELEASE ORAL DAILY
Qty: 30 CAPSULE | Refills: 0 | Status: SHIPPED | OUTPATIENT
Start: 2023-04-24 | End: 2023-05-24

## 2023-03-07 ASSESSMENT — ANXIETY QUESTIONNAIRES
GAD7 TOTAL SCORE: 1
5. BEING SO RESTLESS THAT IT IS HARD TO SIT STILL: NOT AT ALL
6. BECOMING EASILY ANNOYED OR IRRITABLE: NOT AT ALL
3. WORRYING TOO MUCH ABOUT DIFFERENT THINGS: NOT AT ALL
GAD7 TOTAL SCORE: 1
1. FEELING NERVOUS, ANXIOUS, OR ON EDGE: NOT AT ALL
4. TROUBLE RELAXING: SEVERAL DAYS
8. IF YOU CHECKED OFF ANY PROBLEMS, HOW DIFFICULT HAVE THESE MADE IT FOR YOU TO DO YOUR WORK, TAKE CARE OF THINGS AT HOME, OR GET ALONG WITH OTHER PEOPLE?: NOT DIFFICULT AT ALL
2. NOT BEING ABLE TO STOP OR CONTROL WORRYING: NOT AT ALL
7. FEELING AFRAID AS IF SOMETHING AWFUL MIGHT HAPPEN: NOT AT ALL
IF YOU CHECKED OFF ANY PROBLEMS ON THIS QUESTIONNAIRE, HOW DIFFICULT HAVE THESE PROBLEMS MADE IT FOR YOU TO DO YOUR WORK, TAKE CARE OF THINGS AT HOME, OR GET ALONG WITH OTHER PEOPLE: NOT DIFFICULT AT ALL
7. FEELING AFRAID AS IF SOMETHING AWFUL MIGHT HAPPEN: NOT AT ALL
GAD7 TOTAL SCORE: 1

## 2023-03-07 ASSESSMENT — PATIENT HEALTH QUESTIONNAIRE - PHQ9
SUM OF ALL RESPONSES TO PHQ QUESTIONS 1-9: 0
SUM OF ALL RESPONSES TO PHQ QUESTIONS 1-9: 0
10. IF YOU CHECKED OFF ANY PROBLEMS, HOW DIFFICULT HAVE THESE PROBLEMS MADE IT FOR YOU TO DO YOUR WORK, TAKE CARE OF THINGS AT HOME, OR GET ALONG WITH OTHER PEOPLE: NOT DIFFICULT AT ALL

## 2023-03-07 ASSESSMENT — PAIN SCALES - GENERAL: PAINLEVEL: NO PAIN (0)

## 2023-03-07 NOTE — PROGRESS NOTES
ICD-10-CM    1. Attention deficit hyperactivity disorder (ADHD), predominantly inattentive type  F90.0 amphetamine-dextroamphetamine (ADDERALL XR) 30 MG 24 hr capsule     amphetamine-dextroamphetamine (ADDERALL XR) 30 MG 24 hr capsule     amphetamine-dextroamphetamine (ADDERALL XR) 30 MG 24 hr capsule      2. Exercise-induced asthma  J45.990       3. Alcohol use disorder, mild, in early remission, abuse  F10.11           adhd-stable on current med, no side effect    Asthma-stable,exercise induced    Early alcohol use remission-doing well with sobriety. Goes to       Shelly Nguyen is a 27 year old presenting for the following health issues:  Recheck Medication      History of Present Illness       Reason for visit:  Med check up    He eats 2-3 servings of fruits and vegetables daily.He consumes 5 sweetened beverage(s) daily.He exercises with enough effort to increase his heart rate 60 or more minutes per day.  He exercises with enough effort to increase his heart rate 6 days per week.   He is taking medications regularly.    Today's PHQ-9         PHQ-9 Total Score: 0    PHQ-9 Q9 Thoughts of better off dead/self-harm past 2 weeks :   Not at all    How difficult have these problems made it for you to do your work, take care of things at home, or get along with other people: Not difficult at all  Today's DIANE-7 Score: 1       Medication Followup of Adderall     Taking Medication as prescribed: yes    Side Effects:  None    Medication Helping Symptoms:  yes    Doing well on this med  He has no sleep issues  He is taking it even on       He goes to   Goes to [facility]  Accountability      Review of Systems   Constitutional, HEENT, cardiovascular, pulmonary, GI, , musculoskeletal, neuro, skin, endocrine and psych systems are negative, except as otherwise noted.      Objective    /84 (BP Location: Right arm, Patient Position: Sitting, Cuff Size: Adult Large)   Pulse 87   Temp 97.2  F (36.2  C) (Tympanic)   " Resp 18   Ht 1.778 m (5' 10\")   Wt 82.1 kg (181 lb)   SpO2 100%   BMI 25.97 kg/m    Body mass index is 25.97 kg/m .  Physical Exam   GENERAL: healthy, alert and no distress  NECK: no adenopathy, no asymmetry, masses, or scars and thyroid normal to palpation  RESP: lungs clear to auscultation - no rales, rhonchi or wheezes  CV: regular rate and rhythm, normal S1 S2, no S3 or S4, no murmur, click or rub, no peripheral edema and peripheral pulses strong  ABDOMEN: soft, nontender, no hepatosplenomegaly, no masses and bowel sounds normal  MS: no gross musculoskeletal defects noted, no edema              "

## 2023-04-25 DIAGNOSIS — F90.0 ATTENTION DEFICIT HYPERACTIVITY DISORDER (ADHD), PREDOMINANTLY INATTENTIVE TYPE: ICD-10-CM

## 2023-04-25 RX ORDER — DEXTROAMPHETAMINE SACCHARATE, AMPHETAMINE ASPARTATE MONOHYDRATE, DEXTROAMPHETAMINE SULFATE AND AMPHETAMINE SULFATE 7.5; 7.5; 7.5; 7.5 MG/1; MG/1; MG/1; MG/1
30 CAPSULE, EXTENDED RELEASE ORAL DAILY
Qty: 30 CAPSULE | Refills: 0 | Status: CANCELLED | OUTPATIENT
Start: 2023-05-25

## 2023-04-25 RX ORDER — DEXTROAMPHETAMINE SACCHARATE, AMPHETAMINE ASPARTATE MONOHYDRATE, DEXTROAMPHETAMINE SULFATE AND AMPHETAMINE SULFATE 7.5; 7.5; 7.5; 7.5 MG/1; MG/1; MG/1; MG/1
30 CAPSULE, EXTENDED RELEASE ORAL DAILY
Qty: 30 CAPSULE | Refills: 0 | Status: CANCELLED | OUTPATIENT
Start: 2023-04-25

## 2023-04-25 NOTE — TELEPHONE ENCOUNTER
Patient is calling requesting to have her Adderall XR 30 Mg  for the month of 04/24/2023.  Patient is hoping to have this done today as she is completely out of medication     moved to   [facility] in [address]   Ph:[phone]      Please call patient with any further questions.    Thanks,    Madalyn Pickering    [facility]

## 2023-04-25 NOTE — TELEPHONE ENCOUNTER
Person by the name Hope calling back requesting an update on the medication being requested to be switched to [facility].    Earlier in the Conversation this person Hope called stating she was Patrick (even though I noticed the chart indicated male, it was a female calling stating she was Patrick provide all the information that needed to be verify in patients chart. When on saying that when she tired picking up RX from [facility] it was to expensive for her to pay for, was requesting it to be sent to [facility] where its cheaper. TC put in request.    At 4:30 pamela TC received call again wanting the update if the RX was sent in, after informing the patient that requests can take 24hrs to be completed she went on and stated that HE'S completely out of medication and needs it. After catching on from my convo earlier with the patient I knew right away she wasn't Patrick calling as she had mention in a call earlier today.    TC disconnected the call and called the number on file [phone] to confirm the patient was requesting unable to get in touch with any one to confirm this request. As of now request has been stopped until we hear from the patient himself to put this request in.        Madalyn Pickering    [facility]

## 2023-04-25 NOTE — TELEPHONE ENCOUNTER
Routing to PCP      Pt changed pharmacy and is needing her rx of Adderall for April and May sent to [facility]- pended    RN will cancel adderall prescriptions at [facility] pharmacy      COLLINS Hewitt    Triage Nurse  [facility]  Appointment line: [phone]  [facility], 24 hour nurse line, available by calling clinic at [phone] and following prompts.

## 2023-04-25 NOTE — TELEPHONE ENCOUNTER
Routing to PCP     Please cancel requesting     See message below.      Thanks,      Madalyn Pickering    [facility]

## 2023-04-26 RX ORDER — DEXTROAMPHETAMINE SACCHARATE, AMPHETAMINE ASPARTATE MONOHYDRATE, DEXTROAMPHETAMINE SULFATE AND AMPHETAMINE SULFATE 7.5; 7.5; 7.5; 7.5 MG/1; MG/1; MG/1; MG/1
30 CAPSULE, EXTENDED RELEASE ORAL DAILY
Qty: 30 CAPSULE | Refills: 0 | Status: SHIPPED | OUTPATIENT
Start: 2023-05-24

## 2023-04-26 NOTE — TELEPHONE ENCOUNTER
Routing to PCP:    Patrick called back stated that the person he had calling was his wife Hope and that he asked her to had his medication switched to the [facility].      Requesting new RX sent to   [id number] - [place], [place] - [address] [address]    [facility]

## 2023-04-27 ENCOUNTER — TELEPHONE (OUTPATIENT)
Dept: FAMILY MEDICINE | Facility: CLINIC | Age: 28
End: 2023-04-27
Payer: COMMERCIAL

## 2023-04-27 DIAGNOSIS — F90.0 ATTENTION DEFICIT HYPERACTIVITY DISORDER (ADHD), PREDOMINANTLY INATTENTIVE TYPE: ICD-10-CM

## 2023-04-27 NOTE — TELEPHONE ENCOUNTER
Routing to PCP    Patient calling. He is unable to get adderall XR prescription. RN called our pharmacy and they are unable to see the orders for 4/24, 5/24.    RN called [facility] to confirm that he has not picked up an RX from them and they confirmed that they see the order for 5/24, but he has never picked up RX.    RN huddled with pcp    [facility] pharmacy was able to reactivate the orders    Pharmacy called pt to update    Celeste Hearn RN

## 2023-06-28 DIAGNOSIS — F90.0 ATTENTION DEFICIT HYPERACTIVITY DISORDER (ADHD), PREDOMINANTLY INATTENTIVE TYPE: ICD-10-CM

## 2023-06-28 DIAGNOSIS — J45.990 EXERCISE-INDUCED ASTHMA: ICD-10-CM

## 2023-06-29 RX ORDER — ALBUTEROL SULFATE 90 UG/1
AEROSOL, METERED RESPIRATORY (INHALATION)
Qty: 18 G | Refills: 0 | Status: SHIPPED | OUTPATIENT
Start: 2023-06-29 | End: 2023-08-28

## 2023-06-29 RX ORDER — DEXTROAMPHETAMINE SULFATE, DEXTROAMPHETAMINE SACCHARATE, AMPHETAMINE SULFATE AND AMPHETAMINE ASPARTATE 7.5; 7.5; 7.5; 7.5 MG/1; MG/1; MG/1; MG/1
CAPSULE, EXTENDED RELEASE ORAL
Qty: 30 CAPSULE | Refills: 0 | Status: SHIPPED | OUTPATIENT
Start: 2023-06-29 | End: 2023-07-27

## 2023-07-03 DIAGNOSIS — F41.1 GENERALIZED ANXIETY DISORDER: ICD-10-CM

## 2023-07-03 RX ORDER — ESCITALOPRAM OXALATE 10 MG/1
10 TABLET ORAL DAILY
Qty: 90 TABLET | Refills: 0 | Status: SHIPPED | OUTPATIENT
Start: 2023-07-03 | End: 2023-10-02

## 2023-07-27 DIAGNOSIS — F90.0 ATTENTION DEFICIT HYPERACTIVITY DISORDER (ADHD), PREDOMINANTLY INATTENTIVE TYPE: ICD-10-CM

## 2023-07-27 RX ORDER — DEXTROAMPHETAMINE SULFATE, DEXTROAMPHETAMINE SACCHARATE, AMPHETAMINE SULFATE AND AMPHETAMINE ASPARTATE 7.5; 7.5; 7.5; 7.5 MG/1; MG/1; MG/1; MG/1
CAPSULE, EXTENDED RELEASE ORAL
Qty: 30 CAPSULE | Refills: 0 | Status: SHIPPED | OUTPATIENT
Start: 2023-07-27 | End: 2023-08-25

## 2023-07-27 NOTE — TELEPHONE ENCOUNTER
Patient calling and requesting a refill of the below pended medication. Patient unsure if he is due for an appointment or not.  Patient is completely out of his medication and would like refilled ASAP.    VERONIKA Major  [facility]

## 2023-07-28 NOTE — TELEPHONE ENCOUNTER
TC reached out to help make appt     No answer VM box full       Madalyn Pickering    [facility]

## 2023-07-28 NOTE — TELEPHONE ENCOUNTER
He needs an appoint for preventive visit. In ocotber, please help him with appoint  Thanks  Katt Meredith D.O.

## 2023-08-04 NOTE — TELEPHONE ENCOUNTER
Patients insurance will be ending in Aug, he will call once he will call after he know what insurance he will have.    Madalyn Pickering    [facility]

## 2023-08-23 ENCOUNTER — TELEPHONE (OUTPATIENT)
Dept: FAMILY MEDICINE | Facility: CLINIC | Age: 28
End: 2023-08-23
Payer: COMMERCIAL

## 2023-08-23 NOTE — TELEPHONE ENCOUNTER
Patient is calling and asking if Dr Meredith would keep prescribing medications (adderall) and lexapro without having appointments. Patients insurance changed and [facility] is out of network so they would have to pay $1000 per office appointment.  Patient will be getting new insurance come January and will make sure [facility] is in network so would be able to resume having appointments in January.  Please call patient back.    VERONIKA Major  [facility]

## 2023-08-23 NOTE — TELEPHONE ENCOUNTER
Insurance coverage and Billing is not in my scope-please forward to appropriate person.  He was seen in march and does not need a visit until 9/23. I am personally OK filling until only January. I have seen him for a long time and has followed recommendations and have been compliant so I am comfortable until January.   Typically we have to see patients 2 times a year based on guideline for controlled substance management.  This is what every provider is doing. He can also do  Do telephone visits/evisits  and doing one in person for his physical..      Katt Meredith D.O.

## 2023-08-28 DIAGNOSIS — J45.990 EXERCISE-INDUCED ASTHMA: ICD-10-CM

## 2023-08-28 RX ORDER — ALBUTEROL SULFATE 90 UG/1
AEROSOL, METERED RESPIRATORY (INHALATION)
Qty: 18 G | Refills: 0 | Status: SHIPPED | OUTPATIENT
Start: 2023-08-28 | End: 2023-11-22

## 2023-09-29 DIAGNOSIS — F90.0 ATTENTION DEFICIT HYPERACTIVITY DISORDER (ADHD), PREDOMINANTLY INATTENTIVE TYPE: ICD-10-CM

## 2023-09-30 RX ORDER — DEXTROAMPHETAMINE SULFATE, DEXTROAMPHETAMINE SACCHARATE, AMPHETAMINE SULFATE AND AMPHETAMINE ASPARTATE 7.5; 7.5; 7.5; 7.5 MG/1; MG/1; MG/1; MG/1
CAPSULE, EXTENDED RELEASE ORAL
Qty: 30 CAPSULE | Refills: 0 | Status: SHIPPED | OUTPATIENT
Start: 2023-09-30 | End: 2023-10-31

## 2023-10-02 DIAGNOSIS — F41.1 GENERALIZED ANXIETY DISORDER: ICD-10-CM

## 2023-10-02 RX ORDER — ESCITALOPRAM OXALATE 10 MG/1
10 TABLET ORAL DAILY
Qty: 90 TABLET | Refills: 0 | Status: SHIPPED | OUTPATIENT
Start: 2023-10-02 | End: 2023-12-11

## 2023-10-31 DIAGNOSIS — F90.0 ATTENTION DEFICIT HYPERACTIVITY DISORDER (ADHD), PREDOMINANTLY INATTENTIVE TYPE: ICD-10-CM

## 2023-10-31 RX ORDER — DEXTROAMPHETAMINE SULFATE, DEXTROAMPHETAMINE SACCHARATE, AMPHETAMINE SULFATE AND AMPHETAMINE ASPARTATE 7.5; 7.5; 7.5; 7.5 MG/1; MG/1; MG/1; MG/1
CAPSULE, EXTENDED RELEASE ORAL
Qty: 30 CAPSULE | Refills: 0 | Status: SHIPPED | OUTPATIENT
Start: 2023-11-01 | End: 2023-12-04

## 2023-10-31 NOTE — TELEPHONE ENCOUNTER
Patient reports lost insurance and would follow up in jan but no appoint made  I book out so would recommend appoint  Ok to refill until then  Katt Meredith D.O.

## 2023-11-10 NOTE — TELEPHONE ENCOUNTER
Called patient and left a voicemail message to call the clinic and schedule an Annual physical/ med check appointment in January.    Dana Victor

## 2023-11-20 NOTE — TELEPHONE ENCOUNTER
Patient is scheduled for physical with Dr Meredith in January.    VERONIKA Major  [facility]

## 2023-11-21 DIAGNOSIS — J45.990 EXERCISE-INDUCED ASTHMA: ICD-10-CM

## 2023-11-22 RX ORDER — ALBUTEROL SULFATE 90 UG/1
AEROSOL, METERED RESPIRATORY (INHALATION)
Qty: 18 G | Refills: 0 | Status: SHIPPED | OUTPATIENT
Start: 2023-11-22 | End: 2023-12-11

## 2023-12-01 DIAGNOSIS — F90.0 ATTENTION DEFICIT HYPERACTIVITY DISORDER (ADHD), PREDOMINANTLY INATTENTIVE TYPE: ICD-10-CM

## 2023-12-04 RX ORDER — DEXTROAMPHETAMINE SULFATE, DEXTROAMPHETAMINE SACCHARATE, AMPHETAMINE SULFATE AND AMPHETAMINE ASPARTATE 7.5; 7.5; 7.5; 7.5 MG/1; MG/1; MG/1; MG/1
CAPSULE, EXTENDED RELEASE ORAL
Qty: 30 CAPSULE | Refills: 0 | Status: SHIPPED | OUTPATIENT
Start: 2023-12-04

## 2023-12-11 ENCOUNTER — OFFICE VISIT (OUTPATIENT)
Dept: FAMILY MEDICINE | Facility: CLINIC | Age: 28
End: 2023-12-11

## 2023-12-11 VITALS
DIASTOLIC BLOOD PRESSURE: 74 MMHG | WEIGHT: 189 LBS | HEIGHT: 70 IN | BODY MASS INDEX: 27.06 KG/M2 | TEMPERATURE: 98 F | HEART RATE: 78 BPM | OXYGEN SATURATION: 100 % | RESPIRATION RATE: 16 BRPM | SYSTOLIC BLOOD PRESSURE: 126 MMHG

## 2023-12-11 DIAGNOSIS — Z00.00 ROUTINE GENERAL MEDICAL EXAMINATION AT A HEALTH CARE FACILITY: Primary | ICD-10-CM

## 2023-12-11 DIAGNOSIS — Z13.220 LIPID SCREENING: ICD-10-CM

## 2023-12-11 DIAGNOSIS — Z13.1 SCREENING FOR DIABETES MELLITUS: ICD-10-CM

## 2023-12-11 DIAGNOSIS — F90.0 ATTENTION DEFICIT HYPERACTIVITY DISORDER (ADHD), PREDOMINANTLY INATTENTIVE TYPE: ICD-10-CM

## 2023-12-11 DIAGNOSIS — J45.990 EXERCISE-INDUCED ASTHMA: ICD-10-CM

## 2023-12-11 DIAGNOSIS — F41.1 GENERALIZED ANXIETY DISORDER: ICD-10-CM

## 2023-12-11 DIAGNOSIS — R73.9 ELEVATED BLOOD SUGAR: ICD-10-CM

## 2023-12-11 LAB
ALBUMIN SERPL BCG-MCNC: 4.8 G/DL (ref 3.5–5.2)
ALP SERPL-CCNC: 69 U/L (ref 40–150)
ALT SERPL W P-5'-P-CCNC: 35 U/L (ref 0–70)
ANION GAP SERPL CALCULATED.3IONS-SCNC: 8 MMOL/L (ref 7–15)
AST SERPL W P-5'-P-CCNC: 24 U/L (ref 0–45)
BASOPHILS # BLD AUTO: 0 10E3/UL (ref 0–0.2)
BASOPHILS NFR BLD AUTO: 0 %
BILIRUB SERPL-MCNC: 0.3 MG/DL
BUN SERPL-MCNC: 16 MG/DL (ref 6–20)
CALCIUM SERPL-MCNC: 9.6 MG/DL (ref 8.6–10)
CHLORIDE SERPL-SCNC: 105 MMOL/L (ref 98–107)
CHOLEST SERPL-MCNC: 169 MG/DL
CREAT SERPL-MCNC: 1.1 MG/DL (ref 0.67–1.17)
DEPRECATED HCO3 PLAS-SCNC: 27 MMOL/L (ref 22–29)
EGFRCR SERPLBLD CKD-EPI 2021: >90 ML/MIN/1.73M2
EOSINOPHIL # BLD AUTO: 0.1 10E3/UL (ref 0–0.7)
EOSINOPHIL NFR BLD AUTO: 2 %
ERYTHROCYTE [DISTWIDTH] IN BLOOD BY AUTOMATED COUNT: 12.6 % (ref 10–15)
FASTING STATUS PATIENT QL REPORTED: YES
GLUCOSE SERPL-MCNC: 104 MG/DL (ref 70–99)
HBA1C MFR BLD: 5.6 % (ref 0–5.6)
HCT VFR BLD AUTO: 45.3 % (ref 40–53)
HDLC SERPL-MCNC: 47 MG/DL
HGB BLD-MCNC: 14.7 G/DL (ref 13.3–17.7)
IMM GRANULOCYTES # BLD: 0 10E3/UL
IMM GRANULOCYTES NFR BLD: 0 %
LDLC SERPL CALC-MCNC: 110 MG/DL
LYMPHOCYTES # BLD AUTO: 1.3 10E3/UL (ref 0.8–5.3)
LYMPHOCYTES NFR BLD AUTO: 26 %
MCH RBC QN AUTO: 27.9 PG (ref 26.5–33)
MCHC RBC AUTO-ENTMCNC: 32.5 G/DL (ref 31.5–36.5)
MCV RBC AUTO: 86 FL (ref 78–100)
MONOCYTES # BLD AUTO: 0.4 10E3/UL (ref 0–1.3)
MONOCYTES NFR BLD AUTO: 8 %
NEUTROPHILS # BLD AUTO: 3.2 10E3/UL (ref 1.6–8.3)
NEUTROPHILS NFR BLD AUTO: 64 %
NONHDLC SERPL-MCNC: 122 MG/DL
PLATELET # BLD AUTO: 307 10E3/UL (ref 150–450)
POTASSIUM SERPL-SCNC: 4 MMOL/L (ref 3.4–5.3)
PROT SERPL-MCNC: 7.1 G/DL (ref 6.4–8.3)
RBC # BLD AUTO: 5.27 10E6/UL (ref 4.4–5.9)
SODIUM SERPL-SCNC: 140 MMOL/L (ref 135–145)
TRIGL SERPL-MCNC: 59 MG/DL
WBC # BLD AUTO: 5.1 10E3/UL (ref 4–11)

## 2023-12-11 PROCEDURE — 83036 HEMOGLOBIN GLYCOSYLATED A1C: CPT | Performed by: FAMILY MEDICINE

## 2023-12-11 PROCEDURE — 90686 IIV4 VACC NO PRSV 0.5 ML IM: CPT | Performed by: FAMILY MEDICINE

## 2023-12-11 PROCEDURE — 80061 LIPID PANEL: CPT | Performed by: FAMILY MEDICINE

## 2023-12-11 PROCEDURE — 36415 COLL VENOUS BLD VENIPUNCTURE: CPT | Performed by: FAMILY MEDICINE

## 2023-12-11 PROCEDURE — 80053 COMPREHEN METABOLIC PANEL: CPT | Performed by: FAMILY MEDICINE

## 2023-12-11 PROCEDURE — 99214 OFFICE O/P EST MOD 30 MIN: CPT | Mod: 25 | Performed by: FAMILY MEDICINE

## 2023-12-11 PROCEDURE — 99395 PREV VISIT EST AGE 18-39: CPT | Mod: 25 | Performed by: FAMILY MEDICINE

## 2023-12-11 PROCEDURE — 85025 COMPLETE CBC W/AUTO DIFF WBC: CPT | Performed by: FAMILY MEDICINE

## 2023-12-11 PROCEDURE — 90471 IMMUNIZATION ADMIN: CPT | Performed by: FAMILY MEDICINE

## 2023-12-11 RX ORDER — ESCITALOPRAM OXALATE 10 MG/1
10 TABLET ORAL DAILY
Qty: 90 TABLET | Refills: 3 | Status: SHIPPED | OUTPATIENT
Start: 2023-12-11 | End: 2024-08-28

## 2023-12-11 RX ORDER — DEXTROAMPHETAMINE SACCHARATE, AMPHETAMINE ASPARTATE MONOHYDRATE, DEXTROAMPHETAMINE SULFATE AND AMPHETAMINE SULFATE 7.5; 7.5; 7.5; 7.5 MG/1; MG/1; MG/1; MG/1
30 CAPSULE, EXTENDED RELEASE ORAL DAILY
Qty: 30 CAPSULE | Refills: 0 | Status: SHIPPED | OUTPATIENT
Start: 2024-02-02

## 2023-12-11 RX ORDER — ALBUTEROL SULFATE 90 UG/1
AEROSOL, METERED RESPIRATORY (INHALATION)
Qty: 18 G | Refills: 3 | Status: SHIPPED | OUTPATIENT
Start: 2023-12-11

## 2023-12-11 RX ORDER — DEXTROAMPHETAMINE SACCHARATE, AMPHETAMINE ASPARTATE MONOHYDRATE, DEXTROAMPHETAMINE SULFATE AND AMPHETAMINE SULFATE 7.5; 7.5; 7.5; 7.5 MG/1; MG/1; MG/1; MG/1
30 CAPSULE, EXTENDED RELEASE ORAL DAILY
Qty: 30 CAPSULE | Refills: 0 | Status: SHIPPED | OUTPATIENT
Start: 2024-01-04

## 2023-12-11 RX ORDER — DEXTROAMPHETAMINE SACCHARATE, AMPHETAMINE ASPARTATE MONOHYDRATE, DEXTROAMPHETAMINE SULFATE AND AMPHETAMINE SULFATE 7.5; 7.5; 7.5; 7.5 MG/1; MG/1; MG/1; MG/1
30 CAPSULE, EXTENDED RELEASE ORAL DAILY
Qty: 30 CAPSULE | Refills: 0 | Status: SHIPPED | OUTPATIENT
Start: 2024-03-04 | End: 2024-04-07

## 2023-12-11 RX ORDER — FLUTICASONE PROPIONATE 220 UG/1
1 AEROSOL, METERED RESPIRATORY (INHALATION) 2 TIMES DAILY
Qty: 12 G | Refills: 3 | Status: SHIPPED | OUTPATIENT
Start: 2023-12-11 | End: 2024-08-28

## 2023-12-11 ASSESSMENT — ENCOUNTER SYMPTOMS
CHILLS: 0
DYSURIA: 0
SHORTNESS OF BREATH: 0
MYALGIAS: 1
NERVOUS/ANXIOUS: 0
FREQUENCY: 0
EYE PAIN: 0
ARTHRALGIAS: 0
CONSTIPATION: 0
DIARRHEA: 0
JOINT SWELLING: 0
PARESTHESIAS: 0
HEMATOCHEZIA: 0
WEAKNESS: 0
PALPITATIONS: 0
HEADACHES: 0
DIZZINESS: 0
HEMATURIA: 0
ABDOMINAL PAIN: 0
COUGH: 0
NAUSEA: 0
FEVER: 0
HEARTBURN: 0
SORE THROAT: 0

## 2023-12-11 ASSESSMENT — ASTHMA QUESTIONNAIRES: ACT_TOTALSCORE: 19

## 2023-12-11 ASSESSMENT — PAIN SCALES - GENERAL: PAINLEVEL: NO PAIN (0)

## 2023-12-11 NOTE — PROGRESS NOTES
"SUBJECTIVE:   Patrick is a 28 year old, presenting for the following:  Physical        12/11/2023     8:14 AM   Additional Questions   Roomed by Rosa Maria       Healthy Habits:     Getting at least 3 servings of Calcium per day:  NO    Bi-annual eye exam:  NO    Dental care twice a year:  NO    Sleep apnea or symptoms of sleep apnea:  None    Diet:  Regular (no restrictions)    Frequency of exercise:  1 day/week    Duration of exercise:  15-30 minutes    Taking medications regularly:  Yes    Medication side effects:  None    Additional concerns today:  No    Patient would like flu shot, completed    Patient uses albuterol on daily basis, non smoker. He was using steriod a long time ago. No illnesses,    Adhd-managed well, taking meds regularily, no side effects    Anxiety-well controlled on  low dose lexapro    Does not drink, he has had AA in the past      Social History     Tobacco Use    Smoking status: Never     Passive exposure: Never    Smokeless tobacco: Never   Substance Use Topics    Alcohol use: Yes     Comment: as 04/25/2022 been 5 months sober             12/11/2023     8:13 AM   Alcohol Use   Prescreen: >3 drinks/day or >7 drinks/week? No       Last PSA: No results found for: \"PSA\"    Reviewed orders with patient. Reviewed health maintenance and updated orders accordingly - Yes      BP Readings from Last 3 Encounters:   12/11/23 126/74   03/07/23 139/84   10/24/22 122/70    Wt Readings from Last 3 Encounters:   12/11/23 85.7 kg (189 lb)   03/07/23 82.1 kg (181 lb)   10/24/22 81.2 kg (179 lb)                    Reviewed and updated as needed this visit by clinical staff   Tobacco  Allergies  Meds              Reviewed and updated as needed this visit by Provider                 Past Medical History:   Diagnosis Date    Kidney stone 2018    Left      No past surgical history on file.  OB History   No obstetric history on file.       Review of Systems   Constitutional:  Negative for chills and fever. " "  HENT:  Negative for congestion, ear pain, hearing loss and sore throat.    Eyes:  Negative for pain and visual disturbance.   Respiratory:  Negative for cough and shortness of breath.    Cardiovascular:  Negative for chest pain, palpitations and peripheral edema.   Gastrointestinal:  Negative for abdominal pain, constipation, diarrhea, heartburn, hematochezia and nausea.   Genitourinary:  Negative for dysuria, frequency, genital sores, hematuria and urgency.   Musculoskeletal:  Positive for myalgias. Negative for arthralgias and joint swelling.   Skin:  Negative for rash.   Neurological:  Negative for dizziness, weakness, headaches and paresthesias.   Psychiatric/Behavioral:  Negative for mood changes. The patient is not nervous/anxious.      CONSTITUTIONAL: NEGATIVE for fever, chills, change in weight  INTEGUMENTARY/SKIN: NEGATIVE for worrisome rashes, moles or lesions  EYES: NEGATIVE for vision changes or irritation  ENT: NEGATIVE for ear, mouth and throat problems  RESP: NEGATIVE for significant cough or SOB  CV: NEGATIVE for chest pain, palpitations or peripheral edema  GI: NEGATIVE for nausea, abdominal pain, heartburn, or change in bowel habits   male: negative for dysuria, hematuria, decreased urinary stream, erectile dysfunction, urethral discharge  MUSCULOSKELETAL: NEGATIVE for significant arthralgias or myalgia  NEURO: NEGATIVE for weakness, dizziness or paresthesias  PSYCHIATRIC: NEGATIVE for changes in mood or affect    OBJECTIVE:   /74   Pulse 78   Temp 98  F (36.7  C) (Oral)   Resp 16   Ht 1.778 m (5' 10\")   Wt 85.7 kg (189 lb)   SpO2 100%   BMI 27.12 kg/m      Physical Exam  GENERAL: healthy, alert and no distress  EYES: Eyes grossly normal to inspection, PERRL and conjunctivae and sclerae normal  HENT: ear canals and TM's normal, nose and mouth without ulcers or lesions  NECK: no adenopathy, no asymmetry, masses, or scars and thyroid normal to palpation  RESP: lungs clear to " auscultation - no rales, rhonchi or wheezes  CV: regular rate and rhythm, normal S1 S2, no S3 or S4, no murmur, click or rub, no peripheral edema and peripheral pulses strong  ABDOMEN: soft, nontender, no hepatosplenomegaly, no masses and bowel sounds normal  MS: no gross musculoskeletal defects noted, no edema  SKIN: no suspicious lesions or rashes  NEURO: Normal strength and tone, mentation intact and speech normal  PSYCH: mentation appears normal, affect normal/bright    Results for orders placed or performed in visit on 12/11/23   Hemoglobin A1c     Status: Normal   Result Value Ref Range    Hemoglobin A1C 5.6 0.0 - 5.6 %   CBC with platelets and differential     Status: None   Result Value Ref Range    WBC Count 5.1 4.0 - 11.0 10e3/uL    RBC Count 5.27 4.40 - 5.90 10e6/uL    Hemoglobin 14.7 13.3 - 17.7 g/dL    Hematocrit 45.3 40.0 - 53.0 %    MCV 86 78 - 100 fL    MCH 27.9 26.5 - 33.0 pg    MCHC 32.5 31.5 - 36.5 g/dL    RDW 12.6 10.0 - 15.0 %    Platelet Count 307 150 - 450 10e3/uL    % Neutrophils 64 %    % Lymphocytes 26 %    % Monocytes 8 %    % Eosinophils 2 %    % Basophils 0 %    % Immature Granulocytes 0 %    Absolute Neutrophils 3.2 1.6 - 8.3 10e3/uL    Absolute Lymphocytes 1.3 0.8 - 5.3 10e3/uL    Absolute Monocytes 0.4 0.0 - 1.3 10e3/uL    Absolute Eosinophils 0.1 0.0 - 0.7 10e3/uL    Absolute Basophils 0.0 0.0 - 0.2 10e3/uL    Absolute Immature Granulocytes 0.0 <=0.4 10e3/uL   CBC with platelets and differential     Status: None    Narrative    The following orders were created for panel order CBC with platelets and differential.  Procedure                               Abnormality         Status                     ---------                               -----------         ------                     CBC with platelets and d...[[id number]]                      Final result                 Please view results for these tests on the individual orders.         ASSESSMENT/PLAN:     1. Routine general  "medical examination at a health care facility  Doing ok, declined vaccine  - CBC with platelets and differential; Future    2. Exercise-induced asthma  Using albuterol daily, advised steriod inhaler as symptoms are not controlled. Flu shot updated.  Risks and benefits of med reviewed  - albuterol (VENTOLIN HFA) 108 (90 Base) MCG/ACT inhaler; INHALE 2 PUFFS INTO THE LUNGS EVERY 6 HOURS AS NEEDED FOR SHORTNESS OF BREATH, DIFFICULTY BREATHING OR WHEEZING.  Dispense: 18 g; Refill: 3  - fluticasone (FLOVENT HFA) 220 MCG/ACT inhaler; Inhale 1 puff into the lungs 2 times daily  Dispense: 12 g; Refill: 3    3. Attention deficit hyperactivity disorder (ADHD), predominantly inattentive type  Stable on current med, continue    4. Generalized anxiety disorder  Stable on med  - escitalopram (LEXAPRO) 10 MG tablet; Take 1 tablet (10 mg) by mouth daily  Dispense: 90 tablet; Refill: 3    5. Screening for diabetes mellitus    - Comprehensive metabolic panel (BMP + Alb, Alk Phos, ALT, AST, Total. Bili, TP); Future  - CBC with platelets and differential; Future    6. Lipid screening    - Lipid panel reflex to direct LDL Fasting; Future    7. Elevated blood sugar  Normal test today  - Hemoglobin A1c; Future    Follow up in 6 months      Patient has been advised of split billing requirements and indicates understanding: Yes      COUNSELING:   Reviewed preventive health counseling, as reflected in patient instructions       Regular exercise       Vision screening       Hearing screening       Family planning      BMI:   Estimated body mass index is 27.12 kg/m  as calculated from the following:    Height as of this encounter: 1.778 m (5' 10\").    Weight as of this encounter: 85.7 kg (189 lb).   Weight management plan: Patient referred to endocrine and/or weight management specialty      He reports that he has never smoked. He has never been exposed to tobacco smoke. He has never used smokeless tobacco.            Katt Meredith, "   [facility]

## 2023-12-11 NOTE — PATIENT INSTRUCTIONS
Patient Education     Preventive Health Recommendations  Male Ages 26 - 39    Yearly exam:             See your health care provider every year in order to  o   Review health changes.   o   Discuss preventive care.    o   Review your medicines if your doctor has prescribed any.  You should be tested each year for STDs (sexually transmitted diseases), if you re at risk.   After age 35, talk to your provider about cholesterol testing. If you are at risk for heart disease, have your cholesterol tested at least every 5 years.   If you are at risk for diabetes, you should have a diabetes test (fasting glucose).  Shots: Get a flu shot each year. Get a tetanus shot every 10 years.     Nutrition:  Eat at least 5 servings of fruits and vegetables daily.   Eat whole-grain bread, whole-wheat pasta and brown rice instead of white grains and rice.   Get adequate Calcium and Vitamin D.     Lifestyle  Exercise for at least 150 minutes a week (30 minutes a day, 5 days a week). This will help you control your weight and prevent disease.   Limit alcohol to one drink per day.   No smoking.   Wear sunscreen to prevent skin cancer.   See your dentist every six months for an exam and cleaning.

## 2023-12-13 NOTE — RESULT ENCOUNTER NOTE
All your results are essential normal.  Please contact me if you have any questions.  Take care,  Katt Meredith D.O.

## 2024-04-05 DIAGNOSIS — F90.0 ATTENTION DEFICIT HYPERACTIVITY DISORDER (ADHD), PREDOMINANTLY INATTENTIVE TYPE: ICD-10-CM

## 2024-04-07 RX ORDER — DEXTROAMPHETAMINE SACCHARATE, AMPHETAMINE ASPARTATE MONOHYDRATE, DEXTROAMPHETAMINE SULFATE AND AMPHETAMINE SULFATE 7.5; 7.5; 7.5; 7.5 MG/1; MG/1; MG/1; MG/1
CAPSULE, EXTENDED RELEASE ORAL
Qty: 30 CAPSULE | Refills: 0 | Status: SHIPPED | OUTPATIENT
Start: 2024-04-07 | End: 2024-05-06

## 2024-05-06 DIAGNOSIS — F90.0 ATTENTION DEFICIT HYPERACTIVITY DISORDER (ADHD), PREDOMINANTLY INATTENTIVE TYPE: ICD-10-CM

## 2024-05-06 RX ORDER — DEXTROAMPHETAMINE SACCHARATE, AMPHETAMINE ASPARTATE MONOHYDRATE, DEXTROAMPHETAMINE SULFATE AND AMPHETAMINE SULFATE 7.5; 7.5; 7.5; 7.5 MG/1; MG/1; MG/1; MG/1
30 CAPSULE, EXTENDED RELEASE ORAL DAILY
Qty: 30 CAPSULE | Refills: 0 | Status: SHIPPED | OUTPATIENT
Start: 2024-05-06 | End: 2024-06-06

## 2024-06-06 DIAGNOSIS — F90.0 ATTENTION DEFICIT HYPERACTIVITY DISORDER (ADHD), PREDOMINANTLY INATTENTIVE TYPE: ICD-10-CM

## 2024-06-06 RX ORDER — DEXTROAMPHETAMINE SACCHARATE, AMPHETAMINE ASPARTATE MONOHYDRATE, DEXTROAMPHETAMINE SULFATE AND AMPHETAMINE SULFATE 7.5; 7.5; 7.5; 7.5 MG/1; MG/1; MG/1; MG/1
30 CAPSULE, EXTENDED RELEASE ORAL DAILY
Qty: 30 CAPSULE | Refills: 0 | Status: SHIPPED | OUTPATIENT
Start: 2024-06-06 | End: 2024-07-15

## 2024-07-10 DIAGNOSIS — F90.0 ATTENTION DEFICIT HYPERACTIVITY DISORDER (ADHD), PREDOMINANTLY INATTENTIVE TYPE: ICD-10-CM

## 2024-07-15 RX ORDER — DEXTROAMPHETAMINE SACCHARATE, AMPHETAMINE ASPARTATE MONOHYDRATE, DEXTROAMPHETAMINE SULFATE AND AMPHETAMINE SULFATE 7.5; 7.5; 7.5; 7.5 MG/1; MG/1; MG/1; MG/1
30 CAPSULE, EXTENDED RELEASE ORAL DAILY
Qty: 30 CAPSULE | Refills: 0 | Status: SHIPPED | OUTPATIENT
Start: 2024-07-15 | End: 2024-08-18

## 2024-08-14 DIAGNOSIS — F90.0 ATTENTION DEFICIT HYPERACTIVITY DISORDER (ADHD), PREDOMINANTLY INATTENTIVE TYPE: ICD-10-CM

## 2024-08-14 NOTE — TELEPHONE ENCOUNTER
He saw psych in 7/24   Ideally He should be managed by them for meds as well, if he wants us to manage him he should have virtual appoint as he is overdue  thanks  Katt Meredith D.O.

## 2024-08-14 NOTE — TELEPHONE ENCOUNTER
RN left message to return call to clinic [phone]  (RN did not leave specific details on voicemail for confidential reasons)    Lisa Baugh RN on 8/14/2024 at 5:46 PM

## 2024-08-16 NOTE — TELEPHONE ENCOUNTER
Dr. Meredith,     Patient rubens for video visit with you 8/21. Did you want to provide michael fill of 5 tabs to get him to the appt date or should he wait until the appt to discuss?    Thanks,  COLLINS Coppola  [facility]

## 2024-08-18 RX ORDER — DEXTROAMPHETAMINE SACCHARATE, AMPHETAMINE ASPARTATE MONOHYDRATE, DEXTROAMPHETAMINE SULFATE AND AMPHETAMINE SULFATE 7.5; 7.5; 7.5; 7.5 MG/1; MG/1; MG/1; MG/1
30 CAPSULE, EXTENDED RELEASE ORAL DAILY
Qty: 30 CAPSULE | Refills: 0 | Status: SHIPPED | OUTPATIENT
Start: 2024-08-18 | End: 2024-09-26

## 2024-08-28 ENCOUNTER — VIRTUAL VISIT (OUTPATIENT)
Dept: FAMILY MEDICINE | Facility: CLINIC | Age: 29
End: 2024-08-28
Payer: COMMERCIAL

## 2024-08-28 DIAGNOSIS — J45.990 EXERCISE-INDUCED ASTHMA: ICD-10-CM

## 2024-08-28 DIAGNOSIS — F41.1 GENERALIZED ANXIETY DISORDER: Primary | ICD-10-CM

## 2024-08-28 DIAGNOSIS — F10.288 ALCOHOL DEPENDENCE WITH OTHER ALCOHOL-INDUCED DISORDER (H): ICD-10-CM

## 2024-08-28 DIAGNOSIS — F90.0 ATTENTION DEFICIT HYPERACTIVITY DISORDER (ADHD), PREDOMINANTLY INATTENTIVE TYPE: ICD-10-CM

## 2024-08-28 PROCEDURE — 99443 PR PHYSICIAN TELEPHONE EVALUATION 21-30 MIN: CPT | Mod: 93 | Performed by: FAMILY MEDICINE

## 2024-08-28 RX ORDER — FLUTICASONE PROPIONATE 220 UG/1
1 AEROSOL, METERED RESPIRATORY (INHALATION) 2 TIMES DAILY
Qty: 12 G | Refills: 3 | Status: SHIPPED | OUTPATIENT
Start: 2024-08-28

## 2024-08-28 RX ORDER — ESCITALOPRAM OXALATE 10 MG/1
10 TABLET ORAL DAILY
Qty: 30 TABLET | Refills: 0 | Status: SHIPPED | OUTPATIENT
Start: 2024-08-28 | End: 2024-10-02

## 2024-08-28 ASSESSMENT — PATIENT HEALTH QUESTIONNAIRE - PHQ9
SUM OF ALL RESPONSES TO PHQ QUESTIONS 1-9: 0
10. IF YOU CHECKED OFF ANY PROBLEMS, HOW DIFFICULT HAVE THESE PROBLEMS MADE IT FOR YOU TO DO YOUR WORK, TAKE CARE OF THINGS AT HOME, OR GET ALONG WITH OTHER PEOPLE: NOT DIFFICULT AT ALL
SUM OF ALL RESPONSES TO PHQ QUESTIONS 1-9: 0
SUM OF ALL RESPONSES TO PHQ QUESTIONS 1-9: 0

## 2024-08-28 ASSESSMENT — ANXIETY QUESTIONNAIRES
7. FEELING AFRAID AS IF SOMETHING AWFUL MIGHT HAPPEN: NOT AT ALL
GAD7 TOTAL SCORE: 0
3. WORRYING TOO MUCH ABOUT DIFFERENT THINGS: NOT AT ALL
2. NOT BEING ABLE TO STOP OR CONTROL WORRYING: NOT AT ALL
4. TROUBLE RELAXING: NOT AT ALL
IF YOU CHECKED OFF ANY PROBLEMS ON THIS QUESTIONNAIRE, HOW DIFFICULT HAVE THESE PROBLEMS MADE IT FOR YOU TO DO YOUR WORK, TAKE CARE OF THINGS AT HOME, OR GET ALONG WITH OTHER PEOPLE: NOT DIFFICULT AT ALL
IF YOU CHECKED OFF ANY PROBLEMS ON THIS QUESTIONNAIRE, HOW DIFFICULT HAVE THESE PROBLEMS MADE IT FOR YOU TO DO YOUR WORK, TAKE CARE OF THINGS AT HOME, OR GET ALONG WITH OTHER PEOPLE: NOT DIFFICULT AT ALL
3. WORRYING TOO MUCH ABOUT DIFFERENT THINGS: NOT AT ALL
7. FEELING AFRAID AS IF SOMETHING AWFUL MIGHT HAPPEN: NOT AT ALL
7. FEELING AFRAID AS IF SOMETHING AWFUL MIGHT HAPPEN: NOT AT ALL
1. FEELING NERVOUS, ANXIOUS, OR ON EDGE: NOT AT ALL
1. FEELING NERVOUS, ANXIOUS, OR ON EDGE: NOT AT ALL
6. BECOMING EASILY ANNOYED OR IRRITABLE: NOT AT ALL
5. BEING SO RESTLESS THAT IT IS HARD TO SIT STILL: NOT AT ALL
6. BECOMING EASILY ANNOYED OR IRRITABLE: NOT AT ALL
GAD7 TOTAL SCORE: 0
4. TROUBLE RELAXING: NOT AT ALL
5. BEING SO RESTLESS THAT IT IS HARD TO SIT STILL: NOT AT ALL
GAD7 TOTAL SCORE: 0
8. IF YOU CHECKED OFF ANY PROBLEMS, HOW DIFFICULT HAVE THESE MADE IT FOR YOU TO DO YOUR WORK, TAKE CARE OF THINGS AT HOME, OR GET ALONG WITH OTHER PEOPLE?: NOT DIFFICULT AT ALL
GAD7 TOTAL SCORE: 0
2. NOT BEING ABLE TO STOP OR CONTROL WORRYING: NOT AT ALL

## 2024-08-28 ASSESSMENT — ASTHMA QUESTIONNAIRES
ACT_TOTALSCORE: 19
ACT_TOTALSCORE: 19
QUESTION_3 LAST FOUR WEEKS HOW OFTEN DID YOUR ASTHMA SYMPTOMS (WHEEZING, COUGHING, SHORTNESS OF BREATH, CHEST TIGHTNESS OR PAIN) WAKE YOU UP AT NIGHT OR EARLIER THAN USUAL IN THE MORNING: ONCE OR TWICE
QUESTION_5 LAST FOUR WEEKS HOW WOULD YOU RATE YOUR ASTHMA CONTROL: SOMEWHAT CONTROLLED
QUESTION_4 LAST FOUR WEEKS HOW OFTEN HAVE YOU USED YOUR RESCUE INHALER OR NEBULIZER MEDICATION (SUCH AS ALBUTEROL): ONCE A WEEK OR LESS
QUESTION_2 LAST FOUR WEEKS HOW OFTEN HAVE YOU HAD SHORTNESS OF BREATH: ONCE OR TWICE A WEEK
QUESTION_1 LAST FOUR WEEKS HOW MUCH OF THE TIME DID YOUR ASTHMA KEEP YOU FROM GETTING AS MUCH DONE AT WORK, SCHOOL OR AT HOME: A LITTLE OF THE TIME

## 2024-08-28 NOTE — PROGRESS NOTES
Patrick is a 29 year old who is being evaluated via a billable telephone visit.    What phone number would you like to be contacted at? [phone]  How would you like to obtain your AVS? Kari  Originating Location (pt. Location): Home    Distant Location (provider location):  On-site    1. Generalized anxiety disorder  Patient was out of medication.  Advised refills and restarting it he has recently seen psych for addiction and is advised to get therapy.  Patient is involved in the [employer] and will think about restarting therapy. - escitalopram (LEXAPRO) 10 MG tablet; Take 1 tablet (10 mg) by mouth daily.  Dispense: 30 tablet; Refill: 0    2. Attention deficit hyperactivity disorder (ADHD), predominantly inattentive type   Patient has been on current medication for ADHD for a long time.  Denies any side effects.    3. Alcohol dependence with other alcohol-induced disorder (H)    Patient has had a problem with alcohol addiction for a long time.  He does admit to having some cravings.  He has seen addiction medicine and was prescribed medication to curbCraving.  Strongly advised  starting medication.  He has never had alcohol withdrawal seizures.    4. Exercise-induced asthma  Stable patient would like refills  - fluticasone (FLOVENT HFA) 220 MCG/ACT inhaler; Inhale 1 puff into the lungs 2 times daily.  Dispense: 12 g; Refill: 3    Recommend virtually follow-up in 3-4 weeks      Subjective   Patrick is a 29 year old, presenting for the following health issues:  A.D.H.D        8/28/2024     4:48 PM   Additional Questions   Roomed by Rosa Maria     History of Present Illness       Reason for visit:  ADHD and med check        ED/UC Followup:    Facility:  [facility]   Date of visit: 7/24/24  Reason for visit: alcohol addiction   Current Status:      Now seeing someone  Addiction med for 3 weeks,   No meds   Neuropsych eval coming  Looking into individual therapy  Lexapro for a week    Has not drank in a month, 3 locos-heavy  alhohol content    History of alcohol craving             Review of Systems  Constitutional, HEENT, cardiovascular, pulmonary, GI, , musculoskeletal, neuro, skin, endocrine and psych systems are negative, except as otherwise noted.      Objective           Vitals:  No vitals were obtained today due to virtual visit.    Physical Exam   General: Alert and no distress //Respiratory: No audible wheeze, cough, or shortness of breath // Psychiatric:  Appropriate affect, tone, and pace of words      Phone call duration:-30 minutes  Signed Electronically by: Katt Meredith DO

## 2024-08-29 ENCOUNTER — TELEPHONE (OUTPATIENT)
Dept: FAMILY MEDICINE | Facility: CLINIC | Age: 29
End: 2024-08-29
Payer: COMMERCIAL

## 2024-08-29 NOTE — TELEPHONE ENCOUNTER
Katt Meredith DO [facility] Pool  Video visit in 4 weeks please    Copied from a CC chart.    VERONIKA Major  [facility]

## 2024-08-29 NOTE — TELEPHONE ENCOUNTER
Called patient and left a detailed voicemail message that I was calling to help him schedule a 4 week follow up (can be virtual).    VERONIKA Major  [facility]

## 2024-08-29 NOTE — TELEPHONE ENCOUNTER
Prior Authorization Retail Medication Request    Medication/Dose: Fluticasone inhaler needs refills   Diagnosis and ICD code (if different than what is on RX):  same  New/renewal/insurance change PA/secondary ins. PA:  Previously Tried and Failed:  unknown  Rationale:  same    Insurance   Primary: medica  Insurance ID:  [id number]    Secondary (if applicable):  Insurance ID:      Pharmacy Information (if different than what is on RX)  [facility]   Phone:  [phone]  Fax:

## 2024-08-30 NOTE — TELEPHONE ENCOUNTER
PA Initiation    Medication: FLUTICASONE PROPIONATE  MCG/ACT IN AERO  Insurance Company: [employer] Non-Specialty PA's - Phone [phone] Fax [phone]  Pharmacy Filling the Rx: [facility] - [place], [place] - [address] [place], [place] [address]G[place]  Filling Pharmacy Phone: [phone]  Filling Pharmacy Fax: [phone]  Start Date: 8/30/2024

## 2024-08-30 NOTE — TELEPHONE ENCOUNTER
Prior Authorization Approval    Medication: FLUTICASONE PROPIONATE  MCG/ACT IN AERO  Authorization Effective Date: 7/31/2024  Authorization Expiration Date: 8/30/2025  Approved Dose/Quantity:   Reference #:     Insurance Company: [employer] Non-Specialty PA's - Phone [phone] Fax [phone]  Expected CoPay: $    CoPay Card Available:      Financial Assistance Needed:   Which Pharmacy is filling the prescription: [facility] - [place], [place] - [address] [place] [address].  Pharmacy Notified: YES  Patient Notified: **Instructed pharmacy to notify patient when script is ready to /ship.**

## 2024-09-03 NOTE — TELEPHONE ENCOUNTER
Called patient and left a voicemail message to call the clinic and schedule a virtual follow up appointment in 4 weeks.     Dana Victor

## 2024-09-23 ENCOUNTER — HOSPITAL ENCOUNTER (EMERGENCY)
Facility: CLINIC | Age: 29
Discharge: HOME | End: 2024-09-23
Attending: EMERGENCY MEDICINE | Admitting: EMERGENCY MEDICINE
Payer: COMMERCIAL

## 2024-09-23 VITALS
DIASTOLIC BLOOD PRESSURE: 107 MMHG | BODY MASS INDEX: 27.06 KG/M2 | SYSTOLIC BLOOD PRESSURE: 171 MMHG | HEART RATE: 100 BPM | TEMPERATURE: 98.1 F | RESPIRATION RATE: 16 BRPM | OXYGEN SATURATION: 97 % | HEIGHT: 70 IN | WEIGHT: 189 LBS

## 2024-09-23 DIAGNOSIS — F90.0 ATTENTION DEFICIT HYPERACTIVITY DISORDER (ADHD), PREDOMINANTLY INATTENTIVE TYPE: ICD-10-CM

## 2024-09-23 DIAGNOSIS — Z78.9 ALCOHOL USE: ICD-10-CM

## 2024-09-23 DIAGNOSIS — S01.81XA FACIAL LACERATION, INITIAL ENCOUNTER: ICD-10-CM

## 2024-09-23 DIAGNOSIS — Z72.89 DELIBERATE SELF-CUTTING: ICD-10-CM

## 2024-09-23 PROBLEM — F43.25 ADJUSTMENT DISORDER WITH MIXED DISTURBANCE OF EMOTIONS AND CONDUCT: Status: ACTIVE | Noted: 2024-09-23

## 2024-09-23 PROCEDURE — 99283 EMERGENCY DEPT VISIT LOW MDM: CPT

## 2024-09-23 RX ORDER — IBUPROFEN 800 MG/1
800 TABLET, FILM COATED ORAL EVERY 8 HOURS PRN
Qty: 15 TABLET | Refills: 0 | Status: SHIPPED | OUTPATIENT
Start: 2024-09-23 | End: 2024-10-01

## 2024-09-23 ASSESSMENT — COLUMBIA-SUICIDE SEVERITY RATING SCALE - C-SSRS
6. HAVE YOU EVER DONE ANYTHING, STARTED TO DO ANYTHING, OR PREPARED TO DO ANYTHING TO END YOUR LIFE?: NO
2. HAVE YOU ACTUALLY HAD ANY THOUGHTS OF KILLING YOURSELF IN THE PAST MONTH?: NO
1. IN THE PAST MONTH, HAVE YOU WISHED YOU WERE DEAD OR WISHED YOU COULD GO TO SLEEP AND NOT WAKE UP?: NO

## 2024-09-23 ASSESSMENT — ACTIVITIES OF DAILY LIVING (ADL)
ADLS_ACUITY_SCORE: 35

## 2024-09-23 NOTE — DISCHARGE INSTRUCTIONS
No ointment on the glue  Let it fall off on its own    Discharge Instructions  Mental Health Concerns    You were seen today for mental health concerns, such as depression, anxiety, or suicidal thinking. Your provider feels that you do not require hospitalization at this time. However, your symptoms may become worse, and you may need to return to the Emergency Department. Most treatments of depression and suicidal thoughts are a process rather than a single intervention.  Medications and counseling can take several weeks or more to help.    Generally, every Emergency Department visit should have a follow-up clinic visit with either a primary or a specialty clinic/provider. Please follow-up as instructed by your emergency provider today.    By accepting these discharge instructions:  You promise to not harm yourself or others.  You agree that if you feel you are becoming unable to keep that promise, you will do something to help yourself before you do anything to harm yourself or others.   You agree to keep any safety plan arranged on your visit here today.  You agree to take any medication prescribed or recommended by your provider.  If you are getting worse, you can contact a friend or a family member, contact your counselor or family provider, contact a crisis line, or other options discussed with the provider or therapist today.  At any time, you can call 911 and return to the Emergency Department for more help.  You understand that follow-up is essential to your treatment, and you will make and keep appointments recommended on your visit today.    How to improve your mental health and prevent suicide:  Involve others by letting family, friends, counselors know.  Do not isolate yourself.  Avoid alcohol or drugs. Remove weapons, poisons from your home.  Try to stick to routines for eating, sleeping and getting regular exercise.    Try to get into sunlight. Bright natural light not only treats seasonal affective  disorder but also depression.  Increase safe activities that you enjoy.    If you feel worse, contact [phone] ([phone]), or call 911, or your primary provider/counselor for additional assistance.    If you were given a prescription for medicine here today, be sure to read all of the information (including the package insert) that comes with your prescription.  This will include important information about the medicine, its side effects, and any warnings that you need to know about.  The pharmacist who fills the prescription can provide more information and answer questions you may have about the medicine.  If you have questions or concerns that the pharmacist cannot address, please call or return to the Emergency Department.   Remember that you can always come back to the Emergency Department if you are not able to see your regular provider in the amount of time listed above, if you get any new symptoms, or if there is anything that worries you.

## 2024-09-23 NOTE — ED TRIAGE NOTES
BIBA from home for self inflicted lacerations to face.  Patient states he took a razor to his face to feel something.  Endorses going through a divorce.  Endorses alcohol use tonight.  Denies suicidal or homicidal ideation.     Triage Assessment (Adult)       Row Name 09/23/24 0154          Triage Assessment    Airway WDL WDL        Respiratory WDL    Respiratory WDL WDL        Skin Circulation/Temperature WDL    Skin Circulation/Temperature WDL X  lacerations to right side of face.        Cardiac WDL    Cardiac WDL WDL        Peripheral/Neurovascular WDL    Peripheral Neurovascular WDL WDL        Cognitive/Neuro/Behavioral WDL    Cognitive/Neuro/Behavioral WDL WDL

## 2024-09-23 NOTE — ED NOTES
Bed: ED19  Expected date:   Expected time:   Means of arrival:   Comments:  HEMS 414 29M self harm

## 2024-09-23 NOTE — ED PROVIDER NOTES
"  Emergency Department Note      History of Present Illness     Chief Complaint   Laceration and Alcohol Intoxication      HPI   Patrick Gutierrez is a 29 year old male who presents for alcohol intoxication, self harm. The patient states that he got sad about his recent divorce and after drinking alcohol he went to his room and cut himself in the face with a . States he did this alone, denies any attempt at suicide. He states he does not drink alcohol regularly. He denies any other drug use. States he lives alone and is feeling better know. He does not see a psychiatrist or therapist. He denies any suicidal or homicidal ideation.  Patient reports a history of cutting but has not cut for some time.    Independent Historian   None    Review of External Notes   Tdap was last updated in 2019.    Past Medical History     Medical History and Problem List   Kidney stones   Asthma   ADHD  Celiac disease   Osgood-Schlatter's disease     Medications   Adderall   Lexapro   Vistaril   Revia     Surgical History   No past surgical history on file.    Physical Exam     Patient Vitals for the past 24 hrs:   BP Temp Temp src Pulse Resp SpO2 Height Weight   09/23/24 0156 -- -- -- 100 16 -- -- --   09/23/24 0136 (!) 171/107 98.1  F (36.7  C) Oral -- -- 97 % 1.778 m (5' 10\") 85.7 kg (189 lb)     Physical Exam  General: Sitting up in bed  Eyes:  The pupils are equal and round    Conjunctivae and sclerae are normal  ENT:    Few superficial lacerations on right side of face with no bleeding  Neck:  Normal range of motion  CV:  Regular rate,  regular rhythm     Skin warm and well perfused   Resp:  Non labored breathing on room air    No tachypnea    No cough heard    Lungs clear bilaterally  MS:  Normal muscular tone  Skin:  No rash or acute skin lesions noted  Neuro:   Awake, alert.      Speech is normal and fluent.    Face is symmetric.     Moves all extremities equally  Psych: Normal affect.  Appropriate interactions.    ED " "Course      Procedures   Procedures     Laceration Repair      Procedure: Laceration Repair    Indication: Laceration    Consent: Verbal    Tetanus status reviewed    Location:  Face     Length: 4 cm    Preparation: Irrigation with Sterile Saline.    Anesthesia/Sedation: none       Treatment/Exploration: Wound explored, no foreign bodies found     Closure: The wound was closed with Tissue Adhesive.    Patient Status: The patient tolerated the procedure well: Yes. There were no complications.      Discussion of Management   Discussed patient with DEC  who recommends discharge home    ED Course   ED Course as of 09/23/24 0440   Mon Sep 23, 2024   0210 I initially assessed the patient and obtained the above history and physical exam.    0348 I rechecked the patient and conducted procedure as noted above   0439 I consulted with DEC who recommended the patient be discharged      Medical Decision Making / Diagnosis       FRANKIE Gutierrez is a 29 year old male presented to the emergency department with laceration.  Laceration was irrigated.  It was superficial and not gaping wound,  Skin glue was applied. Patient did not want sutures. Tetanus is up-to-date.  Patient reports that he has a history of cutting and did this not as a suicide attempt but his way to relieve stress and \"feel something\".  DEC Evaluated the patient and recommends discharge home.  Patient has a friend here in the emergency department with him who will stay with him and keep him safe.  She does not think he was actually trying to kill himself.  Discussed wound care at home.  Discussed that this will leave a scar.    Disposition   The patient was discharged.     Diagnosis     ICD-10-CM    1. Facial laceration, initial encounter  S01.81XA       2. Alcohol use  Z78.9       3. Deliberate self-cutting  Z72.89          Scribe Disclosure:  I, Eber Langley, am serving as a scribe at 1:46 AM on 9/23/2024 to document services personally " performed by Naya Villeda MD based on my observations and the provider's statements to me.        Naya Vlileda MD  09/23/24 0553

## 2024-09-23 NOTE — Clinical Note
Patrick Brenda was seen and treated in our emergency department on 9/23/2024.  He may return to work on 09/24/2024.       If you have any questions or concerns, please don't hesitate to call.      Naya Villeda MD
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
## 2024-09-24 ENCOUNTER — PATIENT OUTREACH (OUTPATIENT)
Dept: FAMILY MEDICINE | Facility: CLINIC | Age: 29
End: 2024-09-24
Payer: COMMERCIAL

## 2024-09-24 ENCOUNTER — TELEPHONE (OUTPATIENT)
Dept: BEHAVIORAL HEALTH | Facility: CLINIC | Age: 29
End: 2024-09-24
Payer: COMMERCIAL

## 2024-09-24 NOTE — TELEPHONE ENCOUNTER
RN left  for patient requesting call back to clinic.    RN called to discuss ER follow up    Artemio Stevenson RN, BSN, PHN  [facility]

## 2024-09-24 NOTE — TELEPHONE ENCOUNTER
Patient in ER 9/23    Diagnosis: Facial laceration      Recommended follow up:    No follow up recommended.

## 2024-09-24 NOTE — TELEPHONE ENCOUNTER
Transitions of Care Outreach  Chief Complaint   Patient presents with    Hospital F/U       Most Recent Admission Date: 9/23/2024   Most Recent Admission Diagnosis:      Most Recent Discharge Date: 9/23/2024   Most Recent Discharge Diagnosis: Facial laceration, initial encounter - S01.81XA  Alcohol use - Z78.9  Deliberate self-cutting - Z72.89     Transitions of Care Assessment    Discharge Assessment  How are your symptoms? (Red Flag symptoms escalate to triage hotline per guidelines): Improved  Do you know how to contact your clinic care team if you have future questions or changes to your health status? : Yes  Does the patient have their discharge instructions? : Yes  Does the patient have questions regarding their discharge instructions? : No  Were you started on any new medications or were there changes to any of your previous medications? : No  Does the patient have all of their medications?: Yes  Do you have questions regarding any of your medications? : No  Do you have all of your needed medical supplies or equipment (DME)?  (i.e. oxygen tank, CPAP, cane, etc.): Yes    Follow up Plan     Discharge Follow-Up  Discharge follow up appointment scheduled in alignment with recommended follow up timeframe or Transitions of Risk Category? (Low = within 30 days; Moderate= within 14 days; High= within 7 days): Yes  Discharge Follow Up Appointment Date: 10/02/24  Discharge Follow Up Appointment Scheduled with?: Primary Care Provider    Future Appointments   Date Time Provider Department Center   10/2/2024  5:00 PM Katt Meredith DO [facility]       Outpatient Plan as outlined on AVS reviewed with patient.    For any urgent concerns, please contact our 24 hour nurse triage line: [phone] ([phone])       Christine M Klisch, RN

## 2024-09-26 RX ORDER — DEXTROAMPHETAMINE SACCHARATE, AMPHETAMINE ASPARTATE MONOHYDRATE, DEXTROAMPHETAMINE SULFATE AND AMPHETAMINE SULFATE 7.5; 7.5; 7.5; 7.5 MG/1; MG/1; MG/1; MG/1
30 CAPSULE, EXTENDED RELEASE ORAL DAILY
Qty: 7 CAPSULE | Refills: 0 | Status: SHIPPED | OUTPATIENT
Start: 2024-09-26

## 2024-10-02 ENCOUNTER — VIRTUAL VISIT (OUTPATIENT)
Dept: FAMILY MEDICINE | Facility: CLINIC | Age: 29
End: 2024-10-02
Payer: COMMERCIAL

## 2024-10-02 DIAGNOSIS — F90.0 ATTENTION DEFICIT HYPERACTIVITY DISORDER (ADHD), PREDOMINANTLY INATTENTIVE TYPE: ICD-10-CM

## 2024-10-02 DIAGNOSIS — F41.1 GENERALIZED ANXIETY DISORDER: ICD-10-CM

## 2024-10-02 DIAGNOSIS — Z09 HOSPITAL DISCHARGE FOLLOW-UP: Primary | ICD-10-CM

## 2024-10-02 DIAGNOSIS — F10.10 ALCOHOL ABUSE: ICD-10-CM

## 2024-10-02 PROCEDURE — 99443 PR PHYSICIAN TELEPHONE EVALUATION 21-30 MIN: CPT | Mod: 93 | Performed by: FAMILY MEDICINE

## 2024-10-02 RX ORDER — ESCITALOPRAM OXALATE 10 MG/1
10 TABLET ORAL DAILY
Qty: 30 TABLET | Refills: 0 | Status: SHIPPED | OUTPATIENT
Start: 2024-10-02 | End: 2024-10-08

## 2024-10-02 RX ORDER — ESCITALOPRAM OXALATE 10 MG/1
10 TABLET ORAL DAILY
Qty: 30 TABLET | Refills: 0 | Status: SHIPPED | OUTPATIENT
Start: 2024-10-02 | End: 2024-10-02

## 2024-10-02 RX ORDER — NALTREXONE HYDROCHLORIDE 50 MG/1
50 TABLET, FILM COATED ORAL DAILY
Qty: 30 TABLET | Refills: 0 | Status: SHIPPED | OUTPATIENT
Start: 2024-10-02 | End: 2024-10-02

## 2024-10-02 RX ORDER — DEXTROAMPHETAMINE SACCHARATE, AMPHETAMINE ASPARTATE MONOHYDRATE, DEXTROAMPHETAMINE SULFATE AND AMPHETAMINE SULFATE 7.5; 7.5; 7.5; 7.5 MG/1; MG/1; MG/1; MG/1
30 CAPSULE, EXTENDED RELEASE ORAL DAILY
Qty: 30 CAPSULE | Refills: 0 | Status: SHIPPED | OUTPATIENT
Start: 2024-10-02 | End: 2024-11-04

## 2024-10-02 RX ORDER — DEXTROAMPHETAMINE SACCHARATE, AMPHETAMINE ASPARTATE MONOHYDRATE, DEXTROAMPHETAMINE SULFATE AND AMPHETAMINE SULFATE 7.5; 7.5; 7.5; 7.5 MG/1; MG/1; MG/1; MG/1
30 CAPSULE, EXTENDED RELEASE ORAL DAILY
Qty: 30 CAPSULE | Refills: 0 | Status: SHIPPED | OUTPATIENT
Start: 2024-10-02

## 2024-10-02 RX ORDER — NALTREXONE HYDROCHLORIDE 50 MG/1
50 TABLET, FILM COATED ORAL DAILY
Qty: 30 TABLET | Refills: 0 | Status: SHIPPED | OUTPATIENT
Start: 2024-10-02 | End: 2024-10-08

## 2024-10-02 ASSESSMENT — ASTHMA QUESTIONNAIRES
QUESTION_3 LAST FOUR WEEKS HOW OFTEN DID YOUR ASTHMA SYMPTOMS (WHEEZING, COUGHING, SHORTNESS OF BREATH, CHEST TIGHTNESS OR PAIN) WAKE YOU UP AT NIGHT OR EARLIER THAN USUAL IN THE MORNING: ONCE OR TWICE
ACT_TOTALSCORE: 20
ACT_TOTALSCORE: 20
QUESTION_2 LAST FOUR WEEKS HOW OFTEN HAVE YOU HAD SHORTNESS OF BREATH: ONCE OR TWICE A WEEK
QUESTION_5 LAST FOUR WEEKS HOW WOULD YOU RATE YOUR ASTHMA CONTROL: WELL CONTROLLED
QUESTION_1 LAST FOUR WEEKS HOW MUCH OF THE TIME DID YOUR ASTHMA KEEP YOU FROM GETTING AS MUCH DONE AT WORK, SCHOOL OR AT HOME: A LITTLE OF THE TIME
QUESTION_4 LAST FOUR WEEKS HOW OFTEN HAVE YOU USED YOUR RESCUE INHALER OR NEBULIZER MEDICATION (SUCH AS ALBUTEROL): ONCE A WEEK OR LESS

## 2024-10-02 NOTE — PROGRESS NOTES
Patrick is a 29 year old who is being evaluated via a billable telephone visit.    What phone number would you like to be contacted at? [phone]  How would you like to obtain your AVS? Mail a copy  Originating Location (pt. Location): Home    Distant Location (provider location):  On-site    1. Hospital discharge follow-up  Reviewed notes, intentional cutting after drinking, repaired in hospital, per patient healing well    2. Alcohol abuse  He has had issues with alcohol abuse, especially recently, advised medication like naltrexone, risks and benefits reviewed  Advised labs in one month, he needs to be seen in person at that time, follow up with psych as planed  - Adult Mental Health  Referral; Future  - naltrexone (DEPADE/REVIA) 50 MG tablet; Take 1 tablet (50 mg) by mouth daily.  Dispense: 30 tablet; Refill: 0    3. Attention deficit hyperactivity disorder (ADHD), predominantly inattentive type  He has been on med for a long time, refilled for now  - Adult Mental Health  Referral; Future  - amphetamine-dextroamphetamine (ADDERALL XR) 30 MG 24 hr capsule; Take 1 capsule (30 mg) by mouth daily.  Dispense: 30 capsule; Refill: 0  - amphetamine-dextroamphetamine (ADDERALL XR) 30 MG 24 hr capsule; Take 1 capsule (30 mg) by mouth daily.  Dispense: 30 capsule; Refill: 0    4. Generalized anxiety disorder  Lexapro is helping him , he is going to support group at [employer], after his insurance is sorted out he will go to psych and therapist.   - escitalopram (LEXAPRO) 10 MG tablet; Take 1 tablet (10 mg) by mouth daily.  Dispense: 30 tablet; Refill: 0        Subjective   Patrick is a 29 year old, presenting for the following health issues:  A.D.H.D      10/2/2024     6:53 AM   Additional Questions   Roomed by Rosa Maria JOHN     ED/UC Followup:    Facility:  [facility]  Date of visit: 9/23/24  Reason for visit: facial laceration  Current Status: wound healing  Wound is healing well he has not seen anyone  yet    Working on insurance  Was in therapy and saw psych   Since 8/30   Working now  Moved out  Living alone  No suicidal or active homicidal ideation now  Working  He is open to meds for alcohol abuse  He does go to [employer] support groups        Review of Systems  Constitutional, HEENT, cardiovascular, pulmonary, GI, , musculoskeletal, neuro, skin, endocrine and psych systems are negative, except as otherwise noted.      Objective           Vitals:  No vitals were obtained today due to virtual visit.    Physical Exam   General: Alert and no distress //Respiratory: No audible wheeze, cough, or shortness of breath // Psychiatric:  Appropriate affect, tone, and pace of words            Phone call duration: 22 minutes  Signed Electronically by: Katt Meredith DO

## 2024-10-03 ENCOUNTER — TELEPHONE (OUTPATIENT)
Dept: FAMILY MEDICINE | Facility: CLINIC | Age: 29
End: 2024-10-03
Payer: COMMERCIAL

## 2024-10-03 NOTE — TELEPHONE ENCOUNTER
Called patient and left a voicemail message to call the clinic and schedule an in person follow up appointment with Dr Meredith.    Dana Victor

## 2024-10-03 NOTE — TELEPHONE ENCOUNTER
Katt Meredith DO [facility] [place]  Follow up in 4-6 weeks, prefer in person    Copied from CC chart.    VERONIKA Major  [facility]

## 2024-10-04 ENCOUNTER — ALLIED HEALTH/NURSE VISIT (OUTPATIENT)
Dept: FAMILY MEDICINE | Facility: CLINIC | Age: 29
End: 2024-10-04
Payer: COMMERCIAL

## 2024-10-04 VITALS — DIASTOLIC BLOOD PRESSURE: 96 MMHG | SYSTOLIC BLOOD PRESSURE: 145 MMHG

## 2024-10-04 DIAGNOSIS — I10 HTN (HYPERTENSION): Primary | ICD-10-CM

## 2024-10-04 PROCEDURE — 99207 PR NO CHARGE NURSE ONLY: CPT | Performed by: FAMILY MEDICINE

## 2024-10-04 NOTE — PROGRESS NOTES
Patrick Gutierrez was evaluated at [facility] on October 4, 2024 at which time his blood pressure was:    BP Readings from Last 1 Encounters:   10/04/24 (!) 145/96     No data recorded      Reviewed lifestyle modifications for blood pressure control and reduction: including making healthy food choices, managing weight, getting regular exercise, smoking cessation, reducing alcohol consumption, monitoring blood pressure regularly.     Symptoms: None    BP Goal:< 140/90 mmHg    BP Assessment:  BP too high    Potential Reasons for BP too high: NA - Not applicable    BP Follow-Up Plan: Referral to PCP    Recommendation to Provider: Had some Mtn Dew and had taken his adderall. May have elevated BP. Mentioned to come on in prior to any stimulants for a more accurate reading.     Note completed by: Andrzej Chino RPH

## 2024-10-08 ENCOUNTER — VIRTUAL VISIT (OUTPATIENT)
Dept: PSYCHIATRY | Facility: CLINIC | Age: 29
End: 2024-10-08
Attending: FAMILY MEDICINE
Payer: COMMERCIAL

## 2024-10-08 ENCOUNTER — VIRTUAL VISIT (OUTPATIENT)
Dept: BEHAVIORAL HEALTH | Facility: CLINIC | Age: 29
End: 2024-10-08
Attending: FAMILY MEDICINE
Payer: COMMERCIAL

## 2024-10-08 DIAGNOSIS — F90.0 ATTENTION DEFICIT HYPERACTIVITY DISORDER (ADHD), PREDOMINANTLY INATTENTIVE TYPE: ICD-10-CM

## 2024-10-08 DIAGNOSIS — F41.1 GENERALIZED ANXIETY DISORDER: ICD-10-CM

## 2024-10-08 DIAGNOSIS — F10.10 ALCOHOL ABUSE: ICD-10-CM

## 2024-10-08 DIAGNOSIS — F41.9 ANXIETY: ICD-10-CM

## 2024-10-08 DIAGNOSIS — F43.21 ADJUSTMENT DISORDER WITH DEPRESSED MOOD: Primary | ICD-10-CM

## 2024-10-08 DIAGNOSIS — F10.21 ALCOHOL USE DISORDER, SEVERE, IN EARLY REMISSION (H): Primary | ICD-10-CM

## 2024-10-08 DIAGNOSIS — F32.1 CURRENT MODERATE EPISODE OF MAJOR DEPRESSIVE DISORDER WITHOUT PRIOR EPISODE (H): ICD-10-CM

## 2024-10-08 PROCEDURE — 90791 PSYCH DIAGNOSTIC EVALUATION: CPT | Mod: 95 | Performed by: COUNSELOR

## 2024-10-08 PROCEDURE — 99204 OFFICE O/P NEW MOD 45 MIN: CPT | Mod: 95 | Performed by: PSYCHIATRY & NEUROLOGY

## 2024-10-08 PROCEDURE — G2211 COMPLEX E/M VISIT ADD ON: HCPCS | Mod: 95 | Performed by: PSYCHIATRY & NEUROLOGY

## 2024-10-08 RX ORDER — ESCITALOPRAM OXALATE 10 MG/1
10 TABLET ORAL DAILY
Qty: 30 TABLET | Refills: 2 | Status: SHIPPED | OUTPATIENT
Start: 2024-10-08

## 2024-10-08 RX ORDER — NALTREXONE HYDROCHLORIDE 50 MG/1
50 TABLET, FILM COATED ORAL DAILY
Qty: 30 TABLET | Refills: 2 | Status: SHIPPED | OUTPATIENT
Start: 2024-10-08

## 2024-10-08 ASSESSMENT — COLUMBIA-SUICIDE SEVERITY RATING SCALE - C-SSRS
2. HAVE YOU ACTUALLY HAD ANY THOUGHTS OF KILLING YOURSELF?: NO
TOTAL  NUMBER OF INTERRUPTED ATTEMPTS LIFETIME: NO
TOTAL  NUMBER OF ABORTED OR SELF INTERRUPTED ATTEMPTS LIFETIME: NO
6. HAVE YOU EVER DONE ANYTHING, STARTED TO DO ANYTHING, OR PREPARED TO DO ANYTHING TO END YOUR LIFE?: NO
ATTEMPT LIFETIME: NO
1. HAVE YOU WISHED YOU WERE DEAD OR WISHED YOU COULD GO TO SLEEP AND NOT WAKE UP?: NO

## 2024-10-08 ASSESSMENT — PATIENT HEALTH QUESTIONNAIRE - PHQ9
10. IF YOU CHECKED OFF ANY PROBLEMS, HOW DIFFICULT HAVE THESE PROBLEMS MADE IT FOR YOU TO DO YOUR WORK, TAKE CARE OF THINGS AT HOME, OR GET ALONG WITH OTHER PEOPLE: NOT DIFFICULT AT ALL
SUM OF ALL RESPONSES TO PHQ QUESTIONS 1-9: 0
10. IF YOU CHECKED OFF ANY PROBLEMS, HOW DIFFICULT HAVE THESE PROBLEMS MADE IT FOR YOU TO DO YOUR WORK, TAKE CARE OF THINGS AT HOME, OR GET ALONG WITH OTHER PEOPLE: NOT DIFFICULT AT ALL
SUM OF ALL RESPONSES TO PHQ QUESTIONS 1-9: 0

## 2024-10-08 NOTE — NURSING NOTE
Current patient location: [address]   [facility] [id number]    Is the patient currently in the state of [place]? YES    Visit mode:VIDEO    If the visit is dropped, the patient can be reconnected by: VIDEO VISIT: Text to cell phone:   Telephone Information:   Mobile [phone]       Will anyone else be joining the visit? NO  (If patient encounters technical issues they should call [phone])    Are changes needed to the allergy or medication list? Pt stated no changes to allergies and Pt stated no med changes    Are refills needed on medications prescribed by this physician? Discuss with provider    Rooming Documentation:  Questionnaire(s) completed    Reason for visit: Consult    Azul SUAZO

## 2024-10-08 NOTE — PATIENT INSTRUCTIONS
"Patient Education   Collaborative Care Psychiatry Service  What to Expect  Here's what to expect from your Collaborative Care Psychiatry Service (CCPS).   About CCPS  CCPS means 2 people work together to help you get better. You'll meet with a behavioral health clinician and a psychiatric doctor. A behavioral health clinician helps people with mental health problems by talking with them. A psychiatric doctor helps people by giving them medicine.  How it works  At every visit, you'll see the behavioral health clinician (BHC) first. They'll talk with you about how you're doing and teach you how to feel better.   Then you'll see the psychiatric doctor. This doctor can help you deal with troubling thoughts and feelings by giving you medicine. They'll make sure you know the plan for your care.   CCPS usually takes 3 to 6 visits. If you need more visits, we may have you start seeing a different psychiatric doctor for ongoing care.  If you have any questions or concerns, we'll be glad to talk with you.  About visits  Be open  At your visits, please talk openly about your problems. It may feel hard, but it's the best way for us to help you.  Cancelling visits  If you can't come to your visit, please call us right away at [phone]. If you don't cancel at least 24 hours (1 full day) before your visit, that's \"late cancellation.\"  Being late to visits  Being very late is the same as not showing up. You will be a \"no show\" if:  Your appointment starts with a BHC, and you're more than 15 minutes late for a 30-minute (half hour) visit. This will also cancel your appointment with the psychiatric doctor.  Your appointment is with a psychiatric doctor only, and you're more than 15 minutes late for a 30-minute (half hour) visit.  Your appointment is with a psychiatric doctor only, and you're more than 30 minutes late for a 60-minute (full hour) visit.  If you cancel late or don't show up 2 times within 6 months, we may end your " care.   Getting help between visits  If you need help between visits, you can call us Monday to Friday from 8 a.m. to 4:30 p.m. at [phone].  Emergency care  Call 911 or go to the nearest emergency department if your life or someone else's life is in danger.  Call 988 anytime to reach the national Suicide and Crisis hotline.  Medicine refills  To refill your medicine, call your pharmacy. You can also call [facility]'s Behavioral Access at [phone], Monday to Friday, 8 a.m. to 4:30 p.m. It can take 1 to 3 business days to get a refill.   Forms, letters, and tests  You may have papers to fill out, like FMLA, short-term disability, and workability. We can help you with these forms at your visits, but you must have an appointment. You may need more than 1 visit for this, to be in an intensive therapy program, or both.  Before we can give you medicine for ADHD, we may refer you to get tested for it or confirm it another way.  We may not be able to give you an emotional support animal letter.  We don't do mental health checks ordered by the court.   We don't do mental health testing, but we can refer you to get tested.   Thank you for choosing us for your care.  For informational purposes only. Not to replace the advice of your health care provider. Copyright   2022 [facility]. All rights reserved. [place] [id number] - 12/22.

## 2024-10-08 NOTE — PROGRESS NOTES
[facility]         PATIENT'S NAME: Patrick Gutierrez  PREFERRED NAME: Patrick  PRONOUNS:       MRN: [id number]  : 1995  ADDRESS: [address]  [facility] [address]  [facility] NUM[facility]:  [id number]  DATE OF SERVICE: 10/08/24  START TIME: 733am  END TIME: 755am  PREFERRED PHONE: [phone]  May we leave a program related message: Yes  EMERGENCY CONTACT: was obtained father.  SERVICE MODALITY:  Video Visit:      Provider verified identity through the following two step process.  Patient provided:  Patient  and Patient was verified at admission/transfer    Telemedicine Visit: The patient's condition can be safely assessed and treated via synchronous audio and visual telemedicine encounter.      Reason for Telemedicine Visit: Patient has requested telehealth visit    Originating Site (Patient Location): Patient's home    Distant Site (Provider Location): Provider Remote Setting- Home Office    Consent:  The patient/guardian has verbally consented to: the potential risks and benefits of telemedicine (video visit) versus in person care; bill my insurance or make self-payment for services provided; and responsibility for payment of non-covered services.     Patient would like the video invitation sent by:  My Chart    Mode of Communication:  Video Conference via Amwell    Distant Location (Provider):  Off-site    As the provider I attest to compliance with applicable laws and regulations related to telemedicine.    First appointment with patient in [facility]PS and was advised of the short-term, team based structure of the model including role of [facility] and provider. Patient indicated understanding of the model and agreed to proceed with services as described. Care team has reviewed attendance agreement with patient. Patient advised that two failed appointments within 6 months may lead to termination of current episode of care.        UNIVERSAL ADULT  "Mental Health DIAGNOSTIC ASSESSMENT    Identifying Information:  Patient is a 29 year old,   individual.  Patient was referred for an assessment by primary care provider.  Patient attended the session alone.    Chief Complaint:   The reason for seeking services at this time is: \"N/a\".  The problem(s) began unknown .    Patient has attempted to resolve these concerns in the past through PCP, medication, past, chemical dependency group .    Pt states that they went through divorce and alcohol. He hasn't drank alcohol. The divorce was 3 months ago. Patrick says he felt really low and they haven't recognized this low feeling before. This did get better.   ADHD- since 3rd grade, medication is managing sx    Hope to: he started on new medication to help with cravings     Social/Family History:  Patient reported they grew up in other [place], [place].They were raised by adopted parents .  Parents were always together.  Patient reported that their childhood was \"good\".  Pt is a middle child, a older sister and one younger brother. Patient described their current relationships with family of origin as \"we don't talk\".     The patient describes their cultural background as .  Cultural influences and impact on patient's life structure, values, norms, and healthcare: Yazdanism.  Contextual influences on patient's health include: Contextual Factors: Individual Factors pt is a recently   male, works full time, has a hx of alcohol use, dx with ADHD, Family Factors pt is adopted, is a middle child, has older sister and a younger brother, Learning Environment Factors trouble with focus in school, dx with ADHD in the 3rd grade, and Economic Factors works fulltime .    These factors will be addressed in the Preliminary Treatment plan. Patient identified their preferred language to be English. Patient reported they do not need the assistance of an  or other support involved in therapy. " "    Patient reported had no significant delays in developmental tasks.  Patient's highest education level was college graduate .  Patient identified the following learning problems: attention and concentration.  Grades were B's and C's. Modifications will not be used to assist communication in therapy.  Patient reports they are  able to understand written materials.    Patient reported the following relationship history  1x for 2 years.  Patient's current relationship status is single for 3 months.   Patient identified their sexual orientation as other, straight, male.  Patient reported having 0  child(kailey). Patient identified friends as part of their support system.  Patient identified the quality of these relationships as other.    Patient's current living/housing situation involves staying in own home/apartment.  The immediate members of family and household include 0, and they report that housing is stable.    Patient is currently employed fulltime.  Pt is able to function at work. Patient reports their finances are obtained through employment. Patient does not identify finances as a current stressor.      Patient reported that they have been involved with the legal system.  2 DWIS. From 7/24/2024 Melia Hernandez NP note from [facility] these were in 2021. Pt's DEC assessment from 9/23/2024 ED visit notes that \"In July 2024, at the time of his divorce, patient killed his wife's dog while intoxicated. He has legal procedures in process for that.\" Patient does not report being under probation/ parole/ jurisdiction. They are not under any current court jurisdiction.     Patient's Strengths and Limitations:  Patient identified the following strengths or resources that will help them succeed in treatment: Caodaism / Orthodox, trey / spirituality, friends / good social support, motivation, and sense of humor. Things that may interfere with the patient's success in treatment include:  return to alcohol use at " times, feeling depressed .     Assessments:  The following assessments were completed by patient for this visit:  PHQ9:       4/25/2022     4:58 PM 3/7/2023     6:46 AM 8/28/2024     4:47 PM 8/28/2024     4:52 PM 10/8/2024     6:41 AM   PHQ-9 SCORE   PHQ-9 Total Score MyChart  0  0 0   PHQ-9 Total Score 9 0 0 0 0    0     GAD2:       10/8/2024     6:52 AM   DIANE-2   Feeling nervous, anxious, or on edge 0   Not being able to stop or control worrying 0   DIANE-2 Total Score 0    0     GAD7:       10/24/2022     7:36 AM 3/7/2023     6:47 AM 8/28/2024     4:47 PM 8/28/2024     4:52 PM   DIANE-7 SCORE   Total Score 2 (minimal anxiety) 1 (minimal anxiety)  0 (minimal anxiety)   Total Score 2 1 0 0     CAGE-AID:       10/8/2024     6:53 AM   CAGE-AID Total Score   Total Score 3    3   Total Score MyChart 3 (A total score of 2 or greater is considered clinically significant)     PROMIS 10-Global Health (only subscores and total score):       10/8/2024     6:52 AM   PROMIS-10 Scores Only   Global Mental Health Score 9    9   Global Physical Health Score 15    15   PROMIS TOTAL - SUBSCORES 24    24     [place] Suicide Severity Rating Scale (Lifetime/Recent)      9/23/2024     1:59 AM 9/23/2024     4:12 AM 10/8/2024     8:44 AM   [place] Suicide Severity Rating (Lifetime/Recent)   Q1 Wish to be Dead (Lifetime)  No    Q2 Non-Specific Active Suicidal Thoughts (Lifetime)  No    Q1 Wished to be Dead (Past Month) 0-->no     Q2 Suicidal Thoughts (Past Month) 0-->no     Q6 Suicide Behavior (Lifetime) 0-->no 0-->no    Level of Risk per Screen no risks indicated     Q1 Wish to be Dead (Lifetime)   N   Q2 Non-Specific Active Suicidal Thoughts (Lifetime)   N   Actual Attempt (Lifetime)  N N   Has subject engaged in non-suicidal self-injurious behavior? (Lifetime)  Y Y   Has subject engaged in non-suicidal self-injurious behavior? (Past 3 Months)   N   Interrupted Attempts (Lifetime)  N N   Aborted or Self-Interrupted Attempt (Lifetime)  N N    Preparatory Acts or Behavior (Lifetime)  N N   Calculated C-SSRS Risk Score (Lifetime/Recent)  No Risk Indicated No Risk Indicated     Personal and Family Medical History:  Patient does not report a family history of mental health concerns.  Patient reports family history includes Unknown/Adopted in his father, maternal grandfather, maternal grandmother, mother, paternal grandfather, and paternal grandmother.     Patient does report Mental Health Diagnosis and/or Treatment.  Patient reported the following previous diagnoses which include(s):   ADHD and depression.  Patient reported symptoms began unknown.  Patient has not received mental health services in the past:  none.  From chart review, 7/24 note from Melia Lancaster NP states pt did IOP and was referred to a Melia Hernandez NP provider from [facility] Psychiatric Hospitalizations:  none .  From chart review, DEC assessment 9/23/24 pt is scheduled from a neuropsychological evaluation on 12/4/2024.  Patient denies a history of civil commitment.      Currently, patient    is not receiving other mental health services.  These include none.   Not currently. He just started a [employer].     Patient has had a physical exam to rule out medical causes for current symptoms.  Date of last physical exam was within the past year. Client was encouraged to follow up with PCP if symptoms were to develop. The patient has a [facility] Provider, who is named Katt Meredith. Patient reports the following current medical concerns: see pt's chart and Dr. Naik's note from [facility] team based visit.  Patient denies any issues with pain..  There are significant appetite / nutritional concerns / weight changes.  Patient does report a history of head injury / trauma / cognitive impairment.  4 concussions in sports, LOC in 2    Patient reports current meds as:   Current Outpatient Medications   Medication Sig Dispense Refill    ADDERALL  XR 30 MG 24 hr capsule TAKE ONE CAPSULE BY MOUTH ONCE DAILY . EARLIEST FILL DATE 11/1/23 30 capsule 0    albuterol (VENTOLIN HFA) 108 (90 Base) MCG/ACT inhaler INHALE 2 PUFFS INTO THE LUNGS EVERY 6 HOURS AS NEEDED FOR SHORTNESS OF BREATH, DIFFICULTY BREATHING OR WHEEZING. 18 g 3    amphetamine-dextroamphetamine (ADDERALL XR) 30 MG 24 hr capsule Take 1 capsule (30 mg) by mouth daily. 30 capsule 0    amphetamine-dextroamphetamine (ADDERALL XR) 30 MG 24 hr capsule Take 1 capsule (30 mg) by mouth daily. 30 capsule 0    amphetamine-dextroamphetamine (ADDERALL XR) 30 MG 24 hr capsule TAKE ONE CAPSULE BY MOUTH ONCE DAILY 7 capsule 0    amphetamine-dextroamphetamine (ADDERALL XR) 30 MG 24 hr capsule Take 1 capsule (30 mg) by mouth daily 30 capsule 0    amphetamine-dextroamphetamine (ADDERALL XR) 30 MG 24 hr capsule Take 1 capsule (30 mg) by mouth daily 30 capsule 0    amphetamine-dextroamphetamine (ADDERALL XR) 30 MG 24 hr capsule Take 1 capsule (30 mg) by mouth daily 30 capsule 0    amphetamine-dextroamphetamine (ADDERALL XR) 30 MG 24 hr capsule TAKE 1 CAPSULE (30 MG) BY MOUTH DAILY. EARLIST FILL DATE 4/28 30 capsule 0    escitalopram (LEXAPRO) 10 MG tablet Take 1 tablet (10 mg) by mouth daily. 30 tablet 0    fluticasone (FLOVENT HFA) 220 MCG/ACT inhaler Inhale 1 puff into the lungs 2 times daily. 12 g 3    hydrOXYzine (VISTARIL) 25 MG capsule Take 1 capsule (25 mg) by mouth 3 times daily as needed for anxiety 30 capsule 0    Melatonin 10 MG TABS tablet Take 10 mg by mouth nightly as needed for sleep      naltrexone (DEPADE/REVIA) 50 MG tablet Take 1 tablet (50 mg) by mouth daily. 30 tablet 0     No current facility-administered medications for this visit.       Medication Adherence:  Patient reports taking.      Patient Allergies:    Allergies   Allergen Reactions    Amoxicillin Hives and Itching       Medical History:    Past Medical History:   Diagnosis Date    Kidney stone 2018    Left         Current Mental Status  Exam:   Appearance:  Appropriate    Eye Contact:  Good   Psychomotor:  Normal       Gait / station:  no problem  Attitude / Demeanor: Cooperative  Pleasant Guarded   Speech      Rate / Production: Normal/ Responsive      Volume:  Normal  volume      Language:  intact  Mood:   Depressed  Grieving  Affect:   Appropriate    Thought Content: Clear   Thought Process: Coherent  Logical       Associations: No loosening of associations  Insight:   Fair   Judgment:  Intact   Orientation:  All  Attention/concentration: Good    Substance Use:   Patient did report a family history of substance use concerns; see medical history section for details. Not in adopted family, in biological family.  Patient has not received chemical dependency treatment in the past.  Patient has not ever been to detox.      Patient is not currently receiving any chemical dependency treatment. Pt went to a group in [place] for a few months 1.5 -2 years ago.           Substance History of use Age of first use Date of last use     Pattern and duration of use (include amounts and frequency)   Alcohol currently use   18 10/06/24 On and off periodically- pt doesn't like it, no urges or cravings, 2 years was constant   Cannabis   never used     REPORTS SUBSTANCE USE: N/A     Amphetamines   currently use   10/08/24 Currently prescribed   Cocaine/crack    never used       REPORTS SUBSTANCE USE: N/A   Hallucinogens never used         REPORTS SUBSTANCE USE: N/A   Inhalants never used         REPORTS SUBSTANCE USE: N/A   Heroin never used         REPORTS SUBSTANCE USE: N/A   Other Opiates never used     REPORTS SUBSTANCE USE: N/A   Benzodiazepine   never used     REPORTS SUBSTANCE USE: N/A   Barbiturates never used     REPORTS SUBSTANCE USE: N/A   Over the counter meds used in the past Highschool (16years old) 06/06/17 REPORTS SUBSTANCE USE: N/A   Caffeine currently use Since a kid   Lot of soda, no heart racing   Nicotine  used in the past 24 04/12/20 REPORTS  SUBSTANCE USE: N/A   Other substances not listed above:  Identify:  never used     REPORTS SUBSTANCE USE: N/A     Patient reported the following problems as a result of their substance use: DUI; legal issues; relationship problems.    Substance Use:  cutting his head when feeling depressed to feel    Based on the positive CAGE score and clinical interview there  are not indications of drug or alcohol abuse. Provider will continue to monitor.     Significant Losses / Trauma / Abuse / Neglect Issues:   Patient did not serve in the .  There are indications or report of significant loss, trauma, abuse or neglect issues related to: divorce / relational changes pt is  for 3 months .  Concerns for possible neglect are not present.     Safety Assessment:   Patient denies current homicidal ideation and behaviors.  Patient denies any SIB when asked. 9/23/2024 ED visit notes pt was seen for a laceration on his head from a superficial cut with razor from drinking alcohol and wanting to feel something.  Patient denied risk behaviors associated with substance use.   Patient reported impulsive decisionmaking associated with mental health symptoms.  Patient reports the following current concerns for their personal safety: None.  Patient reports there are not firearms in the house.           History of Safety Concerns:  Patient denied a history of homicidal ideation.     Patient reported a history of personal safety concerns: cutting his head with razor and was seen in ED 9/23/2024  Patient denied a history of assaultive behaviors.    Patient denied a history of sexual assault behaviors.     Patient reported a history unsafe motor vehicle operation associated with substance use.  Patient reported a history of substance use associated with mental health symptoms.  Patient reports the following protective factors: purpose    Risk Plan:  See Recommendations for Safety and Risk Management Plan    Review of Symptoms per  patient report:   Depression: Lack of interest, Feeling sad, down, or depressed, and Withdrawn, feeling stuck, SIB- no in lifetime, hx of this from chart, SI- no in lifetime  Patricia:  No Symptoms  Psychosis: No Symptoms  Anxiety: Poor concentration  Panic:  No symptoms  Post Traumatic Stress Disorder:  No Symptoms, pt denies when asked, from chart review 9/23/2024 DEC assessment with ED visit, pt reported a hx of SA by a cousin whom he forgave  Eating Disorder: No Symptoms  ADD / ADHD:  Inattentive  Conduct Disorder: No symptoms  Autism Spectrum Disorder: No symptoms  Obsessive Compulsive Disorder: No Symptoms    Patient reports the following compulsive behaviors and treatment history:  none .      Diagnostic Criteria:   Adjustment Disorder  A. The development of emotional or behavioral symptoms in response to an identifiable stressor(s) occurring within 3 months of the onset of the stressor(s)  B. These symptoms or behaviors are clinically significant, as evidenced by one or both of the following:       - Marked distress that is out of proportion to the severity/intensity of the stressor (with consideration for external context & culture)       - Significant impairment in social, occupational, or other important areas of functioning  C. The stress-related disturbance does not meet criteria for another disorder & is not not an exacerbation of another mental disorder  D. The symptoms do not represent normal bereavement  E. Once the stressor or its consequences have terminated, the symptoms do not persist for more than an additional 6 months       * Adjustment Disorder with Depressed Mood: The predominant manifestations are symptoms such as low mood, tearfulness, or feelings of hopelessness    Functional Status:  Patient reports the following functional impairments:  chronic disease management, relationship(s), and self-care.     Nonprogrammatic care:  Patient is requesting basic services to address current mental  health concerns.    Clinical Summary:  1. Psychosocial, Cultural and Contextual Factors: pt experienced divorce 3 months ago, is male, works fulltime, dx with ADHD, on and off alcohol abuse, is Anabaptist and attends a [employer].  2. Principal DSM5 Diagnoses  (Sustained by DSM5 Criteria Listed Above):   Adjustment Disorders  309.0 (F43.21) With depressed mood.  3. Other Diagnoses that is relevant to services:   alcohol abuse, ADHD, 4 TBI, 2 with LOC.  4. Provisional Diagnosis:  Adjustment Disorders  309.0 (F43.21) With depressed mood as evidenced by PHQ9, GAD7 and clinical interview  5. Prognosis: Expect improvement.  6. Likely consequences of symptoms if not treated: worsening MH.  7. Client strengths include:  committed to sobriety, educated, employed, motivated, support of family, friends and providers, and work history .     Recommendations:     1. Plan for Safety and Risk Management:   Safety and Risk: A safety and risk management plan has been developed including: Patient consented to co-developed safety plan on 9/23/2024.  Safety and risk management plan was reviewed.   Patient agreed to use safety plan should any safety concerns arise.  A copy was made available to the patient..          Report to child / adult protection services was NA.     2. Patient's identified mental health concerns with a cultural influence will be addressed by [facility] Staff .     3. Initial Treatment will focus on:    Adjustment Difficulties related to: loss of signigicant relationship  Alcohol / Substance Use - alcohol abuse and sobriety .     4. Resources/Service Plan:    services are not indicated.   Modifications to assist communication are not indicated.   Additional disability accommodations are not indicated.      5. Collaboration:   Collaboration / coordination of treatment will be initiated with the following  support professionals: outpatient therapist.      6.  Referrals:   The following referral(s) will  be initiated: Outpatient [facility].       A Release of Information has been obtained for the following:  N/A .     Clinical Substantiation/medical necessity for the above recommendations:  Pt has a hx of depression, anxiety and ADHD symptoms that are impacting daily functioning in daily living and social settings. Through receiving support through [facility] model for medication and [facility] checking on use of coping skills and therapy to help combat these symptoms may provide Pt with relief. Pt reports that they are struggling to manage depressive and ADHD symptoms and again CCPS model and therapy can assist with providing coping skills, following up that pt is using these skills, safety plan or other interventions along with medication to have the best impact to manage symptoms and provide relief. At this time pt's symptoms are able to be managed with OP services and pt will be referred to a higher level of care if there are abrupt changes in presentation or risk of harm.    7. WILNER:    WILNER:  Discussed the general effects of drugs and alcohol on health and well-being. Provider gave patient printed information about the  effects of chemical use on their health and well being. Recommendations:   continue sobriety and take medication to suppress cravings and urges.     8. Records:   These were reviewed at time of assessment.   Information in this assessment was obtained from the medical record and  provided by patient who is a good historian.    Patient will have open access to their mental health medical record.    9.   Interactive Complexity: No    10. Safety Plan:   E[place] Safety Plan      Creation Date: 9/23/24       Step 1: Warning signs:    Warning Signs    Patrick denies suicidal thoughts.  Engaged in non-suicidal self-harm when intoxicated.    Drinking alcohol    Feeling down or sad    Thoughts of self-harm      Step 2: Internal coping strategies - Things I can do to take my mind off my problems without  contacting another person:    Strategies    Go for a walk    Practice relaxation breathing    Read my Bible    Hansen      Step 3: People and social settings that provide distraction:    Name Contact Information    Attend an AA meeting     Go to Rastafarian     Invite a Rastafarian friend for coffee        Places    Go swimming      Step 4: People whom I can ask for help during a crisis:   No contacts identified     Step 5: Professionals or agencies I can contact during a crisis:    Clinician/Agency Name Phone Emergency Contact    COPE crisis response team [phone]     Text [place] to 162513 A crisis worker will reply by text     Talk to an AA sponsor If you don't have a sponsor, contact your local AA and ask for one.       Suicide Prevention Lifeline Phone: Call or Text 988  Crisis Text Line: Text HOME to 419303     Step 6: Making the environment safer (plan for lethal means safety):   Remove sharp objects from the area.    Abstain from alcohol.      Optional: What is most important to me and worth living for?:   Natalee Wilks-Dusty Safety Plan. Lyly Wilks and Lucas Montano. Used with permission of the authors.           Provider Name/ Credentials:  LAURA Avalos, LICSW [facility]  October 8, 2024
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
## 2024-10-08 NOTE — Clinical Note
Dr. Meredith,  Thank you for your consult and care of the patient.  Currently patient is stable.  He has agreed to therapy.  I have maintained current doses of the medications.  I think due to adverse childhood events he has may be a predisposition to a degree of mental health and addiction challenges.  He is future and goal directed and remorseful for the incident in late September. Symptoms are currently manageable.  Sincerely, Timbo Naik M.D. Consultative Psychiatrist Program Medical Director, [facility]

## 2024-10-10 ENCOUNTER — TELEPHONE (OUTPATIENT)
Dept: FAMILY MEDICINE | Facility: CLINIC | Age: 29
End: 2024-10-10
Payer: COMMERCIAL

## 2024-10-10 NOTE — TELEPHONE ENCOUNTER
"Routing to PCP    RN called patient. He has visit scheduled for 11/13/24. He doesn't think he can come in sooner than this as he has lots of bills to pay and appts get expensive. He will go to pharmacy to recheck BP again.    \"Patient had elevated bp's,he can check one more time with pharmacy to recheck if elevated   Please get him in with me to check and discuss options of stimulants   Katt Meredith D.O.\" Provider sent to RN pool on 10/8/2024 at 3:20 PM    Celeste Hearn RN    "

## 2024-10-24 NOTE — TELEPHONE ENCOUNTER
Routing to PCP as DEVYN    Looks like patient cancelled his visit on 11/13    RN tried to call patient, but was unable to reach him. Unable to leave VM    Does not look like he has seen pharmacy to recheck BP    Celeste Hearn RN

## 2024-10-30 ENCOUNTER — TELEPHONE (OUTPATIENT)
Dept: FAMILY MEDICINE | Facility: CLINIC | Age: 29
End: 2024-10-30

## 2024-10-30 NOTE — TELEPHONE ENCOUNTER
Called patient and had to leave a detailed voicemail message. Relayed Dr Meredith message below.    VERONIKA Major  [facility]

## 2024-10-30 NOTE — TELEPHONE ENCOUNTER
Katt Meredith DO [facility] Pool  His bp was high, he should be seen with provider , If he is not wanting to , maybe buy bp machine and have him track it and if it is consistently above 120/80  Katt Meredith D.O.      Copied from CC chart.    VERONIKA Major  [facility]

## 2024-10-31 NOTE — TELEPHONE ENCOUNTER
Called patient and relayed Dr Meredith message below in a voicemail message.    VERONIKA Major  [facility]

## 2024-10-31 NOTE — TELEPHONE ENCOUNTER
Routing to Dr Meredith.     Patient declined scheduling an appointment w/ Dr Meredith. Patient said he would check in periodically at the pharmacy.    VERONIKA Major  [facility]

## 2024-10-31 NOTE — TELEPHONE ENCOUNTER
Katt Meredith DO [facility] Pool  Bp high at the pharmacy  Advised follow up with provider  If declining, should be rechecking with shellie Meredith D.O.    Copied from CC chart.    VERONIKA Major  [facility]

## 2024-11-04 DIAGNOSIS — F90.0 ATTENTION DEFICIT HYPERACTIVITY DISORDER (ADHD), PREDOMINANTLY INATTENTIVE TYPE: ICD-10-CM

## 2024-11-04 RX ORDER — DEXTROAMPHETAMINE SACCHARATE, AMPHETAMINE ASPARTATE MONOHYDRATE, DEXTROAMPHETAMINE SULFATE AND AMPHETAMINE SULFATE 7.5; 7.5; 7.5; 7.5 MG/1; MG/1; MG/1; MG/1
30 CAPSULE, EXTENDED RELEASE ORAL DAILY
Qty: 30 CAPSULE | Refills: 0 | Status: SHIPPED | OUTPATIENT
Start: 2024-11-04

## 2024-11-12 ENCOUNTER — PATIENT OUTREACH (OUTPATIENT)
Dept: CARE COORDINATION | Facility: CLINIC | Age: 29
End: 2024-11-12
Payer: COMMERCIAL

## 2024-11-26 ENCOUNTER — PATIENT OUTREACH (OUTPATIENT)
Dept: CARE COORDINATION | Facility: CLINIC | Age: 29
End: 2024-11-26
Payer: COMMERCIAL

## 2024-12-17 DIAGNOSIS — F90.0 ATTENTION DEFICIT HYPERACTIVITY DISORDER (ADHD), PREDOMINANTLY INATTENTIVE TYPE: ICD-10-CM

## 2024-12-17 RX ORDER — DEXTROAMPHETAMINE SACCHARATE, AMPHETAMINE ASPARTATE MONOHYDRATE, DEXTROAMPHETAMINE SULFATE AND AMPHETAMINE SULFATE 7.5; 7.5; 7.5; 7.5 MG/1; MG/1; MG/1; MG/1
30 CAPSULE, EXTENDED RELEASE ORAL DAILY
Qty: 30 CAPSULE | Refills: 0 | Status: SHIPPED | OUTPATIENT
Start: 2024-12-17

## 2024-12-17 NOTE — TELEPHONE ENCOUNTER
Needs blood pressure to be monitored and a vist  Refilled for now but should come in with any provider to follow up in one [facility]   Katt Meredith D.O.

## 2024-12-24 NOTE — TELEPHONE ENCOUNTER
Called patient and left a voicemail message to call the clinic and schedule an Annual physical/ blood pressure follow up/ med check appointment.      Dana Victor

## 2024-12-28 ENCOUNTER — HOSPITAL ENCOUNTER (EMERGENCY)
Facility: CLINIC | Age: 29
Discharge: HOME | End: 2024-12-28
Attending: EMERGENCY MEDICINE | Admitting: EMERGENCY MEDICINE
Payer: COMMERCIAL

## 2024-12-28 VITALS
WEIGHT: 180 LBS | BODY MASS INDEX: 25.77 KG/M2 | RESPIRATION RATE: 18 BRPM | OXYGEN SATURATION: 96 % | HEART RATE: 99 BPM | SYSTOLIC BLOOD PRESSURE: 125 MMHG | TEMPERATURE: 98.5 F | DIASTOLIC BLOOD PRESSURE: 76 MMHG | HEIGHT: 70 IN

## 2024-12-28 DIAGNOSIS — F10.920 ALCOHOLIC INTOXICATION WITHOUT COMPLICATION (H): ICD-10-CM

## 2024-12-28 PROCEDURE — 99283 EMERGENCY DEPT VISIT LOW MDM: CPT

## 2024-12-28 ASSESSMENT — ACTIVITIES OF DAILY LIVING (ADL)
ADLS_ACUITY_SCORE: 41
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
## 2024-12-28 NOTE — TELEPHONE ENCOUNTER
Top part only for RN not for patient.   (Patient has had a recent hospital visit for continual alcohol use, he had been asked to  be seen here for elevated BP and has not done so.    I am concerned about his adhd med to be causing problems with his current condition.)    He has been seeing psych as well he was seen 10/24 and was supposed to follow up in 2 months.   Please ask him to reach out to psych for January refill now, I gave him his dec refill and I wanted him to be aware ahead of time to reach out to psych  Katt Meredith D.O.

## 2024-12-28 NOTE — ED NOTES
Writer notes scars to right eye.  Pt endorses self-harm of right eye due to divorce four months ago.
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
## 2024-12-30 ENCOUNTER — PATIENT OUTREACH (OUTPATIENT)
Dept: FAMILY MEDICINE | Facility: CLINIC | Age: 29
End: 2024-12-30
Payer: COMMERCIAL

## 2024-12-30 NOTE — TELEPHONE ENCOUNTER
Attempt #1 to call patient.     RN left voicemail and requested return call to [facility] at [phone].     Celeste Hearn RN, BSN  [facility]: [place]

## 2024-12-30 NOTE — TELEPHONE ENCOUNTER
Attempt #1 to call patient.     RN left voicemail and requested return call to [facility] [[email or web address] at [phone].     Celeste Hearn RN, BSN  [facility]: [place]

## 2024-12-30 NOTE — TELEPHONE ENCOUNTER
Patient seen in ER 12/28/24.    Diagnosis. Alcoholic intoxication without complication (     Recommendation.      Schedule an appointment with Katt Meredith DO (Family Medicine)     Christine M Klisch, RN

## 2024-12-30 NOTE — LETTER
January 6, 2025      Patrick Gutierrez  [address]   [place] [address]        Dear Patrick,         We have tried to attempted to reach you.   Please call the clinic at [phone] and ask to speak with a care team.                        Sincerely,  [facility]

## 2024-12-31 ENCOUNTER — ALLIED HEALTH/NURSE VISIT (OUTPATIENT)
Dept: FAMILY MEDICINE | Facility: CLINIC | Age: 29
End: 2024-12-31
Payer: COMMERCIAL

## 2024-12-31 VITALS — SYSTOLIC BLOOD PRESSURE: 144 MMHG | DIASTOLIC BLOOD PRESSURE: 91 MMHG

## 2024-12-31 DIAGNOSIS — I15.9 SECONDARY HYPERTENSION: Primary | ICD-10-CM

## 2024-12-31 PROCEDURE — 99207 PR NO CHARGE NURSE ONLY: CPT | Performed by: FAMILY MEDICINE

## 2024-12-31 NOTE — PROGRESS NOTES
Patrick Gutierrez was evaluated at [facility] on December 31, 2024 at which time his blood pressure was:    BP Readings from Last 1 Encounters:   12/31/24 (!) 144/91     No data recorded      Reviewed lifestyle modifications for blood pressure control and reduction: including making healthy food choices, managing weight, getting regular exercise, smoking cessation, reducing alcohol consumption, monitoring blood pressure regularly.     Symptoms: None    BP Goal:< 140/90 mmHg    BP Assessment:  BP too high    Potential Reasons for BP too high: Physical activity within past 30 minutes    BP Follow-Up Plan: Referral to PCP    Recommendation to Provider:     Note completed by: Andrzej Chino RP

## 2025-01-02 ENCOUNTER — TELEPHONE (OUTPATIENT)
Dept: FAMILY MEDICINE | Facility: CLINIC | Age: 30
End: 2025-01-02

## 2025-01-02 NOTE — TELEPHONE ENCOUNTER
Closing encounter.    See H/U encounter      Artemio Stevenson, RN, BSN, PHN  [facility]

## 2025-01-02 NOTE — PROGRESS NOTES
Needs to be seen as previously advised with primary care provider  I will no longer prescribe his adderal as he is seeing psych and is now having bp issues, it would not be safe  Please see previous encounters  Katt Meredith D.O.

## 2025-01-02 NOTE — TELEPHONE ENCOUNTER
Attempt #2 to reach patient.    RN left  requesting call back to clinic.    Also see refill request for adderall    Top part only for RN not for patient.   (Patient has had a recent hospital visit for continual alcohol use, he had been asked to  be seen here for elevated BP and has not done so.    I am concerned about his adhd med to be causing problems with his current condition.)     He has been seeing psych as well he was seen 10/24 and was supposed to follow up in 2 months.   Please ask him to reach out to psych for January refill now, I gave him his dec refill and I wanted him to be aware ahead of time to reach out to psych  Katt Mereidth D.O.       Artemio Stevenson, RN, BSN, PHN  [facility]

## 2025-01-02 NOTE — TELEPHONE ENCOUNTER
Routing to PCP.  DEVYN.    Called patient to schedule an appointment due to high blood pressure.  Patient said that he is declining to schedule an appointment due to many medical bills he has and he cannot afford to come in.    VERONIKA Major  [facility]

## 2025-01-06 NOTE — TELEPHONE ENCOUNTER
Routing to TEAM    Please send the patient a letter to listed address stating:    We have tried to call you multiple times regarding a message from your provider, but we have been unable to reach you.    Please call us back at the clinic at [phone] and ask to speak with a nurse. Thank you.    Celeste Hearn RN

## 2025-01-12 ENCOUNTER — HEALTH MAINTENANCE LETTER (OUTPATIENT)
Age: 30
End: 2025-01-12

## 2025-01-24 DIAGNOSIS — F90.0 ATTENTION DEFICIT HYPERACTIVITY DISORDER (ADHD), PREDOMINANTLY INATTENTIVE TYPE: ICD-10-CM

## 2025-01-24 DIAGNOSIS — J45.990 EXERCISE-INDUCED ASTHMA: ICD-10-CM

## 2025-01-27 RX ORDER — ALBUTEROL SULFATE 90 UG/1
INHALANT RESPIRATORY (INHALATION)
Qty: 18 G | Refills: 3 | Status: SHIPPED | OUTPATIENT
Start: 2025-01-27

## 2025-01-27 RX ORDER — DEXTROAMPHETAMINE SACCHARATE, AMPHETAMINE ASPARTATE MONOHYDRATE, DEXTROAMPHETAMINE SULFATE AND AMPHETAMINE SULFATE 7.5; 7.5; 7.5; 7.5 MG/1; MG/1; MG/1; MG/1
30 CAPSULE, EXTENDED RELEASE ORAL DAILY
Qty: 30 CAPSULE | Refills: 0 | OUTPATIENT
Start: 2025-01-27

## 2025-04-21 NOTE — TELEPHONE ENCOUNTER
He has been in ED, needs psych monitoring  Thanks  Katt Meredith D.O.    
Patient had a visit with PCP on 10/2    He got his medications refilled at this visit    Closing encounter    Celeste Hearn RN    
Patient is completely out of his medication and is needing it refilled ASAP.  Patient would like call with an update.    VERONIKA Major  [facility]    
RN left  for patient requesting call back to clinic    RN called to advised prescription had been sent in.    Artemio Stevenson RN, BSN, PHN  [facility]  
Routing to [place] providers      Willing to send in short script until appointment with Dr. Meredith 10/2?    Medication being prescribed by primary care.    RN completed hospital follow up 9/23    Patient had behavioral health outreach 9/24.  RN did not break the glass.    Artemio Stevenson, RN, BSN, PHN  [facility]  
Will send in one week worth of rx until follow up with PCP  PDMP reviewed   Please let pt know   Thanks  Cecilia Arvizu, DO    
Detail Level: Detailed